# Patient Record
Sex: FEMALE | Race: WHITE | Employment: OTHER | URBAN - METROPOLITAN AREA
[De-identification: names, ages, dates, MRNs, and addresses within clinical notes are randomized per-mention and may not be internally consistent; named-entity substitution may affect disease eponyms.]

---

## 2017-01-20 ENCOUNTER — TELEPHONE (OUTPATIENT)
Dept: HEMATOLOGY/ONCOLOGY | Facility: HOSPITAL | Age: 67
End: 2017-01-20

## 2017-01-20 NOTE — TELEPHONE ENCOUNTER
Phoned patient to inform her that she does not need to follow up with the cardiologist any further since these visits are not covered by her insurance. Patient was instructed to follow up with Dr. Peter Couch as indicated.  Patient instructed to call with any fu

## 2017-03-02 ENCOUNTER — TELEPHONE (OUTPATIENT)
Dept: SURGERY | Facility: CLINIC | Age: 67
End: 2017-03-02

## 2017-04-17 ENCOUNTER — TELEPHONE (OUTPATIENT)
Dept: HEMATOLOGY/ONCOLOGY | Facility: HOSPITAL | Age: 67
End: 2017-04-17

## 2017-04-17 NOTE — TELEPHONE ENCOUNTER
Pt currently taking Exemestane daily. Calling to report increase joint pain. Has a trip to [place] on wed 4/19-4/26. Questioning if she can hold medication x 1 week to see if symptoms improve. Pt has MD f/u appt on 5/1.   Discussed above with Dr. Cristal Orellana- ok

## 2017-05-01 ENCOUNTER — OFFICE VISIT (OUTPATIENT)
Dept: HEMATOLOGY/ONCOLOGY | Age: 67
End: 2017-05-01
Attending: INTERNAL MEDICINE
Payer: COMMERCIAL

## 2017-05-01 VITALS
TEMPERATURE: 98 F | HEART RATE: 89 BPM | SYSTOLIC BLOOD PRESSURE: 128 MMHG | OXYGEN SATURATION: 99 % | BODY MASS INDEX: 36 KG/M2 | RESPIRATION RATE: 20 BRPM | WEIGHT: 201 LBS | DIASTOLIC BLOOD PRESSURE: 82 MMHG

## 2017-05-01 DIAGNOSIS — R23.2 HOT FLASHES RELATED TO AROMATASE INHIBITOR THERAPY: ICD-10-CM

## 2017-05-01 DIAGNOSIS — T45.1X5A HOT FLASHES RELATED TO AROMATASE INHIBITOR THERAPY: ICD-10-CM

## 2017-05-01 DIAGNOSIS — E55.9 VITAMIN D DEFICIENCY: ICD-10-CM

## 2017-05-01 DIAGNOSIS — C50.111 MALIGNANT NEOPLASM OF CENTRAL PORTION OF RIGHT FEMALE BREAST (HCC): ICD-10-CM

## 2017-05-01 DIAGNOSIS — T50.905D MEDICATION ADVERSE EFFECT, SUBSEQUENT ENCOUNTER: Primary | ICD-10-CM

## 2017-05-01 PROCEDURE — 99214 OFFICE O/P EST MOD 30 MIN: CPT | Performed by: INTERNAL MEDICINE

## 2017-05-01 RX ORDER — ANASTROZOLE 1 MG/1
1 TABLET ORAL DAILY
COMMUNITY
End: 2017-05-18 | Stop reason: ALTCHOICE

## 2017-05-01 RX ORDER — LETROZOLE 2.5 MG/1
2.5 TABLET, FILM COATED ORAL DAILY
Qty: 30 TABLET | Refills: 11 | Status: SHIPPED | OUTPATIENT
Start: 2017-05-01 | End: 2018-04-21

## 2017-05-01 NOTE — PROGRESS NOTES
Pt here for MD f/u visit for h/o breast ca. Restarted Anastrozole 1mg daily on Thurs, 4/20. States she has several concerns to discuss with Dr. Jessika Nugent.      Education Record    Learner:  Patient    Disease / Diagnosis:breast ca    Barriers / Limitations:  N

## 2017-05-01 NOTE — PATIENT INSTRUCTIONS
For Dr. Sirisha Barnett nurse line, call  [phone] with any questions or concerns Monday through Friday 8:00 to 4:30.   After hours or weekends for emergent needs [phone]

## 2017-05-01 NOTE — PROGRESS NOTES
[facility] Progress Note      Patient Name:  Queen Amor  YOB: 1950  Medical Record Number:  [id number]    Date of visit: 5/1/2017    CHIEF COMPLAINT: Stage I HER-2 positive right breast carcinoma status post bilateral mastecto easy bruising or bleeding     ALLERGIES:    Adhesive Tape               Comment:Nola  Bacitracin                Codeine                 Nausea and vomiting  Doxycycline             Rash  Levaquin                Rash  Levaquin [Levofloxa* mg by mouth daily. Disp:  Rfl:    Vitamin C (VITAMIN C) 500 MG Oral Tab Take 500 mg by mouth 2 (two) times daily. Disp:  Rfl:    exemestane 25 MG Oral Tab Take 1 tablet (25 mg total) by mouth daily.  Disp: 30 tablet Rfl: 11       VITALS:  Height: --  [employer] 4. 45 1.30-6.70 x10 (3) uL   Neutrophil Absolute 4.45 1.30-6.70 x10(3) uL   Lymphocyte Absolute 1.57 0.90-4.00 x10(3) uL   Monocyte Absolute 0.43 0.10-0.60 x10(3) uL   Eosinophil Absolute 0.15 0.00-0.30 x10(3) uL   Basophil Absolute 0.03 0.00-0.10 x10(3) uL

## 2017-05-18 ENCOUNTER — OFFICE VISIT (OUTPATIENT)
Dept: INTERNAL MEDICINE CLINIC | Facility: CLINIC | Age: 67
End: 2017-05-18

## 2017-05-18 VITALS
HEIGHT: 62.75 IN | OXYGEN SATURATION: 99 % | WEIGHT: 201 LBS | SYSTOLIC BLOOD PRESSURE: 110 MMHG | TEMPERATURE: 98 F | RESPIRATION RATE: 16 BRPM | BODY MASS INDEX: 36.06 KG/M2 | DIASTOLIC BLOOD PRESSURE: 70 MMHG | HEART RATE: 82 BPM

## 2017-05-18 DIAGNOSIS — E03.9 ACQUIRED HYPOTHYROIDISM: Primary | ICD-10-CM

## 2017-05-18 DIAGNOSIS — M72.2 PLANTAR FASCIITIS: ICD-10-CM

## 2017-05-18 DIAGNOSIS — Z00.00 ROUTINE GENERAL MEDICAL EXAMINATION AT A HEALTH CARE FACILITY: ICD-10-CM

## 2017-05-18 DIAGNOSIS — E55.9 VITAMIN D DEFICIENCY: ICD-10-CM

## 2017-05-18 PROCEDURE — 99214 OFFICE O/P EST MOD 30 MIN: CPT | Performed by: INTERNAL MEDICINE

## 2017-05-18 NOTE — PROGRESS NOTES
Jony Contreras is a 61year old female  Patient presents with:  Thyroid Problem  Injury: Right Leg - Bupmed into something with right thigh area in march, bump has almost gone away but now getting a warm - burning sensation feeling running down leg  Foot (CINNAMON PLUS CHROMIUM OR) Take 2 tablets by mouth daily. Disp:  Rfl:    Cranberry 500 MG Oral Cap Take 1 capsule by mouth daily. Disp:  Rfl:    Ginkgo Biloba (GINKOBA OR) Take 120 mg by mouth 2 (two) times daily.  Disp:  Rfl:    Lutein-Zeaxanthin 25-5 M PANEL (14)   Result Value Ref Range   Glucose 92 70-99 mg/dL   BUN 31 (H) 8-20 mg/dL   Creatinine 1.19 (H) 0.55-1.02 mg/dL   GFR 48 (L) >=60   Calcium, Total 9.8 8.3-10.3 mg/dL   Alkaline Phosphatase 62  U/L   AST 24 15-41 U/L   Alt 47 14-54 U/L   Bi in this encounter    Imaging & Consults:  None    Return in about 3 months (around 8/18/2017) for multiple issues. There are no Patient Instructions on file for this visit.        The patient indicates understanding of these issues and agrees to the plan

## 2017-05-31 ENCOUNTER — SNF/IP PROF CHARGE ONLY (OUTPATIENT)
Dept: HEMATOLOGY/ONCOLOGY | Facility: HOSPITAL | Age: 67
End: 2017-05-31

## 2017-05-31 DIAGNOSIS — Z17.0 MALIGNANT NEOPLASM OF CENTRAL PORTION OF RIGHT BREAST IN FEMALE, ESTROGEN RECEPTOR POSITIVE (HCC): ICD-10-CM

## 2017-05-31 DIAGNOSIS — C50.111 MALIGNANT NEOPLASM OF CENTRAL PORTION OF RIGHT BREAST IN FEMALE, ESTROGEN RECEPTOR POSITIVE (HCC): ICD-10-CM

## 2017-05-31 PROCEDURE — G9678 ONCOLOGY CARE MODEL SERVICE: HCPCS | Performed by: INTERNAL MEDICINE

## 2017-06-30 ENCOUNTER — SNF/IP PROF CHARGE ONLY (OUTPATIENT)
Dept: HEMATOLOGY/ONCOLOGY | Facility: HOSPITAL | Age: 67
End: 2017-06-30

## 2017-06-30 DIAGNOSIS — Z17.0 MALIGNANT NEOPLASM OF CENTRAL PORTION OF RIGHT BREAST IN FEMALE, ESTROGEN RECEPTOR POSITIVE (HCC): ICD-10-CM

## 2017-06-30 DIAGNOSIS — C50.111 MALIGNANT NEOPLASM OF CENTRAL PORTION OF RIGHT BREAST IN FEMALE, ESTROGEN RECEPTOR POSITIVE (HCC): ICD-10-CM

## 2017-06-30 PROCEDURE — G9678 ONCOLOGY CARE MODEL SERVICE: HCPCS | Performed by: INTERNAL MEDICINE

## 2017-07-31 ENCOUNTER — SNF/IP PROF CHARGE ONLY (OUTPATIENT)
Dept: HEMATOLOGY/ONCOLOGY | Facility: HOSPITAL | Age: 67
End: 2017-07-31

## 2017-07-31 DIAGNOSIS — C50.111 MALIGNANT NEOPLASM OF CENTRAL PORTION OF RIGHT BREAST IN FEMALE, ESTROGEN RECEPTOR POSITIVE (HCC): ICD-10-CM

## 2017-07-31 DIAGNOSIS — Z17.0 MALIGNANT NEOPLASM OF CENTRAL PORTION OF RIGHT BREAST IN FEMALE, ESTROGEN RECEPTOR POSITIVE (HCC): ICD-10-CM

## 2017-07-31 PROCEDURE — G9678 ONCOLOGY CARE MODEL SERVICE: HCPCS | Performed by: INTERNAL MEDICINE

## 2017-08-07 ENCOUNTER — APPOINTMENT (OUTPATIENT)
Dept: HEMATOLOGY/ONCOLOGY | Age: 67
End: 2017-08-07
Payer: MEDICARE

## 2017-08-07 ENCOUNTER — TELEPHONE (OUTPATIENT)
Dept: CARDIOLOGY CLINIC | Facility: HOSPITAL | Age: 67
End: 2017-08-07

## 2017-08-07 NOTE — PROGRESS NOTES
Received call from Nelly Reyna that she will be out of town and cannot come to her appointment in the [facility] with Dr. Eagle Jaramillo on 8/21/17. Pt will call back when she is back in town.  Notified patient that we are limited with when we can make appointments to

## 2017-08-21 ENCOUNTER — NURSE ONLY (OUTPATIENT)
Dept: HEMATOLOGY/ONCOLOGY | Age: 67
End: 2017-08-21
Attending: INTERNAL MEDICINE
Payer: MEDICARE

## 2017-08-21 ENCOUNTER — TELEPHONE (OUTPATIENT)
Dept: INTERNAL MEDICINE CLINIC | Facility: CLINIC | Age: 67
End: 2017-08-21

## 2017-08-21 ENCOUNTER — OFFICE VISIT (OUTPATIENT)
Dept: HEMATOLOGY/ONCOLOGY | Age: 67
End: 2017-08-21
Attending: INTERNAL MEDICINE
Payer: MEDICARE

## 2017-08-21 VITALS
WEIGHT: 206 LBS | DIASTOLIC BLOOD PRESSURE: 84 MMHG | OXYGEN SATURATION: 97 % | HEART RATE: 89 BPM | BODY MASS INDEX: 37 KG/M2 | TEMPERATURE: 99 F | SYSTOLIC BLOOD PRESSURE: 134 MMHG | RESPIRATION RATE: 18 BRPM

## 2017-08-21 DIAGNOSIS — C50.111 MALIGNANT NEOPLASM OF CENTRAL PORTION OF RIGHT FEMALE BREAST (HCC): Primary | ICD-10-CM

## 2017-08-21 DIAGNOSIS — E03.9 ACQUIRED HYPOTHYROIDISM: ICD-10-CM

## 2017-08-21 DIAGNOSIS — I10 ESSENTIAL HYPERTENSION, BENIGN: ICD-10-CM

## 2017-08-21 DIAGNOSIS — E03.9 ACQUIRED HYPOTHYROIDISM: Primary | ICD-10-CM

## 2017-08-21 DIAGNOSIS — M89.9 BONE DISORDER: ICD-10-CM

## 2017-08-21 DIAGNOSIS — C50.111 MALIGNANT NEOPLASM OF CENTRAL PORTION OF RIGHT FEMALE BREAST (HCC): ICD-10-CM

## 2017-08-21 DIAGNOSIS — E55.9 VITAMIN D DEFICIENCY: ICD-10-CM

## 2017-08-21 DIAGNOSIS — R53.83 OTHER FATIGUE: ICD-10-CM

## 2017-08-21 LAB
25-HYDROXYVITAMIN D (TOTAL): 36.8 NG/ML (ref 30–100)
ALBUMIN SERPL-MCNC: 3.9 G/DL (ref 3.5–4.8)
ALP LIVER SERPL-CCNC: 66 U/L (ref 50–130)
ALT SERPL-CCNC: 32 U/L (ref 14–54)
AST SERPL-CCNC: 16 U/L (ref 15–41)
BASOPHILS # BLD AUTO: 0.03 X10(3) UL (ref 0–0.1)
BASOPHILS NFR BLD AUTO: 0.4 %
BILIRUB SERPL-MCNC: 0.4 MG/DL (ref 0.1–2)
BUN BLD-MCNC: 30 MG/DL (ref 8–20)
CALCIUM BLD-MCNC: 9.3 MG/DL (ref 8.3–10.3)
CHLORIDE: 106 MMOL/L (ref 101–111)
CHOLEST SMN-MCNC: 206 MG/DL (ref ?–200)
CO2: 28 MMOL/L (ref 22–32)
CREAT BLD-MCNC: 1.07 MG/DL (ref 0.55–1.02)
EOSINOPHIL # BLD AUTO: 0.14 X10(3) UL (ref 0–0.3)
EOSINOPHIL NFR BLD AUTO: 2 %
ERYTHROCYTE [DISTWIDTH] IN BLOOD BY AUTOMATED COUNT: 13.7 % (ref 11.5–16)
FREE T4: 1 NG/DL (ref 0.9–1.8)
GLUCOSE BLD-MCNC: 100 MG/DL (ref 70–99)
HCT VFR BLD AUTO: 38.5 % (ref 34–50)
HDLC SERPL-MCNC: 59 MG/DL (ref 45–?)
HDLC SERPL: 3.49 {RATIO} (ref ?–4.44)
HGB BLD-MCNC: 12.8 G/DL (ref 12–16)
IMMATURE GRANULOCYTE COUNT: 0.02 X10(3) UL (ref 0–1)
IMMATURE GRANULOCYTE RATIO %: 0.3 %
LDLC SERPL CALC-MCNC: 122 MG/DL (ref ?–130)
LDLC SERPL-MCNC: 25 MG/DL (ref 5–40)
LYMPHOCYTES # BLD AUTO: 2.19 X10(3) UL (ref 0.9–4)
LYMPHOCYTES NFR BLD AUTO: 31.8 %
M PROTEIN MFR SERPL ELPH: 7.7 G/DL (ref 6.1–8.3)
MCH RBC QN AUTO: 30.8 PG (ref 27–33.2)
MCHC RBC AUTO-ENTMCNC: 33.2 G/DL (ref 31–37)
MCV RBC AUTO: 92.5 FL (ref 81–100)
MONOCYTES # BLD AUTO: 0.48 X10(3) UL (ref 0.1–0.6)
MONOCYTES NFR BLD AUTO: 7 %
NEUTROPHIL ABS PRELIM: 4.03 X10 (3) UL (ref 1.3–6.7)
NEUTROPHILS # BLD AUTO: 4.03 X10(3) UL (ref 1.3–6.7)
NEUTROPHILS NFR BLD AUTO: 58.5 %
NONHDLC SERPL-MCNC: 147 MG/DL (ref ?–130)
PLATELET # BLD AUTO: 235 10(3)UL (ref 150–450)
POTASSIUM SERPL-SCNC: 4 MMOL/L (ref 3.6–5.1)
RBC # BLD AUTO: 4.16 X10(6)UL (ref 3.8–5.1)
RED CELL DISTRIBUTION WIDTH-SD: 47 FL (ref 35.1–46.3)
SODIUM SERPL-SCNC: 139 MMOL/L (ref 136–144)
TRIGLYCERIDES: 123 MG/DL (ref ?–150)
TSI SER-ACNC: 8.63 MIU/ML (ref 0.35–5.5)
WBC # BLD AUTO: 6.9 X10(3) UL (ref 4–13)

## 2017-08-21 PROCEDURE — 80053 COMPREHEN METABOLIC PANEL: CPT

## 2017-08-21 PROCEDURE — 82306 VITAMIN D 25 HYDROXY: CPT

## 2017-08-21 PROCEDURE — 99214 OFFICE O/P EST MOD 30 MIN: CPT | Performed by: INTERNAL MEDICINE

## 2017-08-21 PROCEDURE — 85025 COMPLETE CBC W/AUTO DIFF WBC: CPT

## 2017-08-21 PROCEDURE — 36415 COLL VENOUS BLD VENIPUNCTURE: CPT

## 2017-08-21 NOTE — PROGRESS NOTES
[facility] Progress Note      Patient Name:  Lauren Newton  YOB: 1950  Medical Record Number:  [id number]    Date of visit: 8/21/2017    CHIEF COMPLAINT: Stage I HER-2 positive right breast carcinoma status post bilateral mastect confusion or depression   Heme: no easy bruising or bleeding     ALLERGIES:    Adhesive Tape               Comment:Nola  Bacitracin                Codeine                 Nausea and vomiting  Doxycycline             Rash  Levaquin Rfl:    Vitamin C (VITAMIN C) 500 MG Oral Tab Take 500 mg by mouth daily.    Disp:  Rfl:        VITALS:  Height: --  Weight: --  BSA (Calculated - sq m): --  Pulse: --  BP: --  Temp: --  Do Not Use - Resp Rate: --  SpO2: --    PS:  ECO    PHYSICAL EXAM: Lymphocyte Absolute 2.19 0.90 - 4.00 x10(3) uL   Monocyte Absolute 0.48 0.10 - 0.60 x10(3) uL   Eosinophil Absolute 0.14 0.00 - 0.30 x10(3) uL   Basophil Absolute 0.03 0.00 - 0.10 x10(3) uL   Immature Granulocyte Absolute 0.02 0.00 - 1.00 x10(3) uL   Chai

## 2017-08-21 NOTE — TELEPHONE ENCOUNTER
Received call from Pt at lab. Spoke with Jhoan Urbano, [employer], re:order for Lipids and TSH + FreeT4 has  .  asking of OK to reorder. They can run tests today on available blood. Genaro advise.

## 2017-08-21 NOTE — TELEPHONE ENCOUNTER
Per Dr. Amaro [facility], [facility] to reorder Lipids and TSH + FreeT4. Tests reordered. Left message on voice mail ( [phone]) of ., Jose Frazier, that tests were reordered to be done on existing specimen.

## 2017-08-22 ENCOUNTER — TELEPHONE (OUTPATIENT)
Dept: HEMATOLOGY/ONCOLOGY | Facility: HOSPITAL | Age: 67
End: 2017-08-22

## 2017-08-22 NOTE — TELEPHONE ENCOUNTER
Sheila Hernandez MD  [address]             Call, labs including vit d ok. Should get thy/chol results from PCP      Spoke to , verbalized understanding.

## 2017-08-24 ENCOUNTER — OFFICE VISIT (OUTPATIENT)
Dept: INTERNAL MEDICINE CLINIC | Facility: CLINIC | Age: 67
End: 2017-08-24

## 2017-08-24 VITALS
TEMPERATURE: 98 F | HEART RATE: 87 BPM | HEIGHT: 62.75 IN | DIASTOLIC BLOOD PRESSURE: 70 MMHG | BODY MASS INDEX: 36.64 KG/M2 | SYSTOLIC BLOOD PRESSURE: 102 MMHG | WEIGHT: 204.19 LBS | RESPIRATION RATE: 16 BRPM | OXYGEN SATURATION: 99 %

## 2017-08-24 DIAGNOSIS — E03.9 ACQUIRED HYPOTHYROIDISM: ICD-10-CM

## 2017-08-24 DIAGNOSIS — M76.62 TENDONITIS, ACHILLES, LEFT: Primary | ICD-10-CM

## 2017-08-24 DIAGNOSIS — M72.2 PLANTAR FASCIITIS OF LEFT FOOT: ICD-10-CM

## 2017-08-24 DIAGNOSIS — D22.9 NUMEROUS MOLES: ICD-10-CM

## 2017-08-24 PROCEDURE — 99214 OFFICE O/P EST MOD 30 MIN: CPT | Performed by: INTERNAL MEDICINE

## 2017-08-24 RX ORDER — CAL/D3/MAG11/ZINC/COP/MANG/BOR 600 MG-800
1 TABLET ORAL DAILY
COMMUNITY
End: 2017-09-20

## 2017-08-24 RX ORDER — BLACK COHOSH ROOT EXTRACT 80 MG
1000 CAPSULE ORAL DAILY
COMMUNITY
End: 2017-12-07 | Stop reason: DRUGHIGH

## 2017-08-24 RX ORDER — LEVOTHYROXINE SODIUM 0.03 MG/1
25 TABLET ORAL DAILY
Qty: 90 TABLET | Refills: 0 | Status: SHIPPED | OUTPATIENT
Start: 2017-08-24 | End: 2017-10-27

## 2017-08-24 NOTE — PROGRESS NOTES
Ariana Mills is a 74year old female. To F/U from last visit regarding subclinical hypothyroidism and foot pain  HPI:    Interim history:treating plantar fasciitis for most of the year conservatively.  It is still very painful and her achilles feels very HCl (VITAMIN B-6) 100 MG Oral Tab Take 100 mg by mouth daily. Disp:  Rfl:    aspirin 81 MG Oral Tab Take 81 mg by mouth daily. Disp:  Rfl:    vitamin E 400 UNITS Oral Cap Take 400 Units by mouth daily.    Disp:  Rfl:    Chromium-Cinnamon (CINNAMON PLUS  Cholesterol 59 >45 mg/dL   LDL Cholesterol 122 <130 mg/dL   VLDL 25 5 - 40 mg/dL   Chol/HDL Ratio 3.49 <4.44   Non HDL Chol 147 (H) <130 mg/dL   -TSH+FREE T4   Result Value Ref Range   Free T4 1.0 0.9 - 1.8 ng/dL   TSH 8.630 (H) 0.350 - 5.500 mIU/mL

## 2017-08-31 ENCOUNTER — SNF/IP PROF CHARGE ONLY (OUTPATIENT)
Dept: HEMATOLOGY/ONCOLOGY | Facility: HOSPITAL | Age: 67
End: 2017-08-31

## 2017-08-31 DIAGNOSIS — Z17.0 MALIGNANT NEOPLASM OF CENTRAL PORTION OF RIGHT BREAST IN FEMALE, ESTROGEN RECEPTOR POSITIVE (HCC): ICD-10-CM

## 2017-08-31 DIAGNOSIS — C50.111 MALIGNANT NEOPLASM OF CENTRAL PORTION OF RIGHT BREAST IN FEMALE, ESTROGEN RECEPTOR POSITIVE (HCC): ICD-10-CM

## 2017-08-31 PROCEDURE — G9678 ONCOLOGY CARE MODEL SERVICE: HCPCS | Performed by: INTERNAL MEDICINE

## 2017-09-06 ENCOUNTER — HOSPITAL ENCOUNTER (OUTPATIENT)
Dept: ULTRASOUND IMAGING | Age: 67
Discharge: HOME | End: 2017-09-06
Attending: SURGERY
Payer: MEDICARE

## 2017-09-06 DIAGNOSIS — R19.04 LEFT LOWER QUADRANT ABDOMINAL MASS: ICD-10-CM

## 2017-09-06 PROCEDURE — 76705 ECHO EXAM OF ABDOMEN: CPT | Performed by: SURGERY

## 2017-09-14 ENCOUNTER — HOSPITAL ENCOUNTER (OUTPATIENT)
Dept: ULTRASOUND IMAGING | Age: 67
Discharge: HOME | End: 2017-09-14
Attending: SURGERY
Payer: MEDICARE

## 2017-09-14 DIAGNOSIS — C50.111 CANCER OF CENTRAL PORTION OF RIGHT FEMALE BREAST (HCC): ICD-10-CM

## 2017-09-14 DIAGNOSIS — N63.0 BREAST MASS IN FEMALE: ICD-10-CM

## 2017-09-14 PROCEDURE — 76641 ULTRASOUND BREAST COMPLETE: CPT | Performed by: SURGERY

## 2017-09-15 ENCOUNTER — OFFICE VISIT (OUTPATIENT)
Dept: PHYSICAL THERAPY | Age: 67
End: 2017-09-15
Attending: ORTHOPAEDIC SURGERY
Payer: MEDICARE

## 2017-09-15 ENCOUNTER — APPOINTMENT (OUTPATIENT)
Dept: PHYSICAL THERAPY | Age: 67
End: 2017-09-15
Attending: ORTHOPAEDIC SURGERY
Payer: MEDICARE

## 2017-09-15 DIAGNOSIS — M67.88 ACHILLES TENDONOSIS OF LEFT LOWER EXTREMITY: ICD-10-CM

## 2017-09-15 DIAGNOSIS — M72.2 PLANTAR FASCIITIS OF LEFT FOOT: ICD-10-CM

## 2017-09-15 DIAGNOSIS — M21.862 GASTROCNEMIUS EQUINUS OF LEFT LOWER EXTREMITY: ICD-10-CM

## 2017-09-15 DIAGNOSIS — M25.572 LEFT ANKLE PAIN, UNSPECIFIED CHRONICITY: ICD-10-CM

## 2017-09-15 PROCEDURE — 97110 THERAPEUTIC EXERCISES: CPT

## 2017-09-15 PROCEDURE — 97162 PT EVAL MOD COMPLEX 30 MIN: CPT

## 2017-09-15 NOTE — PROGRESS NOTES
LOWER EXTREMITY EVALUATION:   Referring Physician: Dr. Paola Hanson  Diagnosis: Associated DX:  Left ankle pain, unspecified chronicity (M25.572)  Plantar fasciitis of left foot (M72.2)  Achilles tendonosis of left lower extremity (M76.62)  Gastrocnemius equi post tib tendon. Manjinder Side would benefit from skilled Physical Therapy to address the above impairments to help return to prior level of functioning and address goals below.       Precautions:  Cancer, adhesive tape   OBJECTIVE:   Observation: bilat foot pr hips/back and PMHx .       PLAN OF CARE:    Goals:  8 visits  (I) with HEP  Increased ankle DF to 10 deg for more normal gait quality/foot clearance  Increased SLS balance to 5 for improved balance and gait  Able to tolerate stand/walking for 60 mins with d

## 2017-09-18 ENCOUNTER — OFFICE VISIT (OUTPATIENT)
Dept: PHYSICAL THERAPY | Age: 67
End: 2017-09-18
Attending: ORTHOPAEDIC SURGERY
Payer: MEDICARE

## 2017-09-18 ENCOUNTER — APPOINTMENT (OUTPATIENT)
Dept: PHYSICAL THERAPY | Age: 67
End: 2017-09-18
Attending: ORTHOPAEDIC SURGERY
Payer: MEDICARE

## 2017-09-18 DIAGNOSIS — M72.2 PLANTAR FASCIITIS OF LEFT FOOT: ICD-10-CM

## 2017-09-18 DIAGNOSIS — M21.862 GASTROCNEMIUS EQUINUS OF LEFT LOWER EXTREMITY: ICD-10-CM

## 2017-09-18 DIAGNOSIS — M67.88 ACHILLES TENDONOSIS OF LEFT LOWER EXTREMITY: ICD-10-CM

## 2017-09-18 DIAGNOSIS — M25.572 LEFT ANKLE PAIN, UNSPECIFIED CHRONICITY: ICD-10-CM

## 2017-09-18 PROCEDURE — 97035 APP MDLTY 1+ULTRASOUND EA 15: CPT

## 2017-09-18 PROCEDURE — 97140 MANUAL THERAPY 1/> REGIONS: CPT

## 2017-09-18 PROCEDURE — 97112 NEUROMUSCULAR REEDUCATION: CPT

## 2017-09-18 PROCEDURE — 97110 THERAPEUTIC EXERCISES: CPT

## 2017-09-18 NOTE — PROGRESS NOTES
Dx: Associated DX:  Left ankle pain, unspecified chronicity (M25.572)  Plantar fasciitis of left foot (M72.2)  Achilles tendonosis of left lower extremity (M76.62)  Gastrocnemius equinus of left lower extremity (X75.4U5)           Authorized # of Visits: Skilled Services: pt education including gait quality, exercise modifications, manual work, exercise instruction and cueing for form.      Charges: Radha 1( 20 min) NR x 1 (13')  man x 1 ( 15 min)  U/S Total Timed Treatment: 60min     Total Treatment Ti

## 2017-09-20 ENCOUNTER — OFFICE VISIT (OUTPATIENT)
Dept: PHYSICAL THERAPY | Age: 67
End: 2017-09-20
Attending: ORTHOPAEDIC SURGERY
Payer: MEDICARE

## 2017-09-20 ENCOUNTER — OFFICE VISIT (OUTPATIENT)
Dept: SURGERY | Facility: CLINIC | Age: 67
End: 2017-09-20

## 2017-09-20 VITALS — HEIGHT: 62.75 IN | TEMPERATURE: 98 F | WEIGHT: 198 LBS | BODY MASS INDEX: 35.52 KG/M2

## 2017-09-20 DIAGNOSIS — Z17.0 MALIGNANT NEOPLASM OF CENTRAL PORTION OF RIGHT BREAST IN FEMALE, ESTROGEN RECEPTOR POSITIVE (HCC): Primary | ICD-10-CM

## 2017-09-20 DIAGNOSIS — C50.111 MALIGNANT NEOPLASM OF CENTRAL PORTION OF RIGHT BREAST IN FEMALE, ESTROGEN RECEPTOR POSITIVE (HCC): Primary | ICD-10-CM

## 2017-09-20 DIAGNOSIS — M67.88 ACHILLES TENDONOSIS OF LEFT LOWER EXTREMITY: ICD-10-CM

## 2017-09-20 DIAGNOSIS — M25.572 LEFT ANKLE PAIN, UNSPECIFIED CHRONICITY: ICD-10-CM

## 2017-09-20 DIAGNOSIS — M72.2 PLANTAR FASCIITIS OF LEFT FOOT: ICD-10-CM

## 2017-09-20 DIAGNOSIS — M21.862 GASTROCNEMIUS EQUINUS OF LEFT LOWER EXTREMITY: ICD-10-CM

## 2017-09-20 PROCEDURE — 97110 THERAPEUTIC EXERCISES: CPT

## 2017-09-20 PROCEDURE — 97035 APP MDLTY 1+ULTRASOUND EA 15: CPT

## 2017-09-20 PROCEDURE — 99213 OFFICE O/P EST LOW 20 MIN: CPT | Performed by: SURGERY

## 2017-09-20 PROCEDURE — 97140 MANUAL THERAPY 1/> REGIONS: CPT

## 2017-09-20 NOTE — PROGRESS NOTES
Dx: Associated DX:  Left ankle pain, unspecified chronicity (M25.572)  Plantar fasciitis of left foot (M72.2)  Achilles tendonosis of left lower extremity (M76.62)  Gastrocnemius equinus of left lower extremity (X76.0K3)           Authorized # of Visits: Achilles/ distal gastroc        HEP review as above. STM/IASTM Achilles/gastroc, plantar fasciitis.          U/S 1.5 wts/cm2 8'  Achilles/ distal gastroc         STM Achilles/  Gastroc, plantar fascia         desensitization, forefoot mobes inv/ever grade

## 2017-09-20 NOTE — PROGRESS NOTES
Follow Up Visit Note       Active Problems      1. Malignant neoplasm of central portion of right breast in female, estrogen receptor positive [facility])          Chief Complaint   Patient presents with:  Breast Problem:  Follow up after bilateral breast Larene Finders 7/13/15   • PONV (postoperative nausea and vomiting)     nausea   • Rosacea    • Spinal stenosis of lumbar region at multiple levels 8/1/13   • Subclinical hypothyroidism 12/9/2016   • UTI (urinary tract infection)    • Venous insufficiency    • Visual imp (OMEGA 3-6-9 COMPLEX) Oral Cap Take 1,000 mg by mouth daily. Disp:  Rfl:    Levothyroxine Sodium 25 MCG Oral Tab Take 1 tablet (25 mcg total) by mouth daily. Disp: 90 tablet Rfl: 0   Ciclopirox 8 % External Solution Apply 1 Application topically nightly. Systems   Constitutional: Negative for chills, diaphoresis, fatigue, fever and unexpected weight change. HENT: Negative for hearing loss, nosebleeds, sore throat and trouble swallowing.     Respiratory: Negative for apnea, cough, shortness of breath and w aspect of her right breast that feels slightly firmer. The site of increased left breast tenderness is marked. Abdominal: Soft. She exhibits no distension. Musculoskeletal: Normal range of motion.    Lymphadenopathy:        Head (right side): No submen Left breast: Similar to the right breast, there are multiple a vascular well-defined hypoechoic masses with some cystic degeneration. These are all stable or smaller and the larger lesions have undergone cystic degeneration consistent with fat necrosis.

## 2017-09-21 ENCOUNTER — APPOINTMENT (OUTPATIENT)
Dept: PHYSICAL THERAPY | Age: 67
End: 2017-09-21
Attending: ORTHOPAEDIC SURGERY
Payer: MEDICARE

## 2017-09-25 ENCOUNTER — APPOINTMENT (OUTPATIENT)
Dept: PHYSICAL THERAPY | Age: 67
End: 2017-09-25
Attending: ORTHOPAEDIC SURGERY
Payer: MEDICARE

## 2017-09-25 ENCOUNTER — OFFICE VISIT (OUTPATIENT)
Dept: PHYSICAL THERAPY | Age: 67
End: 2017-09-25
Attending: ORTHOPAEDIC SURGERY
Payer: MEDICARE

## 2017-09-25 DIAGNOSIS — M72.2 PLANTAR FASCIITIS OF LEFT FOOT: ICD-10-CM

## 2017-09-25 DIAGNOSIS — M25.572 LEFT ANKLE PAIN, UNSPECIFIED CHRONICITY: ICD-10-CM

## 2017-09-25 DIAGNOSIS — M21.862 GASTROCNEMIUS EQUINUS OF LEFT LOWER EXTREMITY: ICD-10-CM

## 2017-09-25 DIAGNOSIS — M67.88 ACHILLES TENDONOSIS OF LEFT LOWER EXTREMITY: ICD-10-CM

## 2017-09-25 PROCEDURE — 97140 MANUAL THERAPY 1/> REGIONS: CPT

## 2017-09-25 PROCEDURE — 97110 THERAPEUTIC EXERCISES: CPT

## 2017-09-25 PROCEDURE — 97035 APP MDLTY 1+ULTRASOUND EA 15: CPT

## 2017-09-25 NOTE — PROGRESS NOTES
Dx: Associated DX:  Left ankle pain, unspecified chronicity (M25.572)  Plantar fasciitis of left foot (M72.2)  Achilles tendonosis of left lower extremity (M76.62)  Gastrocnemius equinus of left lower extremity (J97.9Y9)           Authorized # of Visits: Edmar  AP and ML  x20 with UE support Calf stretch off step 20\" holds U/S 1.5 wts/cm2 8'  Achilles/ distal gastroc       Calf stretch off step 20\" holds U/S 1.5 wts/cm2 8'  Achilles/ distal gastroc STM/IASTM Achilles/gastroc, plantar fasciitis.

## 2017-09-27 ENCOUNTER — APPOINTMENT (OUTPATIENT)
Dept: PHYSICAL THERAPY | Age: 67
End: 2017-09-27
Attending: ORTHOPAEDIC SURGERY
Payer: MEDICARE

## 2017-09-27 ENCOUNTER — OFFICE VISIT (OUTPATIENT)
Dept: PHYSICAL THERAPY | Age: 67
End: 2017-09-27
Attending: ORTHOPAEDIC SURGERY
Payer: MEDICARE

## 2017-09-27 DIAGNOSIS — M25.572 LEFT ANKLE PAIN, UNSPECIFIED CHRONICITY: ICD-10-CM

## 2017-09-27 DIAGNOSIS — M67.88 ACHILLES TENDONOSIS OF LEFT LOWER EXTREMITY: ICD-10-CM

## 2017-09-27 DIAGNOSIS — M21.862 GASTROCNEMIUS EQUINUS OF LEFT LOWER EXTREMITY: ICD-10-CM

## 2017-09-27 DIAGNOSIS — M72.2 PLANTAR FASCIITIS OF LEFT FOOT: ICD-10-CM

## 2017-09-27 PROCEDURE — 97035 APP MDLTY 1+ULTRASOUND EA 15: CPT

## 2017-09-27 PROCEDURE — 97530 THERAPEUTIC ACTIVITIES: CPT

## 2017-09-27 PROCEDURE — 97110 THERAPEUTIC EXERCISES: CPT

## 2017-09-27 NOTE — PROGRESS NOTES
Dx: Associated DX:  Left ankle pain, unspecified chronicity (M25.572)  Plantar fasciitis of left foot (M72.2)  Achilles tendonosis of left lower extremity (M76.62)  Gastrocnemius equinus of left lower extremity (C90.4I7)           Authorized # of Visits: ML  x20 with UE support Calf stretch off step 20\" holds U/S 1.5 wts/cm2 8'  Achilles/ distal gastroc 6\" FSU 10x and over      Calf stretch off step 20\" holds U/S 1.5 wts/cm2 8'  Achilles/ distal gastroc STM/IASTM Achilles/gastroc, plantar fasciitis.   Up

## 2017-09-30 ENCOUNTER — SNF/IP PROF CHARGE ONLY (OUTPATIENT)
Dept: HEMATOLOGY/ONCOLOGY | Facility: HOSPITAL | Age: 67
End: 2017-09-30

## 2017-09-30 DIAGNOSIS — Z17.0 MALIGNANT NEOPLASM OF CENTRAL PORTION OF RIGHT BREAST IN FEMALE, ESTROGEN RECEPTOR POSITIVE (HCC): ICD-10-CM

## 2017-09-30 DIAGNOSIS — C50.111 MALIGNANT NEOPLASM OF CENTRAL PORTION OF RIGHT BREAST IN FEMALE, ESTROGEN RECEPTOR POSITIVE (HCC): ICD-10-CM

## 2017-09-30 PROCEDURE — G9678 ONCOLOGY CARE MODEL SERVICE: HCPCS | Performed by: INTERNAL MEDICINE

## 2017-10-24 ENCOUNTER — OFFICE VISIT (OUTPATIENT)
Dept: PHYSICAL THERAPY | Age: 67
End: 2017-10-24
Attending: ORTHOPAEDIC SURGERY
Payer: MEDICARE

## 2017-10-24 ENCOUNTER — APPOINTMENT (OUTPATIENT)
Dept: PHYSICAL THERAPY | Age: 67
End: 2017-10-24
Attending: ORTHOPAEDIC SURGERY
Payer: MEDICARE

## 2017-10-24 DIAGNOSIS — M25.572 LEFT ANKLE PAIN, UNSPECIFIED CHRONICITY: ICD-10-CM

## 2017-10-24 DIAGNOSIS — M72.2 PLANTAR FASCIITIS OF LEFT FOOT: ICD-10-CM

## 2017-10-24 DIAGNOSIS — M21.862 GASTROCNEMIUS EQUINUS OF LEFT LOWER EXTREMITY: ICD-10-CM

## 2017-10-24 DIAGNOSIS — M67.88 ACHILLES TENDONOSIS OF LEFT LOWER EXTREMITY: ICD-10-CM

## 2017-10-24 PROCEDURE — 97112 NEUROMUSCULAR REEDUCATION: CPT

## 2017-10-24 PROCEDURE — 97140 MANUAL THERAPY 1/> REGIONS: CPT

## 2017-10-24 PROCEDURE — 97110 THERAPEUTIC EXERCISES: CPT

## 2017-10-24 NOTE — PROGRESS NOTES
Dx: Associated DX:  Left ankle pain, unspecified chronicity (M25.572)  Plantar fasciitis of left foot (M72.2)  Achilles tendonosis of left lower extremity (M76.62)  Gastrocnemius equinus of left lower extremity (I36.0H5)           Authorized # of Visits: stance 5' Rocker  AP and ML  x20 with UE support Rocker  AP and ML  x20 with UE support Rocker  AP and ML  x20 with UE support Balance challenges  Half tandem L and R  -added head turns x10     DL heel/toe raises 10x Gait train 2 laps Calf stretch off step

## 2017-10-26 ENCOUNTER — OFFICE VISIT (OUTPATIENT)
Dept: PHYSICAL THERAPY | Age: 67
End: 2017-10-26
Attending: ORTHOPAEDIC SURGERY
Payer: MEDICARE

## 2017-10-26 ENCOUNTER — APPOINTMENT (OUTPATIENT)
Dept: PHYSICAL THERAPY | Age: 67
End: 2017-10-26
Attending: ORTHOPAEDIC SURGERY
Payer: MEDICARE

## 2017-10-26 DIAGNOSIS — M72.2 PLANTAR FASCIITIS OF LEFT FOOT: ICD-10-CM

## 2017-10-26 DIAGNOSIS — M21.862 GASTROCNEMIUS EQUINUS OF LEFT LOWER EXTREMITY: ICD-10-CM

## 2017-10-26 DIAGNOSIS — M67.88 ACHILLES TENDONOSIS OF LEFT LOWER EXTREMITY: ICD-10-CM

## 2017-10-26 DIAGNOSIS — M25.572 LEFT ANKLE PAIN, UNSPECIFIED CHRONICITY: ICD-10-CM

## 2017-10-26 PROCEDURE — 97110 THERAPEUTIC EXERCISES: CPT

## 2017-10-26 PROCEDURE — 97112 NEUROMUSCULAR REEDUCATION: CPT

## 2017-10-26 NOTE — PROGRESS NOTES
Dx: Associated DX:  Left ankle pain, unspecified chronicity (M25.572)  Plantar fasciitis of left foot (M72.2)  Achilles tendonosis of left lower extremity (M76.62)  Gastrocnemius equinus of left lower extremity (O67.0B5)           Authorized # of Visits: Rocker  AP and ML  x20 with UE support Rocker  AP and ML  x20 with UE support Balance challenges  Half tandem L and R  -added head turns x10 Rocker board  AP   MLwith int UE support    DL heel/toe raises 10x Gait train 2 laps Calf stretch off step 20\" hol

## 2017-10-27 DIAGNOSIS — M76.62 TENDONITIS, ACHILLES, LEFT: ICD-10-CM

## 2017-10-27 DIAGNOSIS — M72.2 PLANTAR FASCIITIS OF LEFT FOOT: ICD-10-CM

## 2017-10-27 RX ORDER — LEVOTHYROXINE SODIUM 0.03 MG/1
TABLET ORAL
Qty: 90 TABLET | Refills: 0 | Status: SHIPPED | OUTPATIENT
Start: 2017-10-27 | End: 2017-12-07 | Stop reason: DRUGHIGH

## 2017-10-27 RX ORDER — LISINOPRIL 10 MG/1
TABLET ORAL
Qty: 90 TABLET | Refills: 0 | Status: SHIPPED | OUTPATIENT
Start: 2017-10-27 | End: 2018-01-17

## 2017-10-27 RX ORDER — POTASSIUM CHLORIDE 20 MEQ/1
TABLET, EXTENDED RELEASE ORAL
Qty: 45 TABLET | Refills: 0 | Status: SHIPPED | OUTPATIENT
Start: 2017-10-27 | End: 2018-01-17

## 2017-10-27 NOTE — TELEPHONE ENCOUNTER
Last OV relevant to medication: 8/24/17  Last refill date: 8/24/17     #/refills: 90/0  When pt was asked to return for OV: 3 months  Upcoming appt/reason: none, due 11/24/17.   left message to call back for OV    Lab Results  Component Value Date

## 2017-10-30 ENCOUNTER — OFFICE VISIT (OUTPATIENT)
Dept: PHYSICAL THERAPY | Age: 67
End: 2017-10-30
Attending: ORTHOPAEDIC SURGERY
Payer: MEDICARE

## 2017-10-30 PROCEDURE — 97112 NEUROMUSCULAR REEDUCATION: CPT

## 2017-10-30 PROCEDURE — 97110 THERAPEUTIC EXERCISES: CPT

## 2017-10-30 NOTE — PROGRESS NOTES
Dx: Associated DX:  Left ankle pain, unspecified chronicity (M25.572)  Plantar fasciitis of left foot (M72.2)  Achilles tendonosis of left lower extremity (M76.62)  Gastrocnemius equinus of left lower extremity (L85.6K9)           Authorized # of Visits: with MD prn. Projected G Code: Mobility: Walking and Moving Around CJ: 20-39% impaired, limited, or restricted  Discharge G Code:  Mobility: Walking and Moving Around CJ: 20-39% impaired, limited, or restricted      Patient/Family/Caregiver was advise gastroc x 5' Pelvic tilt/ab recruitment training.   Calf stretch off step  x3 Reassess/review of HEP for D/C    U/S 1.5 wts/cm2 8'  Achilles/ distal gastroc   Lunge soleus stretch Review of HEP, self massage plantar fascia/ desensitization mobilization inve

## 2017-10-31 ENCOUNTER — SNF/IP PROF CHARGE ONLY (OUTPATIENT)
Dept: HEMATOLOGY/ONCOLOGY | Facility: HOSPITAL | Age: 67
End: 2017-10-31

## 2017-10-31 DIAGNOSIS — C50.111 MALIGNANT NEOPLASM OF CENTRAL PORTION OF RIGHT BREAST IN FEMALE, ESTROGEN RECEPTOR POSITIVE (HCC): ICD-10-CM

## 2017-10-31 DIAGNOSIS — Z17.0 MALIGNANT NEOPLASM OF CENTRAL PORTION OF RIGHT BREAST IN FEMALE, ESTROGEN RECEPTOR POSITIVE (HCC): ICD-10-CM

## 2017-10-31 PROCEDURE — G9678 ONCOLOGY CARE MODEL SERVICE: HCPCS | Performed by: INTERNAL MEDICINE

## 2017-11-21 ENCOUNTER — OFFICE VISIT (OUTPATIENT)
Dept: INTERNAL MEDICINE CLINIC | Facility: CLINIC | Age: 67
End: 2017-11-21

## 2017-11-21 VITALS
BODY MASS INDEX: 35.91 KG/M2 | HEIGHT: 62.25 IN | OXYGEN SATURATION: 99 % | RESPIRATION RATE: 18 BRPM | SYSTOLIC BLOOD PRESSURE: 110 MMHG | DIASTOLIC BLOOD PRESSURE: 70 MMHG | WEIGHT: 197.63 LBS | TEMPERATURE: 97 F | HEART RATE: 63 BPM

## 2017-11-21 DIAGNOSIS — E03.9 ACQUIRED HYPOTHYROIDISM: ICD-10-CM

## 2017-11-21 DIAGNOSIS — Z12.31 ENCOUNTER FOR SCREENING MAMMOGRAM FOR BREAST CANCER: ICD-10-CM

## 2017-11-21 DIAGNOSIS — I10 ESSENTIAL HYPERTENSION: ICD-10-CM

## 2017-11-21 DIAGNOSIS — Z00.00 ENCOUNTER FOR MEDICARE ANNUAL WELLNESS EXAM: ICD-10-CM

## 2017-11-21 DIAGNOSIS — I87.2 VENOUS INSUFFICIENCY: ICD-10-CM

## 2017-11-21 DIAGNOSIS — Z12.11 COLON CANCER SCREENING: Primary | ICD-10-CM

## 2017-11-21 DIAGNOSIS — Z00.00 ENCOUNTER FOR ANNUAL HEALTH EXAMINATION: ICD-10-CM

## 2017-11-21 DIAGNOSIS — Z23 NEED FOR VACCINATION: ICD-10-CM

## 2017-11-21 PROCEDURE — G0402 INITIAL PREVENTIVE EXAM: HCPCS | Performed by: INTERNAL MEDICINE

## 2017-11-21 PROCEDURE — G0009 ADMIN PNEUMOCOCCAL VACCINE: HCPCS | Performed by: INTERNAL MEDICINE

## 2017-11-21 PROCEDURE — 90670 PCV13 VACCINE IM: CPT | Performed by: INTERNAL MEDICINE

## 2017-11-21 PROCEDURE — 90653 IIV ADJUVANT VACCINE IM: CPT | Performed by: INTERNAL MEDICINE

## 2017-11-21 PROCEDURE — 99213 OFFICE O/P EST LOW 20 MIN: CPT | Performed by: INTERNAL MEDICINE

## 2017-11-21 PROCEDURE — G0008 ADMIN INFLUENZA VIRUS VAC: HCPCS | Performed by: INTERNAL MEDICINE

## 2017-11-21 NOTE — PROGRESS NOTES
HPI:   Ginna Juarez is a 74year old female who presents for a Medicare Initial Preventative Physical Exam (Welcome to Medicare- < 12 months on Medicare).     Also f/u for hypothyroidism, recently started on T4 supplementation, very low dose due to bad CREATSERUM 1.07 (H) 08/21/2017    (H) 08/21/2017        CBC  (most recent labs)     Lab Results  Component Value Date   WBC 6.9 08/21/2017   HGB 12.8 08/21/2017   .0 08/21/2017        ALLERGIES:   She is allergic to adhesive tape; bacitraci 500 mg by mouth daily. MEDICAL INFORMATION:   She  has a past medical history of Acquired hypothyroidism (5/18/2017); Breast cancer ([facility] [address] (12/4/2014); Cancer ([facility] Colon polyp, hyperplastic; Colon polyp, hyperplastic; Essential hypertension;  High b SpO2 99%   BMI 35.85 kg/m²  Estimated body mass index is 35.85 kg/m² as calculated from the following:    Height as of this encounter: 62.25\". Weight as of this encounter: 197 lb 9.6 oz.     Medicare Hearing Assessment  (Required for AWV/SWV)    Finger list.     Diet assessment: excellent     Advanced Directive:  Living Will on file in Epic? Olivia Dick does not have a Living Will on file in [address] [place] [address] Rd.  Discussed with patient and provided information       Healthcare Power of [place] on file in [facility]: 0-No    Do you accidently lose urine?: (P) 0-No    Do you have difficulty seeing?: (P) 0-No    Do you have any difficulty walking or getting up?: (P) 0-No    Do you have any tripping hazards?: (P) 0-No    Are you on multiple medications?: (P) 1-Yes    Does results found for: FOB No flowsheet data found. Glaucoma Screening      Ophthalmology Visit Annually: Diabetics, FHx Glaucoma, AA>50, > 65 No flowsheet data found.     Bone Density Screening      Dexascan Every two years Last Dexa Scan:   XR DEXA External Lab or Procedure   Annual Monitoring of Persistent     Medications (ACE/ARB, digoxin diuretics, anticonvulsants.)    Potassium  Annually Potassium (mmol/L)   Date Value   08/21/2017 4.0     POTASSIUM (mmol/L)   Date Value   11/04/2013 4.1   09/25/

## 2017-11-21 NOTE — PATIENT INSTRUCTIONS
Vianney Daniel's SCREENING SCHEDULE   Tests on this list are recommended by your physician but may not be covered, or covered at this frequency, by your insurer. Please check with your insurance carrier before scheduling to verify coverage.    PREVENTATI cigarettes in their lifetime   • Anyone with a family history    Colorectal Cancer Screening  Covered up to Age 76     Colonoscopy Screen   Covered every 10 years- more often if abnormal Colonoscopy,10 Years due on 09/24/2017     ORDERED TODAY Update [facility] Mammogram regularly   Immunizations      Influenza  Covered Annually   Orders placed or performed in visit on 12/09/16  -FLU VAC NO PRSV 4 ANNETTA 3 YRS+   Orders placed or performed in visit on 12/11/15  -INFLUENZA VIRUS VACCINE, PRESERV FREE, >=3 YEARS OF AG definitions of the different types of Advance Directives. It also has the State forms available on it's website for anyone to review and print using their home computer and printer. (the forms are also available in [place]  www. putitinwriting. org  This li

## 2017-11-29 ENCOUNTER — TELEPHONE (OUTPATIENT)
Dept: INTERNAL MEDICINE CLINIC | Facility: CLINIC | Age: 67
End: 2017-11-29

## 2017-11-29 NOTE — TELEPHONE ENCOUNTER
Noted below. Mammogram cancelled. Left message on pt's voice mail advising as below. Message routed to Dr. Kalen Sinclair for review.

## 2017-11-29 NOTE — TELEPHONE ENCOUNTER
1. Pt has had double mastectomy, there is a mammogram order, cancel? Pt states she does not do those anymore. 2. TSH on file from 08/2017 - pt wanted to be sure that was only lab Dr Kym Dawson wanted her to have. Last OV only mentioned TSH.  Do any August lab

## 2017-11-29 NOTE — TELEPHONE ENCOUNTER
As far as I can tell no other labs needed per dr. Alea Jones notes. Okay to cancel mammogram.   Please send back to dr. Jessie Sharif for review, will hold until she gets back.

## 2017-12-04 ENCOUNTER — TELEPHONE (OUTPATIENT)
Dept: INTERNAL MEDICINE CLINIC | Facility: CLINIC | Age: 67
End: 2017-12-04

## 2017-12-04 NOTE — TELEPHONE ENCOUNTER
Pt called. States that she is having labs and Dexa scan tomorrow. Pt states that knows Thyroid test are ordered. Pt asking if any other labs need to be repeated. Last CMP and CBC done 8/21/17. Please advise.  Does pt need any other labs that TSH an

## 2017-12-05 ENCOUNTER — APPOINTMENT (OUTPATIENT)
Dept: LAB | Age: 67
End: 2017-12-05
Attending: INTERNAL MEDICINE
Payer: MEDICARE

## 2017-12-05 ENCOUNTER — HOSPITAL ENCOUNTER (OUTPATIENT)
Dept: BONE DENSITY | Age: 67
Discharge: HOME | End: 2017-12-05
Attending: INTERNAL MEDICINE
Payer: MEDICARE

## 2017-12-05 DIAGNOSIS — M72.2 PLANTAR FASCIITIS OF LEFT FOOT: ICD-10-CM

## 2017-12-05 DIAGNOSIS — M76.62 TENDONITIS, ACHILLES, LEFT: ICD-10-CM

## 2017-12-05 DIAGNOSIS — M89.9 BONE DISORDER: ICD-10-CM

## 2017-12-05 PROCEDURE — 84439 ASSAY OF FREE THYROXINE: CPT

## 2017-12-05 PROCEDURE — 36415 COLL VENOUS BLD VENIPUNCTURE: CPT

## 2017-12-05 PROCEDURE — 77080 DXA BONE DENSITY AXIAL: CPT | Performed by: INTERNAL MEDICINE

## 2017-12-05 PROCEDURE — 84443 ASSAY THYROID STIM HORMONE: CPT

## 2017-12-06 ENCOUNTER — TELEPHONE (OUTPATIENT)
Dept: HEMATOLOGY/ONCOLOGY | Facility: HOSPITAL | Age: 67
End: 2017-12-06

## 2017-12-06 NOTE — TELEPHONE ENCOUNTER
Lore Haynes MD  [address] [facility] [address]             Call, bone density just bit worse, no change in meds, continue leobardo/vit d/exercise      Spoke to patient, verbalized understanding.  Patient states she joined a gym 3 weeks ago and has started regular ex

## 2017-12-12 ENCOUNTER — TELEPHONE (OUTPATIENT)
Dept: INTERNAL MEDICINE CLINIC | Facility: CLINIC | Age: 67
End: 2017-12-12

## 2017-12-12 NOTE — TELEPHONE ENCOUNTER
Spoke with pt, advised of Dr Jacky Del Rio message below. Pt agreeable to slowly increasing dose, will recheck thyroid after being on 50 mcg daily for 3 months.

## 2017-12-12 NOTE — TELEPHONE ENCOUNTER
Levothyroxine increased to 50mcg on 12/7/17. Started med on Friday 12/8/17, same day started feeling dizzy, lightheaded, balance feels, stomach feels \"irritated\" - nausea, stomach upset, feels run down. /89. No heart palpitations.  Once she eats marcial

## 2017-12-12 NOTE — TELEPHONE ENCOUNTER
It is so unlikely this is due to T4 dose, does not act quickly  In the meantime, can alternate doses for a few weeks, if tolerating then increase slowly to daily 50 mcg.    When on 50 mcg daily for 3 mos, repeat labs

## 2018-01-17 RX ORDER — FUROSEMIDE 40 MG/1
TABLET ORAL
Qty: 45 TABLET | Refills: 0 | Status: SHIPPED | OUTPATIENT
Start: 2018-01-17 | End: 2018-04-20

## 2018-01-17 RX ORDER — LISINOPRIL 10 MG/1
TABLET ORAL
Qty: 90 TABLET | Refills: 0 | Status: SHIPPED | OUTPATIENT
Start: 2018-01-17 | End: 2018-04-21

## 2018-01-17 NOTE — TELEPHONE ENCOUNTER
Last OV relevant to medication: 11/21/17 welcome to medicare  Last refill date: 10/27/17     #/refills: 45/0  When pt was asked to return for OV: none noted  Upcoming appt/reason: none    Lab Results  Component Value Date    (H) 08/21/2017   BUN 30

## 2018-01-18 RX ORDER — POTASSIUM CHLORIDE 20 MEQ/1
TABLET, EXTENDED RELEASE ORAL
Qty: 45 TABLET | Refills: 0 | Status: SHIPPED | OUTPATIENT
Start: 2018-01-18 | End: 2018-04-20

## 2018-03-16 PROCEDURE — 88305 TISSUE EXAM BY PATHOLOGIST: CPT | Performed by: SPECIALIST

## 2018-03-21 PROBLEM — Z86.010 HISTORY OF ADENOMATOUS POLYP OF COLON: Status: ACTIVE | Noted: 2018-03-21

## 2018-03-21 PROBLEM — Z86.0101 HISTORY OF ADENOMATOUS POLYP OF COLON: Status: ACTIVE | Noted: 2018-03-21

## 2018-03-28 ENCOUNTER — APPOINTMENT (OUTPATIENT)
Dept: LAB | Age: 68
End: 2018-03-28
Attending: INTERNAL MEDICINE
Payer: MEDICARE

## 2018-03-28 DIAGNOSIS — E03.9 ACQUIRED HYPOTHYROIDISM: ICD-10-CM

## 2018-03-28 PROCEDURE — 84439 ASSAY OF FREE THYROXINE: CPT

## 2018-03-28 PROCEDURE — 36415 COLL VENOUS BLD VENIPUNCTURE: CPT

## 2018-03-28 PROCEDURE — 84443 ASSAY THYROID STIM HORMONE: CPT

## 2018-03-29 RX ORDER — LEVOTHYROXINE SODIUM 0.05 MG/1
TABLET ORAL
Qty: 30 TABLET | Refills: 5 | Status: SHIPPED | OUTPATIENT
Start: 2018-03-29 | End: 2018-10-05

## 2018-04-04 ENCOUNTER — OFFICE VISIT (OUTPATIENT)
Dept: SURGERY | Facility: CLINIC | Age: 68
End: 2018-04-04

## 2018-04-04 VITALS — BODY MASS INDEX: 32.25 KG/M2 | HEART RATE: 80 BPM | WEIGHT: 182 LBS | HEIGHT: 63 IN | TEMPERATURE: 98 F

## 2018-04-04 DIAGNOSIS — Z17.0 MALIGNANT NEOPLASM OF CENTRAL PORTION OF RIGHT BREAST IN FEMALE, ESTROGEN RECEPTOR POSITIVE (HCC): Primary | ICD-10-CM

## 2018-04-04 DIAGNOSIS — Z90.13 ACQUIRED ABSENCE OF BOTH BREASTS: ICD-10-CM

## 2018-04-04 DIAGNOSIS — R53.83 FATIGUE, UNSPECIFIED TYPE: ICD-10-CM

## 2018-04-04 DIAGNOSIS — C50.111 MALIGNANT NEOPLASM OF CENTRAL PORTION OF RIGHT BREAST IN FEMALE, ESTROGEN RECEPTOR POSITIVE (HCC): Primary | ICD-10-CM

## 2018-04-04 PROCEDURE — 99213 OFFICE O/P EST LOW 20 MIN: CPT | Performed by: SURGERY

## 2018-04-04 NOTE — PROGRESS NOTES
Follow Up Visit Note       Active Problems      1. Malignant neoplasm of central portion of right breast in female, estrogen receptor positive ([facility]    2. Acquired absence of both breasts    3.  Fatigue, unspecified type          Chief Complaint   Patient p 5/18/2017   • Breast cancer ([facility] [address] [facility] 12/4/2014    right breast   • Cancer [facility])    • Colon polyp, hyperplastic    • Colon polyp, hyperplastic    • Essential hypertension    • High blood pressure    • History of adenomatous polyp of colon 3/21/2018   • HTN (hy Dementia Mother    • Cancer Mother      Bladder Cancer   • htn Otto Raphael Mother    • osteoporosis Otto Raphael Mother    • Hypertension Mother    • Diabetes Father    • heart issues [OTHER] Paternal Grandfather    • Diabetes Maternal Grandmother      \"runs on mo B-12 500 MCG Oral Tab Take 500 mcg by mouth daily. Disp:  Rfl:    Pyridoxine HCl (VITAMIN B-6) 100 MG Oral Tab Take 100 mg by mouth daily. Disp:  Rfl:    aspirin 81 MG Oral Tab Take 81 mg by mouth daily.  Disp:  Rfl:    vitamin E 400 UNITS Oral Cap Take 4 place, and time. She appears well-developed and well-nourished. No distress. HENT:   Head: Normocephalic and atraumatic.    Mouth/Throat: Oropharynx is clear and moist.   Eyes: Conjunctivae and EOM are normal. Pupils are equal, round, and reactive to ligh mood and affect. Her speech is normal and behavior is normal. Judgment and thought content normal.   Nursing note and vitals reviewed.            Assessment   Malignant neoplasm of central portion of right breast in female, estrogen receptor positive ([facility] [address]

## 2018-04-05 ENCOUNTER — LAB ENCOUNTER (OUTPATIENT)
Dept: LAB | Age: 68
End: 2018-04-05
Attending: SURGERY
Payer: MEDICARE

## 2018-04-05 DIAGNOSIS — R53.83 FATIGUE, UNSPECIFIED TYPE: ICD-10-CM

## 2018-04-05 PROCEDURE — 85025 COMPLETE CBC W/AUTO DIFF WBC: CPT

## 2018-04-05 PROCEDURE — 36415 COLL VENOUS BLD VENIPUNCTURE: CPT

## 2018-04-06 PROBLEM — Z98.890 S/P COLONOSCOPY WITH POLYPECTOMY: Status: ACTIVE | Noted: 2018-04-06

## 2018-04-06 PROBLEM — K62.5 RECTAL BLEEDING: Status: ACTIVE | Noted: 2018-04-06

## 2018-04-09 ENCOUNTER — APPOINTMENT (OUTPATIENT)
Dept: HEMATOLOGY/ONCOLOGY | Age: 68
End: 2018-04-09
Payer: MEDICARE

## 2018-04-20 RX ORDER — FUROSEMIDE 40 MG/1
TABLET ORAL
Qty: 45 TABLET | Refills: 1 | Status: SHIPPED | OUTPATIENT
Start: 2018-04-20 | End: 2018-10-05

## 2018-04-20 RX ORDER — POTASSIUM CHLORIDE 20 MEQ/1
TABLET, EXTENDED RELEASE ORAL
Qty: 45 TABLET | Refills: 0 | Status: SHIPPED | OUTPATIENT
Start: 2018-04-20 | End: 2018-07-03

## 2018-04-20 NOTE — TELEPHONE ENCOUNTER
Last OV relevant to medication: 11/21/2017  Last refill date: 1/18/2018     #/refills: 45/0  When pt was asked to return for OV: none noted  Upcoming appt/reason: none    Lab Results  Component Value Date   K 4.0 08/21/2017

## 2018-04-23 RX ORDER — LETROZOLE 2.5 MG/1
TABLET, FILM COATED ORAL
Qty: 30 TABLET | Refills: 0 | Status: SHIPPED | OUTPATIENT
Start: 2018-04-23 | End: 2018-05-14

## 2018-04-23 RX ORDER — LISINOPRIL 10 MG/1
TABLET ORAL
Qty: 90 TABLET | Refills: 0 | Status: SHIPPED | OUTPATIENT
Start: 2018-04-23 | End: 2018-07-03

## 2018-05-06 NOTE — PROGRESS NOTES
[facility] Progress Note      Patient Name:  Willis Hodgson  YOB: 1950  Medical Record Number:  [id number]    Date of visit: 8/21/2017    CHIEF COMPLAINT: Stage I HER-2 positive right breast carcinoma status post bilateral mastect confusion or depression   Heme: no easy bruising or bleeding     ALLERGIES:    Adhesive Tape               Comment:Nola  Bacitracin                Codeine                 NAUSEA AND VOMITING  Doxycycline             RASH  Levaquin [Levofloxa FE) MG Oral Tab Take 65 mg by mouth daily. Disp:  Rfl:    Vitamin C (VITAMIN C) 500 MG Oral Tab Take 500 mg by mouth daily.    Disp:  Rfl:        VITALS:  Height: --  Weight: --  BSA (Calculated - sq m): --  Pulse: --  BP: --  Temp: --  Do Not Use - Resp

## 2018-05-07 ENCOUNTER — OFFICE VISIT (OUTPATIENT)
Dept: HEMATOLOGY/ONCOLOGY | Age: 68
End: 2018-05-07
Attending: INTERNAL MEDICINE
Payer: MEDICARE

## 2018-05-07 VITALS
WEIGHT: 189.38 LBS | OXYGEN SATURATION: 100 % | TEMPERATURE: 98 F | DIASTOLIC BLOOD PRESSURE: 72 MMHG | SYSTOLIC BLOOD PRESSURE: 108 MMHG | HEART RATE: 84 BPM | BODY MASS INDEX: 34 KG/M2 | RESPIRATION RATE: 20 BRPM

## 2018-05-07 DIAGNOSIS — E55.9 VITAMIN D DEFICIENCY: ICD-10-CM

## 2018-05-07 DIAGNOSIS — M25.552 PAIN OF BOTH HIP JOINTS: ICD-10-CM

## 2018-05-07 DIAGNOSIS — R23.2 HOT FLASHES RELATED TO AROMATASE INHIBITOR THERAPY: ICD-10-CM

## 2018-05-07 DIAGNOSIS — C50.111 MALIGNANT NEOPLASM OF CENTRAL PORTION OF RIGHT BREAST IN FEMALE, ESTROGEN RECEPTOR POSITIVE (HCC): Primary | ICD-10-CM

## 2018-05-07 DIAGNOSIS — T50.905D MEDICATION ADVERSE EFFECT, SUBSEQUENT ENCOUNTER: ICD-10-CM

## 2018-05-07 DIAGNOSIS — T45.1X5A HOT FLASHES RELATED TO AROMATASE INHIBITOR THERAPY: ICD-10-CM

## 2018-05-07 DIAGNOSIS — M25.551 PAIN OF BOTH HIP JOINTS: ICD-10-CM

## 2018-05-07 DIAGNOSIS — Z17.0 MALIGNANT NEOPLASM OF CENTRAL PORTION OF RIGHT BREAST IN FEMALE, ESTROGEN RECEPTOR POSITIVE (HCC): Primary | ICD-10-CM

## 2018-05-07 PROCEDURE — 99214 OFFICE O/P EST MOD 30 MIN: CPT | Performed by: INTERNAL MEDICINE

## 2018-05-07 NOTE — PROGRESS NOTES
[facility] Progress Note      Patient Name:  Valerie Lauren  YOB: 1950  Medical Record Number:  [id number]    Date of visit: 5/7/2018    CHIEF COMPLAINT: Stage I HER-2 positive right breast carcinoma status post bilateral mastecto alopecia, rash, pruritis  : no hematuria, dysuria or frequency  Psych: no confusion or depression   Heme: no easy bruising or bleeding     ALLERGIES:    Adhesive Tape               Comment:Nola  Bacitracin                Codeine Cap Take 1 capsule by mouth daily. Disp:  Rfl:    Ferrous Sulfate 325 (65 FE) MG Oral Tab Take 65 mg by mouth daily. Disp:  Rfl:    Vitamin C (VITAMIN C) 500 MG Oral Tab Take 500 mg by mouth daily.    Disp:  Rfl:        VITALS:  Height: --  Weight: 85.9 k 7/20/15. Completed a year of trastuzumab in 3/16. Started Anastrazole 11/15 and switched to exemestane in 12/16, resumed anastrozole in 4/17 for hair loss and switched to letrozole 5/17 at her request. Plan to continue endocrine therapy until 11/20.  Not

## 2018-05-07 NOTE — PROGRESS NOTES
Pt here for MD f/u visit for h/o breast ca. Pt continues on Letrozole 2. 5.mg daily. Generalized joint pain- wakes her up @ [place] [address] [place] [address]. Labs drawn.    Education Record    Learner:  Patient    Disease / Diagnosis:breast ca    Barriers / Limitations:  None   Comments

## 2018-05-08 ENCOUNTER — TELEPHONE (OUTPATIENT)
Dept: INTERNAL MEDICINE CLINIC | Facility: CLINIC | Age: 68
End: 2018-05-08

## 2018-05-08 ENCOUNTER — TELEPHONE (OUTPATIENT)
Dept: HEMATOLOGY/ONCOLOGY | Facility: HOSPITAL | Age: 68
End: 2018-05-08

## 2018-05-08 NOTE — TELEPHONE ENCOUNTER
Nahed Howard MD  [facility] [address] [facility] [address]             Call, vit d normal, creat bit high, should d/w pcp to see if diuretics or lisinopril need to be adjusted      Left VM with results, plan, and triage line for any questions.

## 2018-05-08 NOTE — TELEPHONE ENCOUNTER
Called the patient back. The patient stated that Dr. Jonda Claude ran some blood tests (Vitamin D and CMP), and was concerned about the elevated BUN and Creatinine and recommended she come back and see Dr. Angeline Jacobo to follow up on this.  Informed the patient to make

## 2018-05-08 NOTE — TELEPHONE ENCOUNTER
Pt rec'd results of blood work, told by Dr Gila Cannon to f/u with Dr Galvin Re re adjusting her LISINOPRIL 10 MG Oral Tab or diuretic. Pt not sure if she needs OV for this or if it can be adjusted over phone. Please advise.  Thank you

## 2018-05-14 RX ORDER — LETROZOLE 2.5 MG/1
TABLET, FILM COATED ORAL
Qty: 30 TABLET | Refills: 0 | Status: SHIPPED | OUTPATIENT
Start: 2018-05-14 | End: 2018-05-25

## 2018-05-22 ENCOUNTER — OFFICE VISIT (OUTPATIENT)
Dept: INTERNAL MEDICINE CLINIC | Facility: CLINIC | Age: 68
End: 2018-05-22

## 2018-05-22 VITALS
OXYGEN SATURATION: 98 % | RESPIRATION RATE: 16 BRPM | DIASTOLIC BLOOD PRESSURE: 70 MMHG | TEMPERATURE: 98 F | BODY MASS INDEX: 33.13 KG/M2 | SYSTOLIC BLOOD PRESSURE: 114 MMHG | HEART RATE: 81 BPM | WEIGHT: 187 LBS | HEIGHT: 63 IN

## 2018-05-22 DIAGNOSIS — N18.30 CKD (CHRONIC KIDNEY DISEASE) STAGE 3, GFR 30-59 ML/MIN (HCC): Primary | ICD-10-CM

## 2018-05-22 PROCEDURE — 99213 OFFICE O/P EST LOW 20 MIN: CPT | Performed by: INTERNAL MEDICINE

## 2018-05-22 NOTE — PROGRESS NOTES
Hong Cervantes is a 74year old female.  To F/U from last visit regarding CKD 3  HPI:    Interim history:Dr Kimberly Snow sent her to me to evaluate CKD 3  Going back to July 2015 her GFR has ranged from 49-61  She has lost 30 lb doing adelaide and she is very activ (VITAMIN C) 500 MG Oral Tab Take 500 mg by mouth daily. Disp:  Rfl:          Social History:  Smoking status: Never Smoker                                                              Smokeless tobacco: Never Used                      Alcohol use:  Yes (primary encounter diagnosis) stable since chemo 2015, mild , on ACE, BP very low, CPM    No orders of the defined types were placed in this encounter.       Meds & Refills for this Visit:  No prescriptions requested or ordered in this encounter    Imaging

## 2018-05-24 ENCOUNTER — TELEPHONE (OUTPATIENT)
Dept: INTERNAL MEDICINE CLINIC | Facility: CLINIC | Age: 68
End: 2018-05-24

## 2018-05-24 NOTE — TELEPHONE ENCOUNTER
We had a long discussion about his yesterday, not sure where the disconnect is  CKD considered early stage and she is borderline 3- very close to 2  Most common cause is DM.  2nd most common HTN and age  Not likely to progress as long as BP controlled  Does

## 2018-05-24 NOTE — TELEPHONE ENCOUNTER
The most important imiplication is for health care providers who prescribe medications, really not very important for her  Except to maintain a healthy weight, lifestyle, BP control, no DM, no smoking, no high cholesterol

## 2018-05-24 NOTE — TELEPHONE ENCOUNTER
Patient had a concern about the CKD Stage 3 diagnosis on her AVS from yesterday. She stated she was not aware she had it, only that her BUN and creatinine was elevated, but did not think that meant she had a stage 3 kidney disease.  The patient would like t

## 2018-05-25 RX ORDER — LETROZOLE 2.5 MG/1
2.5 TABLET, FILM COATED ORAL
Qty: 30 TABLET | Refills: 0 | Status: SHIPPED | OUTPATIENT
Start: 2018-05-25 | End: 2018-07-03

## 2018-07-03 RX ORDER — LETROZOLE 2.5 MG/1
2.5 TABLET, FILM COATED ORAL
Qty: 90 TABLET | Refills: 3 | Status: SHIPPED | OUTPATIENT
Start: 2018-07-03 | End: 2019-12-16

## 2018-07-06 RX ORDER — POTASSIUM CHLORIDE 20 MEQ/1
TABLET, EXTENDED RELEASE ORAL
Qty: 45 TABLET | Refills: 0 | Status: SHIPPED | OUTPATIENT
Start: 2018-07-06 | End: 2018-10-05

## 2018-07-06 RX ORDER — LISINOPRIL 10 MG/1
TABLET ORAL
Qty: 90 TABLET | Refills: 0 | Status: SHIPPED | OUTPATIENT
Start: 2018-07-06 | End: 2018-10-05

## 2018-08-29 ENCOUNTER — TELEPHONE (OUTPATIENT)
Dept: INTERNAL MEDICINE CLINIC | Facility: CLINIC | Age: 68
End: 2018-08-29

## 2018-08-29 NOTE — TELEPHONE ENCOUNTER
Patient calling regarding Aleeve and BP medication to confirm if both can be taken at the same time. Please advise. Thank you.

## 2018-09-18 ENCOUNTER — OFFICE VISIT (OUTPATIENT)
Dept: INTERNAL MEDICINE CLINIC | Facility: CLINIC | Age: 68
End: 2018-09-18
Payer: MEDICARE

## 2018-09-18 VITALS
OXYGEN SATURATION: 100 % | RESPIRATION RATE: 16 BRPM | BODY MASS INDEX: 33.78 KG/M2 | WEIGHT: 190.63 LBS | TEMPERATURE: 98 F | HEIGHT: 63 IN | SYSTOLIC BLOOD PRESSURE: 122 MMHG | DIASTOLIC BLOOD PRESSURE: 72 MMHG | HEART RATE: 81 BPM

## 2018-09-18 DIAGNOSIS — M70.61 TROCHANTERIC BURSITIS OF RIGHT HIP: Primary | ICD-10-CM

## 2018-09-18 PROCEDURE — 99213 OFFICE O/P EST LOW 20 MIN: CPT | Performed by: INTERNAL MEDICINE

## 2018-09-18 PROCEDURE — 90653 IIV ADJUVANT VACCINE IM: CPT | Performed by: INTERNAL MEDICINE

## 2018-09-18 PROCEDURE — G0008 ADMIN INFLUENZA VIRUS VAC: HCPCS | Performed by: INTERNAL MEDICINE

## 2018-09-18 RX ORDER — DICLOFENAC SODIUM 75 MG/1
75 TABLET, DELAYED RELEASE ORAL 2 TIMES DAILY WITH MEALS
Qty: 40 TABLET | Refills: 0 | Status: SHIPPED | OUTPATIENT
Start: 2018-09-18 | End: 2019-02-11 | Stop reason: ALTCHOICE

## 2018-09-18 NOTE — PROGRESS NOTES
Jony Contreras is a 79year old female  Patient presents with:  Hip Pain: Both - Right is Worse      HPI:   Hip pain for about a year  Could be letrozole, Dr Vidhi Reid said  Her  3 sisters and her dtr have hip issues \"hip moving forward\", waddles when she daily. Disp:  Rfl:    Ferrous Sulfate 325 (65 FE) MG Oral Tab Take 65 mg by mouth daily. Disp:  Rfl:    Vitamin C (VITAMIN C) 500 MG Oral Tab Take 500 mg by mouth daily.    Disp:  Rfl:       Patient Active Problem List:     Rosacea     Venous insufficienc Visit:  Requested Prescriptions     Signed Prescriptions Disp Refills   • Diclofenac Sodium 75 MG Oral Tab EC 40 tablet 0     Sig: Take 1 tablet (75 mg total) by mouth 2 (two) times daily with meals.        Imaging & Consults:  OP REFERRAL TO NEISHA RODAS

## 2018-09-19 RX ORDER — DICLOFENAC SODIUM 75 MG/1
TABLET, DELAYED RELEASE ORAL
Qty: 180 TABLET | Refills: 0 | OUTPATIENT
Start: 2018-09-19

## 2018-10-02 ENCOUNTER — OFFICE VISIT (OUTPATIENT)
Dept: PHYSICAL THERAPY | Age: 68
End: 2018-10-02
Attending: INTERNAL MEDICINE
Payer: MEDICARE

## 2018-10-02 DIAGNOSIS — M70.61 TROCHANTERIC BURSITIS OF RIGHT HIP: ICD-10-CM

## 2018-10-02 PROCEDURE — 97163 PT EVAL HIGH COMPLEX 45 MIN: CPT

## 2018-10-02 PROCEDURE — 97110 THERAPEUTIC EXERCISES: CPT

## 2018-10-02 NOTE — PROGRESS NOTES
LOWER EXTREMITY EVALUATION:   Referring Physician: Dr. Espinoza Hanna  Diagnosis: Trochanteric bursitis of right hip (M70.61)        Date of Service: 10/2/2018     PATIENT SUMMARY   Jony Contreras is a 79year old y/o female who presents to therapy today Observation: level iliac crest in stance  Gait: antalgic steps after sitting, decreased R stance time   Palpation: (+) tenderness multiple muscles groups to palpation, over hip greater trochanter, ITB and into glut medius  Sensation: no c/o N/T    AROM: 90 day period. Treatment will include: Manual Therapy; Therapeutic Exercises; Neuromuscular Re-education;  Therapeutic Activity; Gait Training; Pt education; Home exercise program instructions    Education or treatment limitation: None  Rehab Potential:good

## 2018-10-05 ENCOUNTER — OFFICE VISIT (OUTPATIENT)
Dept: PHYSICAL THERAPY | Age: 68
End: 2018-10-05
Attending: INTERNAL MEDICINE
Payer: MEDICARE

## 2018-10-05 DIAGNOSIS — M70.61 TROCHANTERIC BURSITIS OF RIGHT HIP: ICD-10-CM

## 2018-10-05 PROCEDURE — 97110 THERAPEUTIC EXERCISES: CPT

## 2018-10-05 PROCEDURE — 97140 MANUAL THERAPY 1/> REGIONS: CPT

## 2018-10-05 PROCEDURE — 97112 NEUROMUSCULAR REEDUCATION: CPT

## 2018-10-05 NOTE — PROGRESS NOTES
Dx: Trochanteric bursitis of right hip (M70.61)            Authorized # of Visits:  10         Next MD visit: Juan  Fall Risk: standard         Precautions: tape allergy, cancer            Subjective:3-4/10 pain rating.   Pt states she was a \"lucas Man x 1 (10')  Total Timed Treatment: 42 min     Total Treatment Time: 47 min

## 2018-10-08 RX ORDER — LISINOPRIL 10 MG/1
TABLET ORAL
Qty: 90 TABLET | Refills: 0 | Status: SHIPPED | OUTPATIENT
Start: 2018-10-08 | End: 2019-01-08

## 2018-10-08 RX ORDER — FUROSEMIDE 40 MG/1
TABLET ORAL
Qty: 45 TABLET | Refills: 0 | Status: SHIPPED | OUTPATIENT
Start: 2018-10-08 | End: 2019-01-08

## 2018-10-08 RX ORDER — LEVOTHYROXINE SODIUM 0.05 MG/1
TABLET ORAL
Qty: 30 TABLET | Refills: 0 | Status: SHIPPED | OUTPATIENT
Start: 2018-10-08 | End: 2018-10-15

## 2018-10-08 RX ORDER — POTASSIUM CHLORIDE 20 MEQ/1
TABLET, EXTENDED RELEASE ORAL
Qty: 45 TABLET | Refills: 0 | Status: SHIPPED | OUTPATIENT
Start: 2018-10-08 | End: 2019-01-08

## 2018-10-08 NOTE — TELEPHONE ENCOUNTER
Last OV relevant to medication: 11/21/17 PE, last OV was 9/18/18 for hip pain  Last refill date: 7/6/18     #/refills: 45/0  When pt was asked to return for OV: none  Upcoming appt/reason: none

## 2018-10-10 ENCOUNTER — OFFICE VISIT (OUTPATIENT)
Dept: SURGERY | Facility: CLINIC | Age: 68
End: 2018-10-10
Payer: MEDICARE

## 2018-10-10 VITALS — TEMPERATURE: 98 F | HEART RATE: 84 BPM | HEIGHT: 63 IN | BODY MASS INDEX: 33.66 KG/M2 | WEIGHT: 190 LBS

## 2018-10-10 DIAGNOSIS — Z90.13 ACQUIRED ABSENCE OF BOTH BREASTS: ICD-10-CM

## 2018-10-10 DIAGNOSIS — Z17.0 MALIGNANT NEOPLASM OF CENTRAL PORTION OF RIGHT BREAST IN FEMALE, ESTROGEN RECEPTOR POSITIVE (HCC): Primary | ICD-10-CM

## 2018-10-10 DIAGNOSIS — R19.01 ABDOMINAL WALL MASS OF RIGHT UPPER QUADRANT: ICD-10-CM

## 2018-10-10 DIAGNOSIS — C50.111 MALIGNANT NEOPLASM OF CENTRAL PORTION OF RIGHT BREAST IN FEMALE, ESTROGEN RECEPTOR POSITIVE (HCC): Primary | ICD-10-CM

## 2018-10-10 PROCEDURE — 99213 OFFICE O/P EST LOW 20 MIN: CPT | Performed by: SURGERY

## 2018-10-10 NOTE — PROGRESS NOTES
Follow Up Visit Note       Active Problems      1. Malignant neoplasm of central portion of right breast in female, estrogen receptor positive ([address]    2. Acquired absence of both breasts    3.  Abdominal wall mass of right upper quadrant          Chief Com (adult) (pediatric) Edward PSG 1-30-16    AHI 15 supine AHI 22 non-supien AHI 8 SaO2 faith 68 %   • Onychomycosis    • JAMES on CPAP 2/19/2016   • Periorbital swelling     nickel allergy   • Personal history of antineoplastic chemotherapy 7/13/15   • PONV (p family history and social history have been reviewed by me today.     Family History   Problem Relation Age of Onset   • Glaucoma Mother    • Dementia Mother    • Cancer Mother         Bladder Cancer   • Hypertension Mother    • Maryluisth Courser' dystrophy Mother    • TAKE 1 TABLET BY MOUTH EVERY OTHER DAY WITH FUROSEMIDE Disp: 45 tablet Rfl: 0   FUROSEMIDE 40 MG Oral Tab TAKE 1 TABLET(40 MG) BY MOUTH EVERY OTHER DAY Disp: 45 tablet Rfl: 0   LEVOTHYROXINE SODIUM 50 MCG Oral Tab TAKE 1 TABLET(50 MCG) BY MOUTH BEFORE NIYA nosebleeds, sore throat and trouble swallowing. Respiratory: Negative for apnea, cough, shortness of breath and wheezing. Cardiovascular: Negative for chest pain, palpitations and leg swelling.    Gastrointestinal: Negative for abdominal distention, a mass. There is no hepatosplenomegaly. There is no tenderness. There is no rigidity, no guarding, no CVA tenderness, no tenderness at McBurney's point and negative Mendes's sign.        3 cm mass in the right upper quadrant   Musculoskeletal: Normal range of We will discuss this further after the results of her CT are known.     Imaging & Referrals   CT CHEST+ABDOMEN+PELVIS(ALL CNTRST ONLY)(MPG=[id number])    Rojas Lynch MD

## 2018-10-15 ENCOUNTER — OFFICE VISIT (OUTPATIENT)
Dept: PHYSICAL THERAPY | Age: 68
End: 2018-10-15
Attending: INTERNAL MEDICINE
Payer: MEDICARE

## 2018-10-15 DIAGNOSIS — M70.61 TROCHANTERIC BURSITIS OF RIGHT HIP: ICD-10-CM

## 2018-10-15 PROCEDURE — 97110 THERAPEUTIC EXERCISES: CPT

## 2018-10-15 PROCEDURE — 97112 NEUROMUSCULAR REEDUCATION: CPT

## 2018-10-15 PROCEDURE — 97140 MANUAL THERAPY 1/> REGIONS: CPT

## 2018-10-15 RX ORDER — LEVOTHYROXINE SODIUM 0.05 MG/1
TABLET ORAL
Qty: 90 TABLET | Refills: 1 | Status: SHIPPED | OUTPATIENT
Start: 2018-10-15 | End: 2019-02-11

## 2018-10-15 NOTE — TELEPHONE ENCOUNTER
Original Prescription is suppose to get patient a years worth, which would be until March 2019. Patient requesting 90 day supply. Pended 90/1 of medication.  Routed to Dr. Marily Cobian for approval.     Note: Patient needs to be called, and 30 day supply cancelled

## 2018-10-15 NOTE — PROGRESS NOTES
Dx: Trochanteric bursitis of right hip (M70.61)            Authorized # of Visits:  10         Next MD visit: Juan  Fall Risk: standard         Precautions: tape allergy, cancer            Subjective:  Pt states she still has the same pain.    She HEP        CP x 5' Unable to tolerate        Skilled Services: pt education, exercise instruction and cueing for form, manual work    Charges: Radha 1 (20')   NR x 1 (13')   Man x 1 (10')  Total Timed Treatment: 43 min     Total Treatment Time: 43 min

## 2018-10-15 NOTE — TELEPHONE ENCOUNTER
Patient following up regarding refill request for Levothyroxine. Patient stated she wanted a 90 day supply (only received 30 day supply).  Preferred Pharmacy: [address] [place] [address] - [address] [place] [address] [address]

## 2018-10-17 ENCOUNTER — OFFICE VISIT (OUTPATIENT)
Dept: PHYSICAL THERAPY | Age: 68
End: 2018-10-17
Attending: INTERNAL MEDICINE
Payer: MEDICARE

## 2018-10-17 DIAGNOSIS — M70.61 TROCHANTERIC BURSITIS OF RIGHT HIP: ICD-10-CM

## 2018-10-17 PROCEDURE — 97140 MANUAL THERAPY 1/> REGIONS: CPT

## 2018-10-17 PROCEDURE — 97530 THERAPEUTIC ACTIVITIES: CPT

## 2018-10-17 PROCEDURE — 97110 THERAPEUTIC EXERCISES: CPT

## 2018-10-17 NOTE — PROGRESS NOTES
Dx: Trochanteric bursitis of right hip (M70.61)            Authorized # of Visits:  10         Next MD visit: Juan  Fall Risk: standard         Precautions: tape allergy, cancer            Subjective:  Pt states she has great difficulty getting up each LE  Added small pillow       BKFO stretch 5x STM/roller R ITB, hip rotators/glut med       Full body ext stretch instructed        Piriformis stretch instructed options Review of lumbar/hip stretches from classes    Pt ed on disease process, ITB overu

## 2018-10-20 ENCOUNTER — HOSPITAL ENCOUNTER (OUTPATIENT)
Dept: CT IMAGING | Age: 68
Discharge: HOME | End: 2018-10-20
Attending: SURGERY
Payer: MEDICARE

## 2018-10-20 DIAGNOSIS — Z17.0 MALIGNANT NEOPLASM OF CENTRAL PORTION OF RIGHT BREAST IN FEMALE, ESTROGEN RECEPTOR POSITIVE (HCC): ICD-10-CM

## 2018-10-20 DIAGNOSIS — C50.111 MALIGNANT NEOPLASM OF CENTRAL PORTION OF RIGHT BREAST IN FEMALE, ESTROGEN RECEPTOR POSITIVE (HCC): ICD-10-CM

## 2018-10-20 PROCEDURE — 82565 ASSAY OF CREATININE: CPT

## 2018-10-20 PROCEDURE — 71260 CT THORAX DX C+: CPT | Performed by: SURGERY

## 2018-10-20 PROCEDURE — 74177 CT ABD & PELVIS W/CONTRAST: CPT | Performed by: SURGERY

## 2018-10-22 ENCOUNTER — OFFICE VISIT (OUTPATIENT)
Dept: PHYSICAL THERAPY | Age: 68
End: 2018-10-22
Attending: INTERNAL MEDICINE
Payer: MEDICARE

## 2018-10-22 DIAGNOSIS — M70.61 TROCHANTERIC BURSITIS OF RIGHT HIP: ICD-10-CM

## 2018-10-22 PROCEDURE — 97530 THERAPEUTIC ACTIVITIES: CPT

## 2018-10-22 PROCEDURE — 97110 THERAPEUTIC EXERCISES: CPT

## 2018-10-22 PROCEDURE — 97140 MANUAL THERAPY 1/> REGIONS: CPT

## 2018-10-22 NOTE — PROGRESS NOTES
Dx: Trochanteric bursitis of right hip (M70.61)            Authorized # of Visits:  10         Next MD visit: Juan  Fall Risk: standard         Precautions: tape allergy, cancer            Subjective:  Pt states her hip \"isn't too bad today\" floor tranfers in half kneel Gait hallway 3'      STM/roller R ITB, hip rotators/glut med Facing chair mini squat 5x STM/roller R ITB, hip rotators/glut med STM/roller R ITB, hip rotators/glut med      Clam 10x2 10x2 10x2 each LE  Added small pillow Clam 1

## 2018-10-24 ENCOUNTER — OFFICE VISIT (OUTPATIENT)
Dept: PHYSICAL THERAPY | Age: 68
End: 2018-10-24
Attending: INTERNAL MEDICINE
Payer: MEDICARE

## 2018-10-24 DIAGNOSIS — M70.61 TROCHANTERIC BURSITIS OF RIGHT HIP: ICD-10-CM

## 2018-10-24 PROCEDURE — 97110 THERAPEUTIC EXERCISES: CPT

## 2018-10-24 PROCEDURE — 97530 THERAPEUTIC ACTIVITIES: CPT

## 2018-10-24 PROCEDURE — 97140 MANUAL THERAPY 1/> REGIONS: CPT

## 2018-10-24 NOTE — PROGRESS NOTES
Dx: Trochanteric bursitis of right hip (M70.61)            Authorized # of Visits:  10         Next MD visit: Juan  Fall Risk: standard         Precautions: tape allergy, cancer            Subjective:  Pt states she still feels uneven when she wal 5x2 each Standing facing chair 5x2 Standing facing chair  5x2 functional squat 5x Standing facing chair  5x2 functional squat 5x     Wt shifts --> SLS trials 2' Standing functional squat training 5' Body mech squat, floor tranfers in half kneel Gait [facility]

## 2018-10-26 ENCOUNTER — PATIENT OUTREACH (OUTPATIENT)
Dept: SURGERY | Facility: CLINIC | Age: 68
End: 2018-10-26

## 2018-10-29 ENCOUNTER — OFFICE VISIT (OUTPATIENT)
Dept: PHYSICAL THERAPY | Age: 68
End: 2018-10-29
Attending: INTERNAL MEDICINE
Payer: MEDICARE

## 2018-10-29 DIAGNOSIS — M70.61 TROCHANTERIC BURSITIS OF RIGHT HIP: ICD-10-CM

## 2018-10-29 PROCEDURE — 97112 NEUROMUSCULAR REEDUCATION: CPT

## 2018-10-29 PROCEDURE — 97140 MANUAL THERAPY 1/> REGIONS: CPT

## 2018-10-29 PROCEDURE — 97110 THERAPEUTIC EXERCISES: CPT

## 2018-10-29 NOTE — PROGRESS NOTES
Dx: Trochanteric bursitis of right hip (M70.61)            Authorized # of Visits:  10         Next MD visit: Juan  Fall Risk: standard         Precautions: tape allergy, cancer            Subjective:  Pt states she was very busy over the weekend, shifts --> SLS trials 2' Mirror  -wt shifts, SLS with light UE support Rocker board ML  10x BB  AP  ML  20x BB  AP  ML  20x    Flight steps training, recip Standing hip abd with light UE support 5x2 each Standing facing chair 5x2 Standing facing chair  5x2

## 2018-11-05 ENCOUNTER — OFFICE VISIT (OUTPATIENT)
Dept: PHYSICAL THERAPY | Age: 68
End: 2018-11-05
Attending: INTERNAL MEDICINE
Payer: MEDICARE

## 2018-11-05 PROCEDURE — 97112 NEUROMUSCULAR REEDUCATION: CPT

## 2018-11-05 PROCEDURE — 97110 THERAPEUTIC EXERCISES: CPT

## 2018-11-05 NOTE — PROGRESS NOTES
Dx: Trochanteric bursitis of right hip (M70.61)            Authorized # of Visits:  10         Next MD visit: Juan  Fall Risk: standard         Precautions: tape allergy, cancer            Subjective:  Pt states her L hip was more painful than the program.       Date: 10/5/2018  Tx#: 2/10 Date: 10/15/18  Tx#: 3/10 Date: 10/17/18  Tx#: 4/ Date: 10/22/18  Tx#: 5/ Date: 10/24/18  Tx#: 6/ Date: 10/29/18  Tx#: 7/ Date: 11/5/18  Tx#: 8/   recumb bike x5' Nustep x5 x5' x5' x6' x6' L5 x6' L5   Rocker board NR x 1 (10')  Total Timed Treatment: 43 min     Total Treatment Time: 43 min

## 2018-11-14 ENCOUNTER — APPOINTMENT (OUTPATIENT)
Dept: PHYSICAL THERAPY | Age: 68
End: 2018-11-14
Payer: MEDICARE

## 2018-11-19 ENCOUNTER — OFFICE VISIT (OUTPATIENT)
Dept: PHYSICAL THERAPY | Age: 68
End: 2018-11-19
Attending: INTERNAL MEDICINE
Payer: MEDICARE

## 2018-11-19 PROCEDURE — 97140 MANUAL THERAPY 1/> REGIONS: CPT

## 2018-11-19 PROCEDURE — 97110 THERAPEUTIC EXERCISES: CPT

## 2018-11-19 PROCEDURE — 97112 NEUROMUSCULAR REEDUCATION: CPT

## 2018-11-19 NOTE — PROGRESS NOTES
Dx: Trochanteric bursitis of right hip (M70.61)            Authorized # of Visits:  10         Next MD visit: Juan  Fall Risk: standard         Precautions: tape allergy, cancer            Subjective:  Pt states her hip and back were very sore and visit remaining/ reassess next visit.       Date: 10/5/2018  Tx#: 2/10 Date: 10/15/18  Tx#: 3/10 Date: 10/17/18  Tx#: 4/ Date: 10/22/18  Tx#: 5/ Date: 10/24/18  Tx#: 6/ Date: 10/29/18  Tx#: 7/ Date: 11/5/18  Tx#: 8/ 11/19/18  9/10    recumb bike x5' [facility] x7 HEP review answering pt questions SLR with ab control R 6x  L 8x     LTR x3 Bridge 5x - HEP  Sit <--> stand no UE assits        CP x 5' Unable to tolerate          Skilled Services: pt education, exercise instruction and cueing for form, manual work

## 2018-11-25 NOTE — PROGRESS NOTES
[facility] Progress Note      Patient Name:  Queen Amor  YOB: 1950  Medical Record Number:  [id number]    Date of visit: 11/26/2018    CHIEF COMPLAINT: Stage I HER-2 positive right breast carcinoma status post bilateral mastec   Pcns [Penicillins]      RASH  Polysporin [id number]*        MEDICATIONS:      Current Outpatient Medications:  Magnesium 500 MG Oral Tab Take 1 tablet by mouth daily.  Disp:  Rfl:    Levothyroxine Sodium 50 MCG Oral Tab TAKE 1 TABLET(50 MCG) BY MOUTH Rfl: 0       VITALS:  Height: --  Weight: 87.1 kg (192 lb) (1328)  BSA (Calculated - sq m): --  Pulse: 85 (1328)  BP: 123/76 (1328)  Temp: 97.5 °F (36.4 °C) (1328)  Do Not Use - Resp Rate: --  SpO2: 99 % (1328)    PS:  ECO - 46.3 fL    Neutrophil Absolute Prelim 3.88 1.30 - 6.70 x10 (3) uL    Neutrophil Absolute 3.88 1.30 - 6.70 x10(3) uL    Lymphocyte Absolute 1.81 0.90 - 4.00 x10(3) uL    Monocyte Absolute 0.44 0.10 - 1.00 x10(3) uL    Eosinophil Absolute 0.16 0.00 - 0.30

## 2018-11-26 ENCOUNTER — OFFICE VISIT (OUTPATIENT)
Dept: HEMATOLOGY/ONCOLOGY | Age: 68
End: 2018-11-26
Attending: INTERNAL MEDICINE
Payer: MEDICARE

## 2018-11-26 ENCOUNTER — TELEPHONE (OUTPATIENT)
Dept: HEMATOLOGY/ONCOLOGY | Facility: HOSPITAL | Age: 68
End: 2018-11-26

## 2018-11-26 ENCOUNTER — OFFICE VISIT (OUTPATIENT)
Dept: PHYSICAL THERAPY | Age: 68
End: 2018-11-26
Attending: INTERNAL MEDICINE
Payer: MEDICARE

## 2018-11-26 VITALS
DIASTOLIC BLOOD PRESSURE: 76 MMHG | OXYGEN SATURATION: 99 % | SYSTOLIC BLOOD PRESSURE: 123 MMHG | RESPIRATION RATE: 20 BRPM | WEIGHT: 192 LBS | TEMPERATURE: 98 F | HEART RATE: 85 BPM | BODY MASS INDEX: 34 KG/M2

## 2018-11-26 DIAGNOSIS — M89.9 BONE DISORDER: ICD-10-CM

## 2018-11-26 DIAGNOSIS — E03.9 HYPOTHYROIDISM, UNSPECIFIED TYPE: ICD-10-CM

## 2018-11-26 DIAGNOSIS — Z17.0 MALIGNANT NEOPLASM OF CENTRAL PORTION OF RIGHT BREAST IN FEMALE, ESTROGEN RECEPTOR POSITIVE (HCC): Primary | ICD-10-CM

## 2018-11-26 DIAGNOSIS — M83.8 OTHER ADULT OSTEOMALACIA: ICD-10-CM

## 2018-11-26 DIAGNOSIS — C50.111 MALIGNANT NEOPLASM OF CENTRAL PORTION OF RIGHT BREAST IN FEMALE, ESTROGEN RECEPTOR POSITIVE (HCC): Primary | ICD-10-CM

## 2018-11-26 PROCEDURE — 97112 NEUROMUSCULAR REEDUCATION: CPT

## 2018-11-26 PROCEDURE — 99213 OFFICE O/P EST LOW 20 MIN: CPT | Performed by: INTERNAL MEDICINE

## 2018-11-26 PROCEDURE — 97110 THERAPEUTIC EXERCISES: CPT

## 2018-11-26 RX ORDER — THIAMINE HCL 100 MG
1 TABLET ORAL DAILY
COMMUNITY

## 2018-11-26 NOTE — PROGRESS NOTES
Pt here for MD f/u visit for h/o breast ca. Generalized joint pain- wakes her up @ [place] [place]. Going to PT for back and hip pain. Labs drawn.      Outpatient Oncology Care Plan  Problem list:  knowledge deficit    Problems related to:    disease/disease progression

## 2018-11-26 NOTE — PROGRESS NOTES
Dx: Trochanteric bursitis of right hip (M70.61)            Authorized # of Visits:  10         Next MD visit: Juan  Fall Risk: standard         Precautions: tape allergy, cancer           Progress /Discharge Summary    Pt has attended 10, cancelle decreased risk of falls on uneven surfaces. Able to perform functional squat to reach floor objects.    --> met, pain depending on the day  Increase functional hip strength to enable reciprocal negotiation of flight stairs safely --> typically met    Plan: STM/roller R ITB, hip rotators/glut med 5'   STM/roller R ITB, hip rotators/glut med Facing chair mini squat 5x STM/roller R ITB, hip rotators/glut med STM/roller R ITB, hip rotators/glut med STM/roller R ITB, hip rotators/glut med Hip flexor stretch off s

## 2018-11-26 NOTE — TELEPHONE ENCOUNTER
Did not have sample to do Vitamin D level- pt needs another lab draw. MD notified. Pt called. NA. LMTCB office to schedule PL.

## 2018-11-28 ENCOUNTER — NURSE ONLY (OUTPATIENT)
Dept: HEMATOLOGY/ONCOLOGY | Age: 68
End: 2018-11-28
Attending: INTERNAL MEDICINE
Payer: MEDICARE

## 2018-11-28 DIAGNOSIS — M89.9 BONE DISORDER: ICD-10-CM

## 2018-11-28 DIAGNOSIS — C50.111 MALIGNANT NEOPLASM OF CENTRAL PORTION OF RIGHT BREAST IN FEMALE, ESTROGEN RECEPTOR POSITIVE (HCC): ICD-10-CM

## 2018-11-28 DIAGNOSIS — M83.8 OTHER ADULT OSTEOMALACIA: ICD-10-CM

## 2018-11-28 DIAGNOSIS — E03.9 HYPOTHYROIDISM, UNSPECIFIED TYPE: ICD-10-CM

## 2018-11-28 DIAGNOSIS — Z17.0 MALIGNANT NEOPLASM OF CENTRAL PORTION OF RIGHT BREAST IN FEMALE, ESTROGEN RECEPTOR POSITIVE (HCC): ICD-10-CM

## 2018-11-28 PROCEDURE — 82306 VITAMIN D 25 HYDROXY: CPT

## 2018-11-28 PROCEDURE — 36415 COLL VENOUS BLD VENIPUNCTURE: CPT

## 2019-01-11 RX ORDER — LISINOPRIL 10 MG/1
TABLET ORAL
Qty: 90 TABLET | Refills: 0 | Status: SHIPPED | OUTPATIENT
Start: 2019-01-11 | End: 2019-02-11

## 2019-01-11 RX ORDER — POTASSIUM CHLORIDE 20 MEQ/1
TABLET, EXTENDED RELEASE ORAL
Qty: 45 TABLET | Refills: 0 | Status: SHIPPED | OUTPATIENT
Start: 2019-01-11 | End: 2019-02-11

## 2019-01-11 RX ORDER — FUROSEMIDE 40 MG/1
TABLET ORAL
Qty: 45 TABLET | Refills: 0 | Status: SHIPPED | OUTPATIENT
Start: 2019-01-11 | End: 2019-02-11

## 2019-01-11 NOTE — TELEPHONE ENCOUNTER
Last OV relevant to medication: 5/22/18  Last refill date: 10/8/18     #/refills: 45/0  When pt was asked to return for OV: none  Upcoming appt and reason: 2/11/18, AWV   Lab Results   Component Value Date    GLU 87 11/26/2018    BUN 22 (H) 11/26/2018    B

## 2019-02-11 ENCOUNTER — APPOINTMENT (OUTPATIENT)
Dept: LAB | Age: 69
End: 2019-02-11
Attending: INTERNAL MEDICINE
Payer: MEDICARE

## 2019-02-11 ENCOUNTER — OFFICE VISIT (OUTPATIENT)
Dept: INTERNAL MEDICINE CLINIC | Facility: CLINIC | Age: 69
End: 2019-02-11
Payer: MEDICARE

## 2019-02-11 VITALS
HEART RATE: 77 BPM | HEIGHT: 62.5 IN | DIASTOLIC BLOOD PRESSURE: 70 MMHG | OXYGEN SATURATION: 100 % | BODY MASS INDEX: 35.7 KG/M2 | SYSTOLIC BLOOD PRESSURE: 122 MMHG | WEIGHT: 199 LBS | RESPIRATION RATE: 16 BRPM | TEMPERATURE: 98 F

## 2019-02-11 DIAGNOSIS — I10 ESSENTIAL HYPERTENSION: ICD-10-CM

## 2019-02-11 DIAGNOSIS — I87.2 VENOUS INSUFFICIENCY: ICD-10-CM

## 2019-02-11 DIAGNOSIS — Z23 NEED FOR VACCINATION: ICD-10-CM

## 2019-02-11 DIAGNOSIS — Z00.00 ENCOUNTER FOR ANNUAL HEALTH EXAMINATION: Primary | ICD-10-CM

## 2019-02-11 DIAGNOSIS — Z11.59 NEED FOR HEPATITIS C SCREENING TEST: ICD-10-CM

## 2019-02-11 DIAGNOSIS — F51.01 PRIMARY INSOMNIA: ICD-10-CM

## 2019-02-11 DIAGNOSIS — E03.9 ACQUIRED HYPOTHYROIDISM: ICD-10-CM

## 2019-02-11 PROBLEM — N18.30 CKD (CHRONIC KIDNEY DISEASE) STAGE 3, GFR 30-59 ML/MIN (HCC): Status: RESOLVED | Noted: 2018-05-22 | Resolved: 2019-02-11

## 2019-02-11 PROBLEM — Z98.890 S/P COLONOSCOPY WITH POLYPECTOMY: Status: RESOLVED | Noted: 2018-04-06 | Resolved: 2019-02-11

## 2019-02-11 PROBLEM — K62.5 RECTAL BLEEDING: Status: RESOLVED | Noted: 2018-04-06 | Resolved: 2019-02-11

## 2019-02-11 LAB — HCV AB SERPL QL IA: NONREACTIVE

## 2019-02-11 PROCEDURE — 86803 HEPATITIS C AB TEST: CPT

## 2019-02-11 PROCEDURE — 36415 COLL VENOUS BLD VENIPUNCTURE: CPT

## 2019-02-11 PROCEDURE — G0438 PPPS, INITIAL VISIT: HCPCS | Performed by: INTERNAL MEDICINE

## 2019-02-11 PROCEDURE — 90732 PPSV23 VACC 2 YRS+ SUBQ/IM: CPT | Performed by: INTERNAL MEDICINE

## 2019-02-11 PROCEDURE — G0009 ADMIN PNEUMOCOCCAL VACCINE: HCPCS | Performed by: INTERNAL MEDICINE

## 2019-02-11 PROCEDURE — 99213 OFFICE O/P EST LOW 20 MIN: CPT | Performed by: INTERNAL MEDICINE

## 2019-02-11 RX ORDER — POTASSIUM CHLORIDE 20 MEQ/1
TABLET, EXTENDED RELEASE ORAL
Qty: 45 TABLET | Refills: 3 | Status: SHIPPED | OUTPATIENT
Start: 2019-02-11 | End: 2020-06-12

## 2019-02-11 RX ORDER — LISINOPRIL 10 MG/1
TABLET ORAL
Qty: 90 TABLET | Refills: 3 | Status: SHIPPED | OUTPATIENT
Start: 2019-02-11 | End: 2020-06-12

## 2019-02-11 RX ORDER — LEVOTHYROXINE SODIUM 0.05 MG/1
TABLET ORAL
Qty: 90 TABLET | Refills: 3 | Status: SHIPPED | OUTPATIENT
Start: 2019-02-11 | End: 2019-12-16

## 2019-02-11 RX ORDER — FUROSEMIDE 40 MG/1
TABLET ORAL
Qty: 45 TABLET | Refills: 3 | Status: SHIPPED | OUTPATIENT
Start: 2019-02-11 | End: 2020-06-12

## 2019-02-11 RX ORDER — ESZOPICLONE 1 MG/1
1 TABLET, FILM COATED ORAL NIGHTLY PRN
Qty: 30 TABLET | Refills: 0 | Status: SHIPPED | OUTPATIENT
Start: 2019-02-11 | End: 2019-05-06

## 2019-02-11 NOTE — PATIENT INSTRUCTIONS
Vianney Daniel's SCREENING SCHEDULE   Tests on this list are recommended by your physician but may not be covered, or covered at this frequency, by your insurer. Please check with your insurance carrier before scheduling to verify coverage.    PREVENTATI Anyone with a family history    Colorectal Cancer Screening  Covered up to Age 76     Colonoscopy Screen   Covered every 10 years- more often if abnormal Colonoscopy due on 03/16/2023 Update [facility] if applicable    Flex Sigmoidoscopy Screen  Co visit on 12/09/16   • FLU VAC NO PRSV 4 ANNETTA 3 YRS+   Orders placed or performed in visit on 12/11/15   • INFLUENZA VIRUS VACCINE, PRESERV FREE, >=1YEARS OF AGE   Orders placed or performed in visit on 10/27/14   • INFLUENZA VIRUS VACCINE, >=1YEARS OF AGE anyone to review and print using their home computer and printer. (the forms are also available in [address])  [email or web address]  This link also has information from the [address] [place] [address] regarding Advance Directives.

## 2019-02-11 NOTE — PROGRESS NOTES
HPI:   Sachin Aguirre is a 79year old female who presents for a Medicare Initial Preventative Physical Exam (Welcome to Medicare- < 12 months on Medicare).       I also see her for HTN and venous insufficiency on prn lasix and KCL and hypothyroidism  Sh 11/26/2018    HGB 12.6 11/26/2018    .0 11/26/2018        ALLERGIES:   She is allergic to adhesive tape; bacitracin; codeine; doxycycline; levaquin [levofloxacin]; neosporin [neomycin-bacitracin-polymyxin]; nickel; other; pcns [penicillins]; and leyla INFORMATION:   She  has a past medical history of Acquired hypothyroidism (5/18/2017), Breast cancer ([address] (12/4/2014), Cancer ([facility] CKD (chronic kidney disease) stage 3, GFR 30-59 ml/min (HCC) (5/22/2018), Colon polyp, hyperplastic, Colon polyp, hyperpla mother; osteoporosis in her mother. SOCIAL HISTORY:   She  reports that  has never smoked. she has never used smokeless tobacco. She reports that she drinks alcohol. She reports that she does not use drugs.      REVIEW OF SYSTEMS:   GENERAL: feels well ot visit:    (E03.9) Acquired hypothyroidism  (primary encounter diagnosis)  Plan: CPM    (Z00.00) Encounter for annual health examination  Plan:     (Z23) Need for vaccination  Plan: PNEUMOCOCCAL IMM (PNEUMOVAX)            (Z11.59) Need for hepatitis C scree help    Managing money/bills: Able without help    Taking medications as prescribed: Able without help    Are you able to afford your medications?: Yes    Hearing Problems?: No     Functional Status     Hearing Problems?: No    Vision Problems? : No    Dif Glucose (mg/dL)   Date Value   11/26/2018 87     GLUCOSE (mg/dL)   Date Value   11/04/2013 90          Cardiovascular Disease Screening     LDL Annually LDL Cholesterol (mg/dL)   Date Value   08/21/2017 122     LDL CHOLESTROL (mg/dL)   Date Value   11/04/2 factors:   End-stage renal disease   Hemophiliacs who received Factor VIII or IX concentrates   Clients of institutions for the mentally retarded   Persons who live in the same house as a HepB virus carrier   Homosexual men   Illicit injectable drug abuser

## 2019-02-28 ENCOUNTER — OFFICE VISIT (OUTPATIENT)
Dept: INTERNAL MEDICINE CLINIC | Facility: CLINIC | Age: 69
End: 2019-02-28
Payer: MEDICARE

## 2019-02-28 VITALS
HEART RATE: 72 BPM | SYSTOLIC BLOOD PRESSURE: 124 MMHG | DIASTOLIC BLOOD PRESSURE: 74 MMHG | TEMPERATURE: 98 F | BODY MASS INDEX: 35.43 KG/M2 | WEIGHT: 197.5 LBS | OXYGEN SATURATION: 99 % | HEIGHT: 62.5 IN | RESPIRATION RATE: 16 BRPM

## 2019-02-28 DIAGNOSIS — Z01.419 ENCOUNTER FOR GYNECOLOGICAL EXAMINATION WITHOUT ABNORMAL FINDING: Primary | ICD-10-CM

## 2019-02-28 PROCEDURE — G0101 CA SCREEN;PELVIC/BREAST EXAM: HCPCS | Performed by: INTERNAL MEDICINE

## 2019-02-28 NOTE — PROGRESS NOTES
HPI:   Olivia Dick is a 79year old female who presents for a medicare breast and pelvic exam. .    Wt Readings from Last 6 Encounters:  02/28/19 : 197 lb 8 oz  02/11/19 : 199 lb  11/26/18 : 192 lb  10/10/18 : 190 lb  09/18/18 : 190 lb 9.6 oz  05/22/18 1 capsule by mouth daily. Disp:  Rfl:    Ferrous Sulfate 325 (65 FE) MG Oral Tab Take 65 mg by mouth daily. Disp:  Rfl:    Vitamin C (VITAMIN C) 500 MG Oral Tab Take 500 mg by mouth daily.    Disp:  Rfl:              Patient Active Problem List:     LVJIK agrees to the plan. Encounter for gynecological examination without abnormal finding  (primary encounter diagnosis)    No orders of the defined types were placed in this encounter.       Meds & Refills for this Visit:  Requested Prescriptions      No p

## 2019-05-08 NOTE — TELEPHONE ENCOUNTER
Last OV relevant to medication: 2/28/19  Last refill date: 2/11/19     #/refills: 30/0  When pt was asked to return for OV: None noted  Upcoming appt/reason: None

## 2019-05-09 RX ORDER — ESZOPICLONE 1 MG/1
TABLET, FILM COATED ORAL
Qty: 30 TABLET | Refills: 0 | Status: SHIPPED
Start: 2019-05-09 | End: 2019-08-26

## 2019-08-13 ENCOUNTER — TELEPHONE (OUTPATIENT)
Dept: HEMATOLOGY/ONCOLOGY | Facility: HOSPITAL | Age: 69
End: 2019-08-13

## 2019-08-13 RX ORDER — LETROZOLE 2.5 MG/1
TABLET, FILM COATED ORAL
Qty: 90 TABLET | Refills: 0 | OUTPATIENT
Start: 2019-08-13

## 2019-08-13 RX ORDER — LETROZOLE 2.5 MG/1
TABLET, FILM COATED ORAL
Qty: 30 TABLET | Refills: 0 | Status: SHIPPED | OUTPATIENT
Start: 2019-08-13 | End: 2019-09-09

## 2019-08-27 NOTE — TELEPHONE ENCOUNTER
Last OV relevant to medication: 2/11/19   Last refill date: 5/9/19    #/refills: 30/0  When pt was asked to return for OV: none listed, but yearly?   Upcoming appt/reason: none scheduled

## 2019-09-06 NOTE — PROGRESS NOTES
[facility] Progress Note      Patient Name:  Sadi Mcmillan  YOB: 1950  Medical Record Number:  [id number]    Date of visit: 9/9/2019      CHIEF COMPLAINT: Stage I HER-2 positive right breast carcinoma status post bilateral mastec RASH  Gina Driscoll.Stephan*        MEDICATIONS:      Current Outpatient Medications:  ESZOPICLONE 1 MG Oral Tab TAKE ONE TABLET BY MOUTH NIGHTLY AS NEEDED Disp: 30 tablet Rfl: 0   Levothyroxine Sodium 50 MCG Oral Tab TAKE 1 TABLET(50 MCG) BY MOUTH BEFORE BR (1358)  BSA (Calculated - sq m): --  Pulse: 85 (1358)  BP: 123/80 (1358)  Temp: 98.9 °F (37.2 °C) (1358)  Do Not Use - Resp Rate: --  SpO2: 98 % (1358)    PS:  ECO    PHYSICAL EXAM:    General: alert and oriented x 3, not 11.0 - 15.0 %    RDW-SD 47.3 (H) 35.1 - 46.3 fL    Neutrophil Absolute Prelim 3.53 1.50 - 7.70 x10 (3) uL    Neutrophil Absolute 3.53 1.50 - 7.70 x10(3) uL    Lymphocyte Absolute 2.19 1.00 - 4.00 x10(3) uL    Monocyte Absolute 0.55 0.10 - 1.00 x10(3) uL

## 2019-09-09 ENCOUNTER — OFFICE VISIT (OUTPATIENT)
Dept: HEMATOLOGY/ONCOLOGY | Age: 69
End: 2019-09-09
Attending: INTERNAL MEDICINE
Payer: MEDICARE

## 2019-09-09 VITALS
HEART RATE: 85 BPM | BODY MASS INDEX: 36 KG/M2 | RESPIRATION RATE: 20 BRPM | DIASTOLIC BLOOD PRESSURE: 80 MMHG | SYSTOLIC BLOOD PRESSURE: 123 MMHG | WEIGHT: 199.13 LBS | OXYGEN SATURATION: 98 % | TEMPERATURE: 99 F

## 2019-09-09 DIAGNOSIS — T45.1X5A HOT FLASHES RELATED TO AROMATASE INHIBITOR THERAPY: ICD-10-CM

## 2019-09-09 DIAGNOSIS — Z17.0 MALIGNANT NEOPLASM OF CENTRAL PORTION OF RIGHT BREAST IN FEMALE, ESTROGEN RECEPTOR POSITIVE (HCC): Primary | ICD-10-CM

## 2019-09-09 DIAGNOSIS — T50.905D MEDICATION ADVERSE EFFECT, SUBSEQUENT ENCOUNTER: ICD-10-CM

## 2019-09-09 DIAGNOSIS — M85.80 OSTEOPENIA, UNSPECIFIED LOCATION: ICD-10-CM

## 2019-09-09 DIAGNOSIS — Z78.0 POSTMENOPAUSAL: ICD-10-CM

## 2019-09-09 DIAGNOSIS — C50.111 MALIGNANT NEOPLASM OF CENTRAL PORTION OF RIGHT BREAST IN FEMALE, ESTROGEN RECEPTOR POSITIVE (HCC): Primary | ICD-10-CM

## 2019-09-09 DIAGNOSIS — R23.2 HOT FLASHES RELATED TO AROMATASE INHIBITOR THERAPY: ICD-10-CM

## 2019-09-09 DIAGNOSIS — E55.9 VITAMIN D DEFICIENCY: ICD-10-CM

## 2019-09-09 LAB
ALBUMIN SERPL-MCNC: 3.9 G/DL (ref 3.4–5)
ALBUMIN/GLOB SERPL: 1 {RATIO} (ref 1–2)
ALP LIVER SERPL-CCNC: 71 U/L (ref 55–142)
ALT SERPL-CCNC: 34 U/L (ref 13–56)
ANION GAP SERPL CALC-SCNC: 5 MMOL/L (ref 0–18)
AST SERPL-CCNC: 17 U/L (ref 15–37)
BASOPHILS # BLD AUTO: 0.03 X10(3) UL (ref 0–0.2)
BASOPHILS NFR BLD AUTO: 0.5 %
BILIRUB SERPL-MCNC: 0.5 MG/DL (ref 0.1–2)
BUN BLD-MCNC: 22 MG/DL (ref 7–18)
BUN/CREAT SERPL: 19.8 (ref 10–20)
CALCIUM BLD-MCNC: 9.1 MG/DL (ref 8.5–10.1)
CHLORIDE SERPL-SCNC: 104 MMOL/L (ref 98–112)
CO2 SERPL-SCNC: 28 MMOL/L (ref 21–32)
CREAT BLD-MCNC: 1.11 MG/DL (ref 0.55–1.02)
DEPRECATED RDW RBC AUTO: 47.3 FL (ref 35.1–46.3)
EOSINOPHIL # BLD AUTO: 0.14 X10(3) UL (ref 0–0.7)
EOSINOPHIL NFR BLD AUTO: 2.2 %
ERYTHROCYTE [DISTWIDTH] IN BLOOD BY AUTOMATED COUNT: 13.9 % (ref 11–15)
GLOBULIN PLAS-MCNC: 3.8 G/DL (ref 2.8–4.4)
GLUCOSE BLD-MCNC: 98 MG/DL (ref 70–99)
HCT VFR BLD AUTO: 39.8 % (ref 35–48)
HGB BLD-MCNC: 13 G/DL (ref 12–16)
IMM GRANULOCYTES # BLD AUTO: 0.02 X10(3) UL (ref 0–1)
IMM GRANULOCYTES NFR BLD: 0.3 %
LYMPHOCYTES # BLD AUTO: 2.19 X10(3) UL (ref 1–4)
LYMPHOCYTES NFR BLD AUTO: 33.9 %
M PROTEIN MFR SERPL ELPH: 7.7 G/DL (ref 6.4–8.2)
MCH RBC QN AUTO: 30.1 PG (ref 26–34)
MCHC RBC AUTO-ENTMCNC: 32.7 G/DL (ref 31–37)
MCV RBC AUTO: 92.1 FL (ref 80–100)
MONOCYTES # BLD AUTO: 0.55 X10(3) UL (ref 0.1–1)
MONOCYTES NFR BLD AUTO: 8.5 %
NEUTROPHILS # BLD AUTO: 3.53 X10 (3) UL (ref 1.5–7.7)
NEUTROPHILS # BLD AUTO: 3.53 X10(3) UL (ref 1.5–7.7)
NEUTROPHILS NFR BLD AUTO: 54.6 %
OSMOLALITY SERPL CALC.SUM OF ELEC: 287 MOSM/KG (ref 275–295)
PLATELET # BLD AUTO: 246 10(3)UL (ref 150–450)
POTASSIUM SERPL-SCNC: 3.9 MMOL/L (ref 3.5–5.1)
RBC # BLD AUTO: 4.32 X10(6)UL (ref 3.8–5.3)
SODIUM SERPL-SCNC: 137 MMOL/L (ref 136–145)
VIT D+METAB SERPL-MCNC: 37.9 NG/ML (ref 30–100)
WBC # BLD AUTO: 6.5 X10(3) UL (ref 4–11)

## 2019-09-09 PROCEDURE — 99214 OFFICE O/P EST MOD 30 MIN: CPT | Performed by: INTERNAL MEDICINE

## 2019-09-09 NOTE — PROGRESS NOTES
Pt here for MD f/u visit for h/o breast ca. Continues on Letrozole daily. Pt c/o joint pain. Difficulty sleeping/staying asleep. Taking Lunesta PRN. Labs drawn.    Outpatient Oncology Care Plan  Problem list:  knowledge deficit    Problems related to:    [facility]

## 2019-09-18 ENCOUNTER — APPOINTMENT (OUTPATIENT)
Dept: HEMATOLOGY/ONCOLOGY | Age: 69
End: 2019-09-18
Attending: INTERNAL MEDICINE
Payer: MEDICARE

## 2019-09-18 ENCOUNTER — GENETICS ENCOUNTER (OUTPATIENT)
Dept: GENETICS | Age: 69
End: 2019-09-18
Attending: INTERNAL MEDICINE
Payer: MEDICARE

## 2019-09-18 PROCEDURE — 36415 COLL VENOUS BLD VENIPUNCTURE: CPT

## 2019-09-18 NOTE — PROGRESS NOTES
Referring Provider: Ankita Carty MD    Additional Provider: Ashutosh Combs MD    Reason for Referral:  Anni Morrow was referred for genetic counseling because of a personal and family history of breast cancer.   Ms. Shauna Vargas is a 81year-old wom maternal cousins have had skin cancer, not otherwise specified. Ms. Flakito Baldwin paternal aunt is 80years-old and has a cancer of unknown primary. Please see the pedigree for additional family history information. Counseling:    The following information CHEK2.     Genetic Testing (Panel): The pros, cons, and limitations of genetic testing were discussed including the potential implications of test results on clinical management.      If a pathogenic variant is not identified (negative result), it is still entire biological family. Thus, it is recommended that she share her genetic test results with her biological family members so that they may have their risk assessed. Cancer Screening and Prevention Options for BRCA1/2 mutation carriers:   The lifetime Dangelo. Approximately 55 minutes was spent in consultation with Ms. Daniel.

## 2019-09-30 ENCOUNTER — OFFICE VISIT (OUTPATIENT)
Dept: INTERNAL MEDICINE CLINIC | Facility: CLINIC | Age: 69
End: 2019-09-30
Payer: MEDICARE

## 2019-09-30 ENCOUNTER — LAB ENCOUNTER (OUTPATIENT)
Dept: LAB | Age: 69
End: 2019-09-30
Attending: INTERNAL MEDICINE
Payer: MEDICARE

## 2019-09-30 VITALS
WEIGHT: 199 LBS | OXYGEN SATURATION: 98 % | HEIGHT: 62.5 IN | RESPIRATION RATE: 14 BRPM | SYSTOLIC BLOOD PRESSURE: 114 MMHG | BODY MASS INDEX: 35.7 KG/M2 | DIASTOLIC BLOOD PRESSURE: 66 MMHG | HEART RATE: 82 BPM

## 2019-09-30 DIAGNOSIS — I87.2 VENOUS INSUFFICIENCY: ICD-10-CM

## 2019-09-30 DIAGNOSIS — I10 ESSENTIAL HYPERTENSION: ICD-10-CM

## 2019-09-30 DIAGNOSIS — E03.9 ACQUIRED HYPOTHYROIDISM: ICD-10-CM

## 2019-09-30 DIAGNOSIS — E03.9 ACQUIRED HYPOTHYROIDISM: Primary | ICD-10-CM

## 2019-09-30 DIAGNOSIS — N18.30 CKD (CHRONIC KIDNEY DISEASE) STAGE 3, GFR 30-59 ML/MIN (HCC): ICD-10-CM

## 2019-09-30 DIAGNOSIS — F51.01 PRIMARY INSOMNIA: ICD-10-CM

## 2019-09-30 PROCEDURE — 99214 OFFICE O/P EST MOD 30 MIN: CPT | Performed by: INTERNAL MEDICINE

## 2019-09-30 PROCEDURE — 36415 COLL VENOUS BLD VENIPUNCTURE: CPT

## 2019-09-30 PROCEDURE — 84443 ASSAY THYROID STIM HORMONE: CPT

## 2019-09-30 RX ORDER — ESZOPICLONE 1 MG/1
TABLET, FILM COATED ORAL
Qty: 90 TABLET | Refills: 0 | Status: SHIPPED | OUTPATIENT
Start: 2019-09-30 | End: 2020-03-16

## 2019-09-30 RX ORDER — LETROZOLE 2.5 MG/1
TABLET, FILM COATED ORAL
Qty: 30 TABLET | Refills: 3 | Status: SHIPPED | OUTPATIENT
Start: 2019-09-30 | End: 2020-01-23

## 2019-09-30 NOTE — PROGRESS NOTES
Corine Barnard is a 81year old female.  To F/U from last visit regarding HTN, venous insufficiency, and hypothyroidism  HPI:     I also see her for HTN and venous insufficiency on prn lasix and KCL and hypothyroidism and insomnia and CKD 3  She continues mouth daily. Disp:  Rfl:    Pyridoxine HCl (VITAMIN B-6) 100 MG Oral Tab Take 100 mg by mouth daily. Disp:  Rfl:    aspirin 81 MG Oral Tab Take 81 mg by mouth daily. Disp:  Rfl:    vitamin E 400 UNITS Oral Cap Take 400 Units by mouth daily.    Disp:  Rfl: disease    Results for orders placed or performed in visit on 26/74/38   COMP METABOLIC PANEL (14)   Result Value Ref Range    Glucose 98 70 - 99 mg/dL    Sodium 137 136 - 145 mmol/L    Potassium 3.9 3.5 - 5.1 mmol/L    Chloride 104 98 - 112 mmol/L    CO2 possilbe  Essential hypertension- at goal, CPM  Ckd (chronic kidney disease) stage 3, gfr 30-59 ml/min ([facility])- due to age, HTN, lasix, pt reassured, has good BP control, avoid NSAIDS  Venous insufficiency- CPM w/lasix/K prn    No orders of the defined types

## 2019-10-01 ENCOUNTER — IMMUNIZATION (OUTPATIENT)
Dept: INTERNAL MEDICINE CLINIC | Facility: CLINIC | Age: 69
End: 2019-10-01
Payer: MEDICARE

## 2019-10-01 DIAGNOSIS — Z23 NEED FOR VACCINATION: ICD-10-CM

## 2019-10-01 PROCEDURE — G0008 ADMIN INFLUENZA VIRUS VAC: HCPCS | Performed by: INTERNAL MEDICINE

## 2019-10-01 PROCEDURE — 90662 IIV NO PRSV INCREASED AG IM: CPT | Performed by: INTERNAL MEDICINE

## 2019-10-02 NOTE — PROGRESS NOTES
Pt called to report that she has been taking Biotin 5,000mcg. Pt wants to remain on 50mcg Levothyroxine & repeat TSH when she's been off of Biotin. Please advise. See note below as well.

## 2019-10-03 DIAGNOSIS — E03.9 ACQUIRED HYPOTHYROIDISM: Primary | ICD-10-CM

## 2019-10-11 ENCOUNTER — GENETICS ENCOUNTER (OUTPATIENT)
Dept: HEMATOLOGY/ONCOLOGY | Facility: HOSPITAL | Age: 69
End: 2019-10-11

## 2019-10-11 NOTE — PROGRESS NOTES
Referring Provider:       Mary Castillo MD     Additional Provider:     Joselo Aponte MD     Reason for Referral:  Adonay Baird had genetic testing performed on 9/18/19 because of a personal and family history of breast cancer.  Of note, her da meantime, Ms. Daniel and her relatives should speak with their physicians to discuss recommended medical management according to their personal and family history.     Cc:  Sergio Durham

## 2019-10-24 ENCOUNTER — LAB ENCOUNTER (OUTPATIENT)
Dept: LAB | Age: 69
End: 2019-10-24
Attending: INTERNAL MEDICINE
Payer: MEDICARE

## 2019-10-24 DIAGNOSIS — E03.9 ACQUIRED HYPOTHYROIDISM: ICD-10-CM

## 2019-10-24 PROCEDURE — 36415 COLL VENOUS BLD VENIPUNCTURE: CPT

## 2019-10-24 PROCEDURE — 84443 ASSAY THYROID STIM HORMONE: CPT

## 2019-10-28 DIAGNOSIS — E03.9 ACQUIRED HYPOTHYROIDISM: Primary | ICD-10-CM

## 2019-10-28 RX ORDER — LEVOTHYROXINE SODIUM 0.07 MG/1
75 TABLET ORAL
Qty: 90 TABLET | Refills: 1 | Status: SHIPPED | OUTPATIENT
Start: 2019-10-28 | End: 2020-03-10

## 2019-10-28 NOTE — PROGRESS NOTES
Spoke to pt, aware of results & recommendations. Pt voiced understanding & agreeable. Okay to fill Levothyroxine 75 mcg as recommended from 9/30/19 result note. Pending enough to get to next recommended OV in March 2020.

## 2019-12-06 ENCOUNTER — HOSPITAL ENCOUNTER (OUTPATIENT)
Dept: BONE DENSITY | Age: 69
Discharge: HOME | End: 2019-12-06
Attending: INTERNAL MEDICINE
Payer: MEDICARE

## 2019-12-06 DIAGNOSIS — M85.80 OSTEOPENIA, UNSPECIFIED LOCATION: ICD-10-CM

## 2019-12-06 DIAGNOSIS — Z78.0 POSTMENOPAUSAL: ICD-10-CM

## 2019-12-06 PROCEDURE — 77080 DXA BONE DENSITY AXIAL: CPT | Performed by: INTERNAL MEDICINE

## 2019-12-11 ENCOUNTER — OFFICE VISIT (OUTPATIENT)
Dept: SURGERY | Facility: CLINIC | Age: 69
End: 2019-12-11
Payer: MEDICARE

## 2019-12-11 VITALS
TEMPERATURE: 98 F | HEIGHT: 62.5 IN | HEART RATE: 84 BPM | DIASTOLIC BLOOD PRESSURE: 76 MMHG | SYSTOLIC BLOOD PRESSURE: 144 MMHG | BODY MASS INDEX: 35.7 KG/M2 | WEIGHT: 199 LBS

## 2019-12-11 DIAGNOSIS — C50.111 MALIGNANT NEOPLASM OF CENTRAL PORTION OF RIGHT BREAST IN FEMALE, ESTROGEN RECEPTOR POSITIVE (HCC): Primary | ICD-10-CM

## 2019-12-11 DIAGNOSIS — Z17.0 MALIGNANT NEOPLASM OF CENTRAL PORTION OF RIGHT BREAST IN FEMALE, ESTROGEN RECEPTOR POSITIVE (HCC): Primary | ICD-10-CM

## 2019-12-11 PROCEDURE — 99213 OFFICE O/P EST LOW 20 MIN: CPT | Performed by: SURGERY

## 2019-12-11 NOTE — PROGRESS NOTES
Follow Up Visit Note       Active Problems      1.  Malignant neoplasm of central portion of right breast in female, estrogen receptor positive [facility])          Chief Complaint   Patient presents with:  Breast Follow-up: est pt - yearly breast exam. Pt. C/O a supine AHI 22 non-supien AHI 8 SaO2 faith 68 %   • Onychomycosis    • JAMES on CPAP 2/19/2016   • Periorbital swelling     nickel allergy   • Personal history of antineoplastic chemotherapy 7/13/15   • PONV (postoperative nausea and vomiting)     nausea   • • Fuchs' dystrophy Mother    • Other (htn) Mother    • Other (osteoporosis) Mother    • Diabetes Father    • Other (heart issues) Paternal Grandfather    • Diabetes Maternal Grandmother         \"runs on mothers side\"   • Other (Other) Maternal Grandmo TAKES EVERY SECOND DAY ), Disp: 45 tablet, Rfl: 3  •  Magnesium 500 MG Oral Tab, Take 1 tablet by mouth daily. , Disp: , Rfl:   •  letrozole 2.5 MG Oral Tab, Take 1 tablet (2.5 mg total) by mouth once daily. , Disp: 90 tablet, Rfl: 3  •  Cholecalciferol (VIT Cardiovascular: Negative for chest pain, palpitations and leg swelling. Gastrointestinal: Negative for abdominal distention, abdominal pain, anal bleeding, blood in stool, constipation, diarrhea, nausea and vomiting.    Genitourinary: Negative for diffi point and negative Mendes's sign. 3 cm mass in the right upper quadrant Musculoskeletal: Normal range of motion.      Lymphadenopathy:        Head (right side): No submental, no submandibular, no preauricular, no posterior auricular and no occipital a

## 2019-12-12 ENCOUNTER — TELEPHONE (OUTPATIENT)
Dept: HEMATOLOGY/ONCOLOGY | Facility: HOSPITAL | Age: 69
End: 2019-12-12

## 2019-12-12 NOTE — TELEPHONE ENCOUNTER
Spoke with patient. She verbalized understanding and will call and schedule her bone scan. She states her pain is no worse than before, and is generalized. MD updated.     Ronaldo Wallace MD  P Edw Dario Araya Rns             Please call, where is her bone

## 2019-12-16 ENCOUNTER — OFFICE VISIT (OUTPATIENT)
Dept: INTERNAL MEDICINE CLINIC | Facility: CLINIC | Age: 69
End: 2019-12-16
Payer: MEDICARE

## 2019-12-16 ENCOUNTER — HOSPITAL ENCOUNTER (OUTPATIENT)
Dept: GENERAL RADIOLOGY | Age: 69
Discharge: HOME | End: 2019-12-16
Attending: INTERNAL MEDICINE
Payer: MEDICARE

## 2019-12-16 VITALS
DIASTOLIC BLOOD PRESSURE: 78 MMHG | OXYGEN SATURATION: 97 % | HEIGHT: 62.5 IN | RESPIRATION RATE: 16 BRPM | HEART RATE: 95 BPM | BODY MASS INDEX: 36 KG/M2 | SYSTOLIC BLOOD PRESSURE: 128 MMHG

## 2019-12-16 DIAGNOSIS — G89.29 CHRONIC RIGHT HIP PAIN: Primary | ICD-10-CM

## 2019-12-16 DIAGNOSIS — M25.551 CHRONIC RIGHT HIP PAIN: Primary | ICD-10-CM

## 2019-12-16 DIAGNOSIS — M70.61 TROCHANTERIC BURSITIS OF RIGHT HIP: ICD-10-CM

## 2019-12-16 DIAGNOSIS — G89.29 CHRONIC RIGHT HIP PAIN: ICD-10-CM

## 2019-12-16 DIAGNOSIS — M25.551 CHRONIC RIGHT HIP PAIN: ICD-10-CM

## 2019-12-16 PROCEDURE — 99214 OFFICE O/P EST MOD 30 MIN: CPT | Performed by: INTERNAL MEDICINE

## 2019-12-16 PROCEDURE — 73502 X-RAY EXAM HIP UNI 2-3 VIEWS: CPT | Performed by: INTERNAL MEDICINE

## 2019-12-16 NOTE — PROGRESS NOTES
Keith Elaine is a 81year old female  Patient presents with:  Hip Pain: Both sides - Right is worse  Referral: Physical therapy      HPI:   She was diagnosed w/ hip bursitis and did a course of PT last fall and did pretty we-ll but pain was not resolved CHROMIUM OR) Take 1 tablet by mouth 2 (two) times daily. • Cranberry 500 MG Oral Cap Take 1 capsule by mouth 2 (two) times daily. • Ginkgo Biloba (GINKOBA OR) Take 120 mg by mouth 2 (two) times daily.      • Lutein-Zeaxanthin 25-5 MG Oral Cap Ta mechanics are all off due to OA right hip, check xray, repeat PT  No orders of the defined types were placed in this encounter.       Meds & Refills for this Visit:  Requested Prescriptions      No prescriptions requested or ordered in this encounter

## 2020-01-21 ENCOUNTER — HOSPITAL ENCOUNTER (OUTPATIENT)
Dept: NUCLEAR MEDICINE | Facility: HOSPITAL | Age: 70
Discharge: HOME | End: 2020-01-21
Attending: INTERNAL MEDICINE
Payer: MEDICARE

## 2020-01-21 DIAGNOSIS — M89.8X9 BONE PAIN: ICD-10-CM

## 2020-01-21 DIAGNOSIS — Z17.0 MALIGNANT NEOPLASM OF CENTRAL PORTION OF RIGHT BREAST IN FEMALE, ESTROGEN RECEPTOR POSITIVE (HCC): ICD-10-CM

## 2020-01-21 DIAGNOSIS — C50.111 MALIGNANT NEOPLASM OF CENTRAL PORTION OF RIGHT BREAST IN FEMALE, ESTROGEN RECEPTOR POSITIVE (HCC): ICD-10-CM

## 2020-01-21 PROCEDURE — 78306 BONE IMAGING WHOLE BODY: CPT | Performed by: INTERNAL MEDICINE

## 2020-01-23 RX ORDER — LETROZOLE 2.5 MG/1
TABLET, FILM COATED ORAL
Qty: 90 TABLET | Refills: 0 | Status: SHIPPED | OUTPATIENT
Start: 2020-01-23 | End: 2020-03-13

## 2020-02-10 ENCOUNTER — OFFICE VISIT (OUTPATIENT)
Dept: PHYSICAL THERAPY | Age: 70
End: 2020-02-10
Attending: INTERNAL MEDICINE
Payer: MEDICARE

## 2020-02-10 DIAGNOSIS — M70.61 TROCHANTERIC BURSITIS OF RIGHT HIP: ICD-10-CM

## 2020-02-10 PROCEDURE — 97110 THERAPEUTIC EXERCISES: CPT

## 2020-02-10 PROCEDURE — 97163 PT EVAL HIGH COMPLEX 45 MIN: CPT

## 2020-02-10 NOTE — PROGRESS NOTES
LOWER EXTREMITY EVALUATION:   Referring Physician: Dr. Javid Torres  Diagnosis: Trochanteric bursitis of right hip (M70.61)        Date of Service: 2/10/2020     PATIENT SUMMARY   Derian Diaz is a 81year old female who presents to therapy today wi fasciitis. Pt also with hx of spinal stenosis. Pt and PT discussed evaluation findings, pathology, POC and HEP. Pt voiced understanding and performs HEP correctly without reported pain.  Skilled Physical Therapy is medically necessary to address the abov instructed sitting option Clam.     Charges: PT Eval High Complexity, TE x 1      Total Timed Treatment: 15 min     Total Treatment Time: 50    min     Based on clinical rationale and outcome measures, this evaluation involved High Complexity [facility] Date____________________    Certification From: 4/99/4750  To:5/10/2020

## 2020-02-11 ENCOUNTER — APPOINTMENT (OUTPATIENT)
Dept: PHYSICAL THERAPY | Age: 70
End: 2020-02-11
Attending: INTERNAL MEDICINE
Payer: MEDICARE

## 2020-02-12 ENCOUNTER — OFFICE VISIT (OUTPATIENT)
Dept: PHYSICAL THERAPY | Age: 70
End: 2020-02-12
Attending: INTERNAL MEDICINE
Payer: MEDICARE

## 2020-02-12 DIAGNOSIS — M70.61 TROCHANTERIC BURSITIS OF RIGHT HIP: ICD-10-CM

## 2020-02-12 PROCEDURE — 97110 THERAPEUTIC EXERCISES: CPT

## 2020-02-12 PROCEDURE — 97112 NEUROMUSCULAR REEDUCATION: CPT

## 2020-02-12 PROCEDURE — 97140 MANUAL THERAPY 1/> REGIONS: CPT

## 2020-02-18 ENCOUNTER — MED REC SCAN ONLY (OUTPATIENT)
Dept: INTERNAL MEDICINE CLINIC | Facility: CLINIC | Age: 70
End: 2020-02-18

## 2020-02-18 ENCOUNTER — OFFICE VISIT (OUTPATIENT)
Dept: PHYSICAL THERAPY | Age: 70
End: 2020-02-18
Attending: INTERNAL MEDICINE
Payer: MEDICARE

## 2020-02-18 DIAGNOSIS — M70.61 TROCHANTERIC BURSITIS OF RIGHT HIP: ICD-10-CM

## 2020-02-18 PROCEDURE — 97140 MANUAL THERAPY 1/> REGIONS: CPT

## 2020-02-18 PROCEDURE — 97110 THERAPEUTIC EXERCISES: CPT

## 2020-02-18 NOTE — PROGRESS NOTES
Dx: Trochanteric bursitis of right hip (M70.61)            Insurance (Authorized # of Visits):  8           Authorizing Physician: Dr. Priya Whitaker  Next MD visit: none scheduled  Fall Risk: standard         Precautions: tape allergy, cancer, osteoporosi x6  Wt shifts --> SLS trials  Pelvic tilt 5x NR  Wt shifts --> SLS trials      Man R ITB/hip STM, roller Manual STM R ITB and into hip external rotators      CP x8' CP 8'      HEP:  LTR, piriformis stretch, sitting piriformis,  bridge, clam  --> hip abd/si

## 2020-02-20 ENCOUNTER — APPOINTMENT (OUTPATIENT)
Dept: PHYSICAL THERAPY | Age: 70
End: 2020-02-20
Attending: INTERNAL MEDICINE
Payer: MEDICARE

## 2020-02-24 ENCOUNTER — OFFICE VISIT (OUTPATIENT)
Dept: PHYSICAL THERAPY | Age: 70
End: 2020-02-24
Attending: INTERNAL MEDICINE
Payer: MEDICARE

## 2020-02-24 DIAGNOSIS — M70.61 TROCHANTERIC BURSITIS OF RIGHT HIP: ICD-10-CM

## 2020-02-24 PROCEDURE — 97110 THERAPEUTIC EXERCISES: CPT

## 2020-02-24 PROCEDURE — 97112 NEUROMUSCULAR REEDUCATION: CPT

## 2020-02-24 NOTE — PROGRESS NOTES
Dx: Trochanteric bursitis of right hip (M70.61)            Insurance (Authorized # of Visits):  8           Authorizing Physician: Dr. Jose Chopra  Next MD visit: none scheduled  Fall Risk: standard         Precautions: tape allergy, cancer, osteoporosi 10x2 each  HEP review answering pt questions.   -gastroc lunge stretch  -sidelying hip abd 10x2  Bridge 5x  Lumbar rot/piriformis stretch instruction various angles.   Core,    NR  Quad set with mini wall sit, neutral spine  1/4 SB wall sit 15\" x6  Wt shif

## 2020-02-26 ENCOUNTER — APPOINTMENT (OUTPATIENT)
Dept: PHYSICAL THERAPY | Age: 70
End: 2020-02-26
Attending: INTERNAL MEDICINE
Payer: MEDICARE

## 2020-03-02 ENCOUNTER — OFFICE VISIT (OUTPATIENT)
Dept: PHYSICAL THERAPY | Age: 70
End: 2020-03-02
Attending: INTERNAL MEDICINE
Payer: MEDICARE

## 2020-03-02 DIAGNOSIS — M70.61 TROCHANTERIC BURSITIS OF RIGHT HIP: ICD-10-CM

## 2020-03-02 PROCEDURE — 97110 THERAPEUTIC EXERCISES: CPT

## 2020-03-02 PROCEDURE — 97112 NEUROMUSCULAR REEDUCATION: CPT

## 2020-03-02 NOTE — PROGRESS NOTES
Dx: Trochanteric bursitis of right hip (M70.61)            Insurance (Authorized # of Visits):  8           Authorizing Physician: Dr. Jose Chopra  Next MD visit: none scheduled  Fall Risk: standard         Precautions: tape allergy, cancer, osteoporosi piriformis stretch instructed  HEP review answering pt questions.   TE  Seated stepper 6'  Shuttle 37 lbs DL 15x2  SL 25 lbs 10x2 each  HEP review answering pt questions.   -gastroc lunge stretch  -sidelying hip abd 10x2  Bridge 5x  Lumbar rot/piriformis st

## 2020-03-04 ENCOUNTER — APPOINTMENT (OUTPATIENT)
Dept: PHYSICAL THERAPY | Age: 70
End: 2020-03-04
Attending: INTERNAL MEDICINE
Payer: MEDICARE

## 2020-03-09 ENCOUNTER — OFFICE VISIT (OUTPATIENT)
Dept: PHYSICAL THERAPY | Age: 70
End: 2020-03-09
Attending: INTERNAL MEDICINE
Payer: MEDICARE

## 2020-03-09 PROCEDURE — 97140 MANUAL THERAPY 1/> REGIONS: CPT

## 2020-03-09 PROCEDURE — 97110 THERAPEUTIC EXERCISES: CPT

## 2020-03-09 NOTE — PROGRESS NOTES
Dx: Trochanteric bursitis of right hip (M70.61)            Insurance (Authorized # of Visits):  8           Authorizing Physician: Dr. Kartik Hernandez  Next MD visit: none scheduled  Fall Risk: standard         Precautions: tape allergy, cancer, osteoporosi -gastroc lunge stretch  -sidelying hip abd 10x2  Bridge 5x  Lumbar rot/piriformis stretch instruction various angles.     TE  Seated stepper 6'  Shuttle 37 lbs DL 15x2  SL 25 lbs 10x2 each  HEP review answering questions  Passive lumbar rot/  Piriformis s

## 2020-03-10 DIAGNOSIS — E03.9 ACQUIRED HYPOTHYROIDISM: ICD-10-CM

## 2020-03-10 RX ORDER — LEVOTHYROXINE SODIUM 0.07 MG/1
75 TABLET ORAL
Qty: 90 TABLET | Refills: 0 | Status: SHIPPED | OUTPATIENT
Start: 2020-03-10 | End: 2020-06-12

## 2020-03-10 NOTE — TELEPHONE ENCOUNTER
Pt will need refill of Levothyroxine Sodium 75 MCG Oral Tab, will be in [facility]) with sick daughter until May. To [facility] in [place], 90 days please. Pt will reschedule AWV when she gets back.  Thank you

## 2020-03-13 RX ORDER — LETROZOLE 2.5 MG/1
2.5 TABLET, FILM COATED ORAL DAILY
Qty: 90 TABLET | Refills: 0 | Status: SHIPPED | OUTPATIENT
Start: 2020-03-13 | End: 2020-06-12

## 2020-03-16 ENCOUNTER — APPOINTMENT (OUTPATIENT)
Dept: PHYSICAL THERAPY | Age: 70
End: 2020-03-16
Attending: INTERNAL MEDICINE
Payer: MEDICARE

## 2020-03-16 ENCOUNTER — APPOINTMENT (OUTPATIENT)
Dept: HEMATOLOGY/ONCOLOGY | Age: 70
End: 2020-03-16
Attending: INTERNAL MEDICINE
Payer: MEDICARE

## 2020-03-16 NOTE — TELEPHONE ENCOUNTER
Last OV relevant to medication: 9/30/19  Last refill date: 9/30/19     #/refills: 90/0  When pt was asked to return for OV: 6 months, for AWV  Upcoming appt/reason: none    Pt due around 3/30/2020 for AWV, d/t COVID-19 situation, postponing.   Pending #30 o

## 2020-03-17 ENCOUNTER — TELEPHONE (OUTPATIENT)
Dept: PHYSICAL THERAPY | Age: 70
End: 2020-03-17

## 2020-03-17 NOTE — TELEPHONE ENCOUNTER
Called and LM to follow up regarding future Physical Therapy appts. Canceled appts in March due to pt caring for family member out of town. Also to hold PT through March due to public health concern. Pt with 4 visits remaining if needed.

## 2020-04-17 ENCOUNTER — TELEPHONE (OUTPATIENT)
Dept: PHYSICAL THERAPY | Age: 70
End: 2020-04-17

## 2020-04-17 NOTE — TELEPHONE ENCOUNTER
Called pt and LMTCB regarding future Physical Therapy visits of status of symptoms. Plan to discharge PT if no further contact with this office. Pt to call if she would like to complete remaining visits.   Medicare certification is through 5/10/20

## 2020-06-11 DIAGNOSIS — E03.9 ACQUIRED HYPOTHYROIDISM: ICD-10-CM

## 2020-06-11 DIAGNOSIS — I10 ESSENTIAL HYPERTENSION: Primary | ICD-10-CM

## 2020-06-12 RX ORDER — LETROZOLE 2.5 MG/1
TABLET, FILM COATED ORAL
Qty: 90 TABLET | Refills: 0 | Status: SHIPPED | OUTPATIENT
Start: 2020-06-12 | End: 2020-11-05

## 2020-06-12 RX ORDER — LISINOPRIL 10 MG/1
TABLET ORAL
Qty: 90 TABLET | Refills: 3 | Status: SHIPPED | OUTPATIENT
Start: 2020-06-12 | End: 2021-05-28

## 2020-06-12 RX ORDER — LETROZOLE 2.5 MG/1
TABLET, FILM COATED ORAL
Qty: 30 TABLET | Refills: 0 | Status: SHIPPED | OUTPATIENT
Start: 2020-06-12 | End: 2020-06-12

## 2020-06-12 RX ORDER — FUROSEMIDE 40 MG/1
TABLET ORAL
Qty: 45 TABLET | Refills: 3 | Status: SHIPPED | OUTPATIENT
Start: 2020-06-12 | End: 2021-05-28

## 2020-06-12 RX ORDER — POTASSIUM CHLORIDE 20 MEQ/1
TABLET, EXTENDED RELEASE ORAL
Qty: 45 TABLET | Refills: 3 | Status: SHIPPED | OUTPATIENT
Start: 2020-06-12 | End: 2021-05-28

## 2020-06-12 RX ORDER — LEVOTHYROXINE SODIUM 0.07 MG/1
TABLET ORAL
Qty: 90 TABLET | Refills: 0 | Status: SHIPPED | OUTPATIENT
Start: 2020-06-12 | End: 2020-09-17

## 2020-09-08 PROBLEM — C44.612 BASAL CELL CARCINOMA (BCC) OF RIGHT UPPER ARM: Status: ACTIVE | Noted: 2020-01-20

## 2020-09-16 ENCOUNTER — TELEPHONE (OUTPATIENT)
Dept: INTERNAL MEDICINE CLINIC | Facility: CLINIC | Age: 70
End: 2020-09-16

## 2020-09-16 DIAGNOSIS — E03.9 ACQUIRED HYPOTHYROIDISM: ICD-10-CM

## 2020-09-16 NOTE — TELEPHONE ENCOUNTER
Sent message to patient to schedule an appointment to release refill request      Last OV relevant to medication: 9/30/2019  Last refill date:6/12/2020     #/refills: 90-0  When pt was asked to return for OV: 6 month, AWV  Upcoming appt/reason: No upcoming scheduled appointment    Free T4 (ng/dL)   Date Value   11/26/2018 1.1     FREE T4 (ng/dL)   Date Value   11/04/2013 0.9     TSH (mIU/mL)   Date Value   10/24/2019 3.400   11/04/2013 5.400

## 2020-09-17 DIAGNOSIS — E03.9 ACQUIRED HYPOTHYROIDISM: ICD-10-CM

## 2020-09-17 RX ORDER — LEVOTHYROXINE SODIUM 0.07 MG/1
TABLET ORAL
Qty: 90 TABLET | Refills: 0 | OUTPATIENT
Start: 2020-09-17

## 2020-09-17 RX ORDER — LEVOTHYROXINE SODIUM 0.07 MG/1
TABLET ORAL
Qty: 30 TABLET | Refills: 0 | Status: SHIPPED | OUTPATIENT
Start: 2020-09-17 | End: 2020-11-05

## 2020-09-28 ENCOUNTER — MED REC SCAN ONLY (OUTPATIENT)
Dept: INTERNAL MEDICINE CLINIC | Facility: CLINIC | Age: 70
End: 2020-09-28

## 2020-10-11 NOTE — PROGRESS NOTES
[facility] Progress Note      Patient Name:  Hue Herndon  YOB: 1950  Medical Record Number:  [id number]    Date of visit: 10/12/2020      CHIEF COMPLAINT: Stage I HER-2+ R breast ca s/p evert mastectomy and FEMI flap reconstructi ITCHING  Other                   RASH, ITCHING    Comment:Dermabond  Pcns [Penicillins]      RASH, ITCHING  Polysporin [Bacitra*    RASH, ITCHING  Soap                    RASH, ITCHING    Comment:gojo     MEDICATIONS:    Current Outpatient Medicatio 1419)  Weight: 90.4 kg (199 lb 3.2 oz) (10/12 1419)  BSA (Calculated - sq m): 1.91 sq meters (10/12 1419)  Pulse: 87 (10/12 1419)  BP: 128/77 (10/12 1419)  Temp: 96.9 °F (36.1 °C) (10/12 1419)  Do Not Use - Resp Rate: --  SpO2: 98 % (10/12 1419)    PS:  EC - 100.0 fL    MCH 29.9 26.0 - 34.0 pg    MCHC 32.4 31.0 - 37.0 g/dL    RDW 13.7 11.0 - 15.0 %    RDW-SD 46.5 (H) 35.1 - 46.3 fL    Neutrophil Absolute Prelim 4.44 1.50 - 7.70 x10 (3) uL    Neutrophil Absolute 4.44 1.50 - 7.70 x10(3) uL    Lymphocyte Absolu [address], [place], [id number]    10/12/2020

## 2020-10-12 ENCOUNTER — OFFICE VISIT (OUTPATIENT)
Dept: HEMATOLOGY/ONCOLOGY | Age: 70
End: 2020-10-12
Attending: INTERNAL MEDICINE
Payer: MEDICARE

## 2020-10-12 VITALS
HEART RATE: 87 BPM | DIASTOLIC BLOOD PRESSURE: 77 MMHG | OXYGEN SATURATION: 98 % | HEIGHT: 62.01 IN | WEIGHT: 199.19 LBS | SYSTOLIC BLOOD PRESSURE: 128 MMHG | BODY MASS INDEX: 36.19 KG/M2 | TEMPERATURE: 97 F | RESPIRATION RATE: 20 BRPM

## 2020-10-12 DIAGNOSIS — C50.111 MALIGNANT NEOPLASM OF CENTRAL PORTION OF RIGHT BREAST IN FEMALE, ESTROGEN RECEPTOR POSITIVE (HCC): Primary | ICD-10-CM

## 2020-10-12 DIAGNOSIS — Z17.0 MALIGNANT NEOPLASM OF CENTRAL PORTION OF RIGHT BREAST IN FEMALE, ESTROGEN RECEPTOR POSITIVE (HCC): Primary | ICD-10-CM

## 2020-10-12 DIAGNOSIS — E55.9 VITAMIN D DEFICIENCY: ICD-10-CM

## 2020-10-12 DIAGNOSIS — M85.80 OSTEOPENIA, UNSPECIFIED LOCATION: ICD-10-CM

## 2020-10-12 DIAGNOSIS — Z78.0 POSTMENOPAUSAL: ICD-10-CM

## 2020-10-12 PROCEDURE — 99213 OFFICE O/P EST LOW 20 MIN: CPT | Performed by: INTERNAL MEDICINE

## 2020-10-12 NOTE — PROGRESS NOTES
Pt here for MD f/u visit for h/o BRCA. Continues on Letrozole daily. Had bone scan done in January. Labs drawn today.    Education Record    Learner:  Patient    Disease / Diagnosis:BRCA    Barriers / Limitations:  None   Comments:    Method:  Brief focused

## 2020-11-05 ENCOUNTER — OFFICE VISIT (OUTPATIENT)
Dept: INTERNAL MEDICINE CLINIC | Facility: CLINIC | Age: 70
End: 2020-11-05
Payer: MEDICARE

## 2020-11-05 VITALS
SYSTOLIC BLOOD PRESSURE: 124 MMHG | WEIGHT: 198.88 LBS | HEIGHT: 62.01 IN | OXYGEN SATURATION: 98 % | DIASTOLIC BLOOD PRESSURE: 76 MMHG | HEART RATE: 74 BPM | RESPIRATION RATE: 16 BRPM | BODY MASS INDEX: 36.14 KG/M2 | TEMPERATURE: 98 F

## 2020-11-05 DIAGNOSIS — I87.2 VENOUS INSUFFICIENCY: ICD-10-CM

## 2020-11-05 DIAGNOSIS — Z85.89 HISTORY OF SQUAMOUS CELL CARCINOMA: ICD-10-CM

## 2020-11-05 DIAGNOSIS — Z00.00 ENCOUNTER FOR ANNUAL HEALTH EXAMINATION: Primary | ICD-10-CM

## 2020-11-05 DIAGNOSIS — Z85.828 HISTORY OF BASAL CELL CANCER: ICD-10-CM

## 2020-11-05 DIAGNOSIS — Z23 NEED FOR VACCINATION: ICD-10-CM

## 2020-11-05 DIAGNOSIS — C50.111 MALIGNANT NEOPLASM OF CENTRAL PORTION OF RIGHT BREAST IN FEMALE, ESTROGEN RECEPTOR POSITIVE (HCC): ICD-10-CM

## 2020-11-05 DIAGNOSIS — E03.9 ACQUIRED HYPOTHYROIDISM: ICD-10-CM

## 2020-11-05 DIAGNOSIS — N18.31 STAGE 3A CHRONIC KIDNEY DISEASE (HCC): ICD-10-CM

## 2020-11-05 DIAGNOSIS — Z17.0 MALIGNANT NEOPLASM OF CENTRAL PORTION OF RIGHT BREAST IN FEMALE, ESTROGEN RECEPTOR POSITIVE (HCC): ICD-10-CM

## 2020-11-05 DIAGNOSIS — I10 ESSENTIAL HYPERTENSION: ICD-10-CM

## 2020-11-05 PROBLEM — C44.612 BASAL CELL CARCINOMA (BCC) OF RIGHT UPPER ARM: Status: RESOLVED | Noted: 2020-01-20 | Resolved: 2020-11-05

## 2020-11-05 PROCEDURE — 99213 OFFICE O/P EST LOW 20 MIN: CPT | Performed by: INTERNAL MEDICINE

## 2020-11-05 PROCEDURE — G0008 ADMIN INFLUENZA VIRUS VAC: HCPCS | Performed by: INTERNAL MEDICINE

## 2020-11-05 PROCEDURE — 90662 IIV NO PRSV INCREASED AG IM: CPT | Performed by: INTERNAL MEDICINE

## 2020-11-05 PROCEDURE — G0439 PPPS, SUBSEQ VISIT: HCPCS | Performed by: INTERNAL MEDICINE

## 2020-11-05 RX ORDER — LEVOTHYROXINE SODIUM 0.07 MG/1
75 TABLET ORAL
Qty: 90 TABLET | Refills: 0 | Status: SHIPPED | OUTPATIENT
Start: 2020-11-05 | End: 2021-02-16

## 2020-11-05 NOTE — PATIENT INSTRUCTIONS
Vianney Daniel's SCREENING SCHEDULE   Tests on this list are recommended by your physician but may not be covered, or covered at this frequency, by your insurer. Please check with your insurance carrier before scheduling to verify coverage.    PREVENTATI are 73-68 years old and have smoked more than 100 cigarettes in their lifetime   • Anyone with a family history    Colorectal Cancer Screening  Covered up to Age 76     Colonoscopy Screen   Covered every 10 years- more often if abnormal Colonoscopy due on Orders placed or performed in visit on 10/01/19   • FLU VACC HIGH DOSE PRSV FREE   Orders placed or performed in visit on 12/09/16   • FLU VAC NO PRSV 4 ANNETTA 3 YRS+   Orders placed or performed in visit on 12/11/15   • INFLUENZA VIRUS VACCINE, PRESERV FREE, also has the State forms available on it's website for anyone to review and print using their home computer and printer. (the forms are also available in [address])  [email or web address] org  This link also has information from the [address]

## 2020-11-05 NOTE — PROGRESS NOTES
HPI:   Nu Moreno is a 78year old female who presents for a medicare initial AWV      I also see her for HTN and venous insufficiency on prn lasix and KCL , and hypothyroidism and occ insomnia  He sleep is troublesome, she does some pre-hs exercise 08/21/2017    HDL 59 08/21/2017     08/21/2017    TRIG 123 08/21/2017          Last Chemistry Labs:   Lab Results   Component Value Date    AST 19 10/12/2020    ALT 36 10/12/2020    CA 9.3 10/12/2020    ALB 3.7 10/12/2020    TSH 3.400 10/24/2019 colonoscopy (age 56 yrs); other (Bilateral, 2/23/15);  Breast reconstruction (Bilateral, 2/23/15); ; skin surgery; tonsillectomy; other surgical history (03/16/15); other surgical history (2015); other surgical history (2018); colonoscopy; and c Diagnoses and all orders for this visit:      (Z00.00) Encounter for annual health examination  (primary encounter diagnosis)  Plan:  preventive services schedule provided to pt      (I10) Essential hypertension  Plan: at goal, CPM, labs UTD    (N18.31) Influenza, Hepatitis B, Tetanus, or Pneumococcal?: (P) No     Functional Ability     Bathing or Showering: (P) Able without help    Toileting: (P) Able without help    Dressing: (P) Able without help    Eating: (P) Able without help    Driving: (P) Able wi 03/16/2023 Update Health Maintenance if applicable    Flex Sigmoidoscopy Screen every 10 years No results found for this or any previous visit. No flowsheet data found. Fecal Occult Blood Annually No results found for: FOB No flowsheet data found.     G needed    Zoster  Not covered by Medicare Part B No vaccine history found This may be covered with your pharmacy  prescription benefits        [address] [facility] Internal Lab or Procedure External Lab or Procedure   Annual Monitoring of Persistent

## 2020-11-16 ENCOUNTER — LAB ENCOUNTER (OUTPATIENT)
Dept: LAB | Age: 70
End: 2020-11-16
Attending: INTERNAL MEDICINE
Payer: MEDICARE

## 2020-11-16 DIAGNOSIS — E03.9 ACQUIRED HYPOTHYROIDISM: ICD-10-CM

## 2020-11-16 PROCEDURE — 84443 ASSAY THYROID STIM HORMONE: CPT

## 2020-11-16 PROCEDURE — 36415 COLL VENOUS BLD VENIPUNCTURE: CPT

## 2021-01-20 DIAGNOSIS — F51.01 PRIMARY INSOMNIA: ICD-10-CM

## 2021-01-22 RX ORDER — ESZOPICLONE 1 MG/1
TABLET, FILM COATED ORAL
Qty: 30 TABLET | Refills: 0 | Status: SHIPPED | OUTPATIENT
Start: 2021-01-22 | End: 2021-03-25

## 2021-01-22 NOTE — TELEPHONE ENCOUNTER
Last OV relevant to medication: 11/5/2020, AWV  Last refill date: 3/16/2020     #/refills: 30-0  When pt was asked to return for OV: None listed  Upcoming appt/reason: None scheduled

## 2021-01-27 ENCOUNTER — OFFICE VISIT (OUTPATIENT)
Dept: SURGERY | Facility: CLINIC | Age: 71
End: 2021-01-27
Payer: MEDICARE

## 2021-01-27 VITALS
SYSTOLIC BLOOD PRESSURE: 110 MMHG | WEIGHT: 201.63 LBS | DIASTOLIC BLOOD PRESSURE: 82 MMHG | HEIGHT: 63 IN | TEMPERATURE: 97 F | BODY MASS INDEX: 35.73 KG/M2 | HEART RATE: 90 BPM

## 2021-01-27 DIAGNOSIS — Z17.0 MALIGNANT NEOPLASM OF CENTRAL PORTION OF RIGHT BREAST IN FEMALE, ESTROGEN RECEPTOR POSITIVE (HCC): ICD-10-CM

## 2021-01-27 DIAGNOSIS — N63.10 MASSES OF BOTH BREASTS: Primary | ICD-10-CM

## 2021-01-27 DIAGNOSIS — C50.111 MALIGNANT NEOPLASM OF CENTRAL PORTION OF RIGHT BREAST IN FEMALE, ESTROGEN RECEPTOR POSITIVE (HCC): ICD-10-CM

## 2021-01-27 DIAGNOSIS — N63.20 MASSES OF BOTH BREASTS: Primary | ICD-10-CM

## 2021-01-27 PROCEDURE — 99213 OFFICE O/P EST LOW 20 MIN: CPT | Performed by: SURGERY

## 2021-01-27 NOTE — PROGRESS NOTES
Follow Up Visit Note       Active Problems      1. Masses of both breasts    2.  Malignant neoplasm of central portion of right breast in female, estrogen receptor positive [facility])          Chief Complaint   Patient presents with:  Breast Problem: Breast - Fo ml/min 5/22/2018   • Colon polyp, hyperplastic    • Colon polyp, hyperplastic    • Essential hypertension    • High blood pressure    • History of adenomatous polyp of colon 3/21/2018   • History of squamous cell carcinoma 11/5/2020    Left cheek, January cheek- actinic keratosis   • SHOULDER SURG PROC UNLISTED Left 1990    removal of benign lump   • SKIN SURGERY     • TONSILLECTOMY     • US BREAST BIOPSY Right 12/10/14       The family history and social history have been reviewed by me today.     Family Hi Disp: 90 tablet, Rfl: 3  •  POTASSIUM CHLORIDE ER 20 MEQ Oral Tab CR, TAKE 1 TABLET BY MOUTH EVERY OTHER DAY WITH FUROSEMIDE, Disp: 45 tablet, Rfl: 3  •  Magnesium 500 MG Oral Tab, Take 1 tablet by mouth daily. , Disp: , Rfl:   •  Cholecalciferol (VITAMIN D diarrhea, nausea and vomiting. Genitourinary: Negative for difficulty urinating, dysuria, frequency and urgency. Musculoskeletal: Negative for arthralgias and myalgias. Skin: Negative for color change and rash.    Neurological: Negative for tremors, s submandibular, no preauricular, no posterior auricular and no occipital adenopathy present. Right cervical: No superficial cervical, no deep cervical and no posterior cervical adenopathy present.        Left cervical: No superficial cervical, no deep

## 2021-02-03 DIAGNOSIS — Z23 NEED FOR VACCINATION: ICD-10-CM

## 2021-02-05 ENCOUNTER — HOSPITAL ENCOUNTER (OUTPATIENT)
Dept: ULTRASOUND IMAGING | Age: 71
Discharge: HOME | End: 2021-02-05
Attending: SURGERY
Payer: MEDICARE

## 2021-02-05 DIAGNOSIS — N63.10 MASSES OF BOTH BREASTS: ICD-10-CM

## 2021-02-05 DIAGNOSIS — Z17.0 MALIGNANT NEOPLASM OF CENTRAL PORTION OF RIGHT BREAST IN FEMALE, ESTROGEN RECEPTOR POSITIVE (HCC): ICD-10-CM

## 2021-02-05 DIAGNOSIS — N63.20 MASSES OF BOTH BREASTS: ICD-10-CM

## 2021-02-05 DIAGNOSIS — C50.111 MALIGNANT NEOPLASM OF CENTRAL PORTION OF RIGHT BREAST IN FEMALE, ESTROGEN RECEPTOR POSITIVE (HCC): ICD-10-CM

## 2021-02-05 PROCEDURE — 76641 ULTRASOUND BREAST COMPLETE: CPT | Performed by: SURGERY

## 2021-02-07 ENCOUNTER — IMMUNIZATION (OUTPATIENT)
Dept: LAB | Age: 71
End: 2021-02-07
Attending: HOSPITALIST
Payer: MEDICARE

## 2021-02-07 DIAGNOSIS — Z23 NEED FOR VACCINATION: Primary | ICD-10-CM

## 2021-02-07 PROCEDURE — 0001A SARSCOV2 VAC 30MCG/0.3ML IM: CPT

## 2021-02-08 ENCOUNTER — TELEPHONE (OUTPATIENT)
Dept: SURGERY | Facility: CLINIC | Age: 71
End: 2021-02-08

## 2021-02-08 DIAGNOSIS — Z85.3 HX OF BREAST CANCER: ICD-10-CM

## 2021-02-08 DIAGNOSIS — N63.20 MASSES OF BOTH BREASTS: Primary | ICD-10-CM

## 2021-02-08 DIAGNOSIS — N63.10 MASSES OF BOTH BREASTS: Primary | ICD-10-CM

## 2021-02-08 NOTE — TELEPHONE ENCOUNTER
----- Message from Parker Jean Baptiste MD sent at 2/8/2021 10:08 AM CST -----  Please place in the recall system for a bilateral breast US in 1 year (dx:  breast masses and h/o breast cancer).   Also she should follow up after the [address] [facility] for a breast exam.    T

## 2021-02-16 DIAGNOSIS — E03.9 ACQUIRED HYPOTHYROIDISM: ICD-10-CM

## 2021-02-16 RX ORDER — LEVOTHYROXINE SODIUM 0.07 MG/1
75 TABLET ORAL
Qty: 90 TABLET | Refills: 0 | Status: SHIPPED | OUTPATIENT
Start: 2021-02-16 | End: 2021-05-28

## 2021-02-28 ENCOUNTER — IMMUNIZATION (OUTPATIENT)
Dept: LAB | Age: 71
End: 2021-02-28
Attending: HOSPITALIST
Payer: MEDICARE

## 2021-02-28 DIAGNOSIS — Z23 NEED FOR VACCINATION: Primary | ICD-10-CM

## 2021-02-28 PROCEDURE — 0002A SARSCOV2 VAC 30MCG/0.3ML IM: CPT

## 2021-03-21 ENCOUNTER — HOSPITAL ENCOUNTER (OUTPATIENT)
Dept: GENERAL RADIOLOGY | Age: 71
Discharge: HOME | End: 2021-03-21
Attending: CHIROPRACTOR
Payer: MEDICARE

## 2021-03-21 DIAGNOSIS — S22.39XA RIB FRACTURE: ICD-10-CM

## 2021-03-21 PROCEDURE — 72100 X-RAY EXAM L-S SPINE 2/3 VWS: CPT | Performed by: CHIROPRACTOR

## 2021-03-21 PROCEDURE — 72072 X-RAY EXAM THORAC SPINE 3VWS: CPT | Performed by: CHIROPRACTOR

## 2021-03-21 PROCEDURE — 71101 X-RAY EXAM UNILAT RIBS/CHEST: CPT | Performed by: CHIROPRACTOR

## 2021-03-25 DIAGNOSIS — F51.01 PRIMARY INSOMNIA: ICD-10-CM

## 2021-03-25 RX ORDER — ESZOPICLONE 1 MG/1
TABLET, FILM COATED ORAL
Qty: 30 TABLET | Refills: 0 | Status: SHIPPED | OUTPATIENT
Start: 2021-03-25 | End: 2021-05-28

## 2021-03-25 NOTE — TELEPHONE ENCOUNTER
Last OV relevant to medication: 11/5/2020  Last refill date: 1/22/21     #/refills: 30-0  When pt was asked to return for OV: none listed  Upcoming appt/reason: none  Was pt informed of any over due labs: no

## 2021-05-26 DIAGNOSIS — E03.9 ACQUIRED HYPOTHYROIDISM: ICD-10-CM

## 2021-05-26 DIAGNOSIS — I10 ESSENTIAL HYPERTENSION: ICD-10-CM

## 2021-05-26 DIAGNOSIS — F51.01 PRIMARY INSOMNIA: ICD-10-CM

## 2021-05-26 RX ORDER — LEVOTHYROXINE SODIUM 0.07 MG/1
75 TABLET ORAL
Qty: 90 TABLET | Refills: 0 | Status: CANCELLED | OUTPATIENT
Start: 2021-05-26

## 2021-05-26 NOTE — TELEPHONE ENCOUNTER
Patient is frustrated because she is not getting a year of refills when she sees Dr Landon Garrison each November. She says she sees her once a year and does not understand why she can't get a year of refills.   She is also frustrated because the 5 medications she t

## 2021-05-28 RX ORDER — LISINOPRIL 10 MG/1
10 TABLET ORAL DAILY
Qty: 90 TABLET | Refills: 1 | Status: SHIPPED | OUTPATIENT
Start: 2021-05-28 | End: 2021-12-02

## 2021-05-28 RX ORDER — FUROSEMIDE 40 MG/1
40 TABLET ORAL EVERY OTHER DAY
Qty: 45 TABLET | Refills: 1 | Status: SHIPPED | OUTPATIENT
Start: 2021-05-28 | End: 2021-12-02

## 2021-05-28 RX ORDER — POTASSIUM CHLORIDE 20 MEQ/1
20 TABLET, EXTENDED RELEASE ORAL EVERY OTHER DAY
Qty: 45 TABLET | Refills: 1 | Status: SHIPPED | OUTPATIENT
Start: 2021-05-28 | End: 2021-12-02

## 2021-05-28 RX ORDER — LEVOTHYROXINE SODIUM 0.07 MG/1
75 TABLET ORAL
Qty: 90 TABLET | Refills: 1 | Status: SHIPPED | OUTPATIENT
Start: 2021-05-28 | End: 2021-12-02

## 2021-05-28 RX ORDER — ESZOPICLONE 1 MG/1
TABLET, FILM COATED ORAL
Qty: 30 TABLET | Refills: 0 | Status: SHIPPED | OUTPATIENT
Start: 2021-05-28 | End: 2021-09-24

## 2021-05-28 NOTE — TELEPHONE ENCOUNTER
Last OV relevant to medication: 11/5/2020  Last refill date: 3/25/21     #/refills: 30/0 (eszopiclone)  Last refill date: 6/12/2020  #/refills: 90/3  When pt was asked to return for OV: none noted; per HM, due   Upcoming appt/reason: none  Was pt informed

## 2021-06-22 ENCOUNTER — TELEPHONE (OUTPATIENT)
Dept: INTERNAL MEDICINE CLINIC | Facility: CLINIC | Age: 71
End: 2021-06-22

## 2021-06-22 NOTE — TELEPHONE ENCOUNTER
Spoke with Gayathri Ludwig at [facility] they never received refills on Lisinopril and Furosimide on 5/28/21  Verbal authorization given  Lisinopril 90 tabs with 1 refill  Lasix 45 tabs with 1 refill

## 2021-09-23 DIAGNOSIS — F51.01 PRIMARY INSOMNIA: ICD-10-CM

## 2021-09-24 RX ORDER — ESZOPICLONE 1 MG/1
TABLET, FILM COATED ORAL
Qty: 30 TABLET | Refills: 0 | OUTPATIENT
Start: 2021-09-24

## 2021-09-24 RX ORDER — ESZOPICLONE 1 MG/1
TABLET, FILM COATED ORAL
Qty: 30 TABLET | Refills: 0 | Status: SHIPPED | OUTPATIENT
Start: 2021-09-24 | End: 2021-12-02

## 2021-09-24 NOTE — TELEPHONE ENCOUNTER
Last OV relevant to medication: 11/5/2020  Last refill date: 5/28/21     #/refills: 30/0  When pt was asked to return for OV: none noted; per JACQUELYN BEDOLLA due 11/5/21  Upcoming appt/reason: none  Was pt informed of any over due labs: none

## 2021-10-17 NOTE — PROGRESS NOTES
[facility] Progress Note      Patient Name:  Savana Louie  YOB: 1950  Medical Record Number:  [id number]    Date of visit: 10/18/2021      CHIEF COMPLAINT: Stage I HER-2+ R breast ca s/p evert mastectomy and FEMI flap reconstructi RASH, ITCHING  Other                   RASH, ITCHING    Comment:Dermabond  Pcns [Penicillins]      RASH, ITCHING  Polysporin [Bacitra*    RASH, ITCHING  Soap                    RASH, ITCHING    Comment:gojo     MEDICATIONS:    Current Outpatient Med - sq m): 1.91 sq meters (10/18 1357)  Pulse: 67 (10/18 1357)  BP: 126/80 (10/18 1357)  Temp: 97.7 °F (36.5 °C) (10/18 1357)  Do Not Use - Resp Rate: --  SpO2: 98 % (10/18 1357)    PS:  ECO    PHYSICAL EXAM:    General: alert and oriented x 3, not in ac 3. 98 1.50 - 7.70 x10 (3) uL    Neutrophil Absolute 3.98 1.50 - 7.70 x10(3) uL    Lymphocyte Absolute 2.33 1.00 - 4.00 x10(3) uL    Monocyte Absolute 0.58 0.10 - 1.00 x10(3) uL    Eosinophil Absolute 0.29 0.00 - 0.70 x10(3) uL    Basophil Absolute 0.05 0.00

## 2021-10-18 ENCOUNTER — OFFICE VISIT (OUTPATIENT)
Dept: HEMATOLOGY/ONCOLOGY | Age: 71
End: 2021-10-18
Attending: INTERNAL MEDICINE
Payer: MEDICARE

## 2021-10-18 VITALS
DIASTOLIC BLOOD PRESSURE: 80 MMHG | BODY MASS INDEX: 36.45 KG/M2 | RESPIRATION RATE: 20 BRPM | SYSTOLIC BLOOD PRESSURE: 126 MMHG | HEIGHT: 62.01 IN | OXYGEN SATURATION: 98 % | TEMPERATURE: 98 F | HEART RATE: 67 BPM | WEIGHT: 200.63 LBS

## 2021-10-18 DIAGNOSIS — Z79.811 LONG TERM (CURRENT) USE OF AROMATASE INHIBITORS: ICD-10-CM

## 2021-10-18 DIAGNOSIS — E55.9 VITAMIN D DEFICIENCY: ICD-10-CM

## 2021-10-18 DIAGNOSIS — C50.111 MALIGNANT NEOPLASM OF CENTRAL PORTION OF RIGHT BREAST IN FEMALE, ESTROGEN RECEPTOR POSITIVE (HCC): ICD-10-CM

## 2021-10-18 DIAGNOSIS — R51.9 NONINTRACTABLE HEADACHE, UNSPECIFIED CHRONICITY PATTERN, UNSPECIFIED HEADACHE TYPE: Primary | ICD-10-CM

## 2021-10-18 DIAGNOSIS — M85.80 OSTEOPENIA, UNSPECIFIED LOCATION: ICD-10-CM

## 2021-10-18 DIAGNOSIS — Z17.0 MALIGNANT NEOPLASM OF CENTRAL PORTION OF RIGHT BREAST IN FEMALE, ESTROGEN RECEPTOR POSITIVE (HCC): ICD-10-CM

## 2021-10-18 PROCEDURE — 99214 OFFICE O/P EST MOD 30 MIN: CPT | Performed by: INTERNAL MEDICINE

## 2021-10-18 NOTE — PROGRESS NOTES
Education Record    Learner:  Patient    Disease / Larina Patee visit.        Barriers / Limitations:  None   Comments:    Method:  Discussion   Comments:    General Topics:  Plan of care reviewed   Comments:    Outcome:  Shows understanding   Comments:

## 2021-11-08 ENCOUNTER — HOSPITAL ENCOUNTER (OUTPATIENT)
Dept: MRI IMAGING | Age: 71
Discharge: HOME | End: 2021-11-08
Attending: INTERNAL MEDICINE
Payer: MEDICARE

## 2021-11-08 DIAGNOSIS — C50.111 MALIGNANT NEOPLASM OF CENTRAL PORTION OF RIGHT BREAST IN FEMALE, ESTROGEN RECEPTOR POSITIVE (HCC): ICD-10-CM

## 2021-11-08 DIAGNOSIS — Z17.0 MALIGNANT NEOPLASM OF CENTRAL PORTION OF RIGHT BREAST IN FEMALE, ESTROGEN RECEPTOR POSITIVE (HCC): ICD-10-CM

## 2021-11-08 PROCEDURE — A9575 INJ GADOTERATE MEGLUMI 0.1ML: HCPCS | Performed by: INTERNAL MEDICINE

## 2021-11-08 PROCEDURE — 70553 MRI BRAIN STEM W/O & W/DYE: CPT | Performed by: INTERNAL MEDICINE

## 2021-11-09 ENCOUNTER — TELEPHONE (OUTPATIENT)
Dept: HEMATOLOGY/ONCOLOGY | Facility: HOSPITAL | Age: 71
End: 2021-11-09

## 2021-11-09 DIAGNOSIS — C50.111 MALIGNANT NEOPLASM OF CENTRAL PORTION OF RIGHT BREAST IN FEMALE, ESTROGEN RECEPTOR POSITIVE (HCC): Primary | ICD-10-CM

## 2021-11-09 DIAGNOSIS — R93.89 ABNORMAL FINDING ON IMAGING: ICD-10-CM

## 2021-11-09 DIAGNOSIS — M89.9 LYTIC BONE LESIONS ON XRAY: ICD-10-CM

## 2021-11-09 DIAGNOSIS — Z17.0 MALIGNANT NEOPLASM OF CENTRAL PORTION OF RIGHT BREAST IN FEMALE, ESTROGEN RECEPTOR POSITIVE (HCC): Primary | ICD-10-CM

## 2021-11-09 NOTE — TELEPHONE ENCOUNTER
Spoke to pt. She did fall in 3/21, hurt ribs and back. Rajinder PET.  Labs to r/o myeloma and also check tumor markers

## 2021-11-19 ENCOUNTER — TELEPHONE (OUTPATIENT)
Dept: HEMATOLOGY/ONCOLOGY | Age: 71
End: 2021-11-19

## 2021-11-19 ENCOUNTER — TELEPHONE (OUTPATIENT)
Dept: HEMATOLOGY/ONCOLOGY | Facility: HOSPITAL | Age: 71
End: 2021-11-19

## 2021-11-19 NOTE — TELEPHONE ENCOUNTER
Patient call to speak with Dr. Philipp Guevara. She is following up with you about referral and scheduled tests for her. Please call when able. Thank you.

## 2021-11-19 NOTE — TELEPHONE ENCOUNTER
Returned her call,FATOUMATA to let her know our  will work on authorization once the PET scan is scheduled. And we can schedule lab draw at one of the cancer centers, or she can go to one of the [facility] labs.    Asked to call back wit

## 2021-11-29 ENCOUNTER — NURSE ONLY (OUTPATIENT)
Dept: HEMATOLOGY/ONCOLOGY | Age: 71
End: 2021-11-29
Attending: INTERNAL MEDICINE
Payer: MEDICARE

## 2021-11-29 DIAGNOSIS — E03.9 ACQUIRED HYPOTHYROIDISM: ICD-10-CM

## 2021-11-29 DIAGNOSIS — M89.9 LYTIC BONE LESIONS ON XRAY: ICD-10-CM

## 2021-11-29 DIAGNOSIS — R93.89 ABNORMAL FINDING ON IMAGING: ICD-10-CM

## 2021-11-29 DIAGNOSIS — C50.111 MALIGNANT NEOPLASM OF CENTRAL PORTION OF RIGHT BREAST IN FEMALE, ESTROGEN RECEPTOR POSITIVE (HCC): ICD-10-CM

## 2021-11-29 DIAGNOSIS — Z17.0 MALIGNANT NEOPLASM OF CENTRAL PORTION OF RIGHT BREAST IN FEMALE, ESTROGEN RECEPTOR POSITIVE (HCC): ICD-10-CM

## 2021-11-29 PROCEDURE — 86300 IMMUNOASSAY TUMOR CA 15-3: CPT

## 2021-11-29 PROCEDURE — 84165 PROTEIN E-PHORESIS SERUM: CPT

## 2021-11-29 PROCEDURE — 83883 ASSAY NEPHELOMETRY NOT SPEC: CPT

## 2021-11-29 PROCEDURE — 36415 COLL VENOUS BLD VENIPUNCTURE: CPT

## 2021-11-29 PROCEDURE — 86334 IMMUNOFIX E-PHORESIS SERUM: CPT

## 2021-11-29 PROCEDURE — 82784 ASSAY IGA/IGD/IGG/IGM EACH: CPT

## 2021-11-29 PROCEDURE — 84443 ASSAY THYROID STIM HORMONE: CPT

## 2021-12-02 ENCOUNTER — TELEPHONE (OUTPATIENT)
Dept: INTERNAL MEDICINE CLINIC | Facility: CLINIC | Age: 71
End: 2021-12-02

## 2021-12-02 ENCOUNTER — OFFICE VISIT (OUTPATIENT)
Dept: INTERNAL MEDICINE CLINIC | Facility: CLINIC | Age: 71
End: 2021-12-02
Payer: MEDICARE

## 2021-12-02 VITALS
SYSTOLIC BLOOD PRESSURE: 122 MMHG | OXYGEN SATURATION: 98 % | TEMPERATURE: 98 F | WEIGHT: 196.63 LBS | RESPIRATION RATE: 16 BRPM | HEART RATE: 78 BPM | DIASTOLIC BLOOD PRESSURE: 68 MMHG | HEIGHT: 62.21 IN | BODY MASS INDEX: 35.73 KG/M2

## 2021-12-02 DIAGNOSIS — E03.9 ACQUIRED HYPOTHYROIDISM: ICD-10-CM

## 2021-12-02 DIAGNOSIS — I10 ESSENTIAL HYPERTENSION: ICD-10-CM

## 2021-12-02 DIAGNOSIS — F51.01 PRIMARY INSOMNIA: ICD-10-CM

## 2021-12-02 DIAGNOSIS — I10 PRIMARY HYPERTENSION: ICD-10-CM

## 2021-12-02 DIAGNOSIS — Z00.00 ENCOUNTER FOR ANNUAL HEALTH EXAMINATION: Primary | ICD-10-CM

## 2021-12-02 DIAGNOSIS — Z23 NEED FOR VACCINATION: ICD-10-CM

## 2021-12-02 DIAGNOSIS — I87.2 VENOUS INSUFFICIENCY: ICD-10-CM

## 2021-12-02 DIAGNOSIS — N18.31 STAGE 3A CHRONIC KIDNEY DISEASE (HCC): ICD-10-CM

## 2021-12-02 PROBLEM — E66.01 MORBID (SEVERE) OBESITY DUE TO EXCESS CALORIES (HCC): Status: ACTIVE | Noted: 2021-12-02

## 2021-12-02 PROCEDURE — G0008 ADMIN INFLUENZA VIRUS VAC: HCPCS | Performed by: INTERNAL MEDICINE

## 2021-12-02 PROCEDURE — 90662 IIV NO PRSV INCREASED AG IM: CPT | Performed by: INTERNAL MEDICINE

## 2021-12-02 PROCEDURE — 99214 OFFICE O/P EST MOD 30 MIN: CPT | Performed by: INTERNAL MEDICINE

## 2021-12-02 PROCEDURE — G0439 PPPS, SUBSEQ VISIT: HCPCS | Performed by: INTERNAL MEDICINE

## 2021-12-02 RX ORDER — FUROSEMIDE 40 MG/1
40 TABLET ORAL EVERY OTHER DAY
Qty: 45 TABLET | Refills: 1 | Status: SHIPPED | OUTPATIENT
Start: 2021-12-02

## 2021-12-02 RX ORDER — LEVOTHYROXINE SODIUM 0.07 MG/1
75 TABLET ORAL
Qty: 90 TABLET | Refills: 1 | Status: SHIPPED | OUTPATIENT
Start: 2021-12-02

## 2021-12-02 RX ORDER — ESZOPICLONE 1 MG/1
TABLET, FILM COATED ORAL
Qty: 30 TABLET | Refills: 0 | Status: SHIPPED | OUTPATIENT
Start: 2021-12-02 | End: 2022-02-04

## 2021-12-02 RX ORDER — POTASSIUM CHLORIDE 20 MEQ/1
20 TABLET, EXTENDED RELEASE ORAL EVERY OTHER DAY
Qty: 45 TABLET | Refills: 1 | Status: SHIPPED | OUTPATIENT
Start: 2021-12-02

## 2021-12-02 RX ORDER — LISINOPRIL 10 MG/1
10 TABLET ORAL DAILY
Qty: 90 TABLET | Refills: 1 | Status: SHIPPED | OUTPATIENT
Start: 2021-12-02

## 2021-12-02 NOTE — PROGRESS NOTES
HPI:   Marcellus Ortiz is a 73year old female who presents for a medicare initial AWV      I also see her for HTN and venous insufficiency on prn lasix and KCL , and hypothyroidism and occ insomnia    He sleep is troublesome, , falls asleepl for 4 hours ORTHOPEDIC)    Patient Active Problem List:     Rosacea     Venous insufficiency     HTN (hypertension)     Neuropathy     Malignant neoplasm of central portion of right female breast-ER/Her 2 +     Vitamin D deficiency     JAMES (obstructive sleep apnea) Onychomycosis, JAMES on CPAP (2/19/2016), Periorbital swelling, Personal history of antineoplastic chemotherapy (7/13/15), PONV (postoperative nausea and vomiting), Rosacea, Sleep apnea, Spinal stenosis of lumbar region at multiple levels (8/1/13), Subclinic has varicose veins and bipedal edema  CHEST clear to P and A  HEART RRR no m or g  She has stigmata of b/l LE varicose vein and insufficiency disease    SUGGESTED VACCINATIONS - Influenza, Pneumococcal, Zoster, Tetanus     Immunization History   Administer information           PLAN:  The patient indicates understanding of these issues and agrees to the plan. No follow-ups on file.      Jovanna Sanders MD, 11/21/2017     General Health     In the past six months, have you lost more than 10 pounds wi quick review of chart, separate sheet to patient  [address] Internal Lab or Procedure External Lab or Procedure   Diabetes Screening      HbgA1C   Annually No results found for: A1C No flowsheet data found.     Fasting birthday    Pneumococcal 23 (Pneumovax)  Covered Once after 65 No vaccine history found Please get once after your 65th birthday    Hepatitis B for Moderate/High Risk No vaccine history found Medium/high risk factors:   End-stage renal disease   Hemophilia date: 12/09/2014 No flowsheet data found.          Template: [employer] MEDICARE ANNUAL ASSESSMENT FEMALE [[id number]]

## 2021-12-02 NOTE — PATIENT INSTRUCTIONS
Vianney Daniel's SCREENING SCHEDULE   Tests on this list are recommended by your physician but may not be covered, or covered at this frequency, by your insurer. Please check with your insurance carrier before scheduling to verify coverage.    Christy Zamora 12/06/2019      No recommendations at this time   Pap and Pelvic    Pap   Covered every 2 years for women at normal risk;  Annually if at high risk -  No recommendations at this time    Chlamydia Annually if high risk -  No recommendations at this time   Sc http://www. idph.state. [place]/public/books/advin.htm  A link to the [facility]. This site has a lot of good information including definitions of the different types of Advance Directives.  It also has the State forms available on it's webs

## 2021-12-03 ENCOUNTER — HOSPITAL ENCOUNTER (OUTPATIENT)
Dept: NUCLEAR MEDICINE | Facility: HOSPITAL | Age: 71
Discharge: HOME | End: 2021-12-03
Attending: INTERNAL MEDICINE
Payer: MEDICARE

## 2021-12-03 ENCOUNTER — TELEPHONE (OUTPATIENT)
Dept: HEMATOLOGY/ONCOLOGY | Facility: HOSPITAL | Age: 71
End: 2021-12-03

## 2021-12-03 ENCOUNTER — PATIENT MESSAGE (OUTPATIENT)
Dept: HEMATOLOGY/ONCOLOGY | Age: 71
End: 2021-12-03

## 2021-12-03 DIAGNOSIS — M89.9 LYTIC BONE LESIONS ON XRAY: ICD-10-CM

## 2021-12-03 DIAGNOSIS — Z17.0 MALIGNANT NEOPLASM OF CENTRAL PORTION OF RIGHT BREAST IN FEMALE, ESTROGEN RECEPTOR POSITIVE (HCC): ICD-10-CM

## 2021-12-03 DIAGNOSIS — R93.89 ABNORMAL FINDING ON IMAGING: ICD-10-CM

## 2021-12-03 DIAGNOSIS — C50.111 MALIGNANT NEOPLASM OF CENTRAL PORTION OF RIGHT BREAST IN FEMALE, ESTROGEN RECEPTOR POSITIVE (HCC): ICD-10-CM

## 2021-12-03 PROCEDURE — 82962 GLUCOSE BLOOD TEST: CPT

## 2021-12-03 PROCEDURE — 78815 PET IMAGE W/CT SKULL-THIGH: CPT | Performed by: INTERNAL MEDICINE

## 2021-12-03 NOTE — TELEPHONE ENCOUNTER
Patient had a pet scan completed today and she wants to discuss the results, patient believes bone cancer was detected

## 2021-12-04 NOTE — TELEPHONE ENCOUNTER
Spoke to pt. obvious uptake in bone in abnormal area seen on MRI. She has small axillary LN, had Covid booster there few days back. No other area of metastasis. Will discuss her case in neuro-oncology tumor board. MRI was done for headaches.   She did f

## 2021-12-07 ENCOUNTER — TELEPHONE (OUTPATIENT)
Dept: HEMATOLOGY/ONCOLOGY | Facility: HOSPITAL | Age: 71
End: 2021-12-07

## 2021-12-07 ENCOUNTER — HOSPITAL ENCOUNTER (OUTPATIENT)
Dept: BONE DENSITY | Age: 71
Discharge: HOME | End: 2021-12-07
Attending: INTERNAL MEDICINE
Payer: MEDICARE

## 2021-12-07 DIAGNOSIS — M85.80 OSTEOPENIA, UNSPECIFIED LOCATION: ICD-10-CM

## 2021-12-07 DIAGNOSIS — Z79.811 LONG TERM (CURRENT) USE OF AROMATASE INHIBITORS: ICD-10-CM

## 2021-12-07 PROCEDURE — 77080 DXA BONE DENSITY AXIAL: CPT | Performed by: INTERNAL MEDICINE

## 2021-12-07 NOTE — TELEPHONE ENCOUNTER
Contacted pt to let her know her images were reviewed in neuro clinic, and biopsy is recommended. Per the team recommended to be done by neuro surgeon. Questions addressed to the best of my knowledge. Given the phone number for Dr Jovany Crane office.    E Home

## 2021-12-09 ENCOUNTER — TELEPHONE (OUTPATIENT)
Dept: HEMATOLOGY/ONCOLOGY | Facility: HOSPITAL | Age: 71
End: 2021-12-09

## 2021-12-09 NOTE — TELEPHONE ENCOUNTER
Patient had a message from Dr Chitra Tay on her MyChart but when went to get the message it was gone so she would like to discuss that, along with the referral to Dr Jimenez Levels

## 2021-12-09 NOTE — TELEPHONE ENCOUNTER
Returned her call, and reviewed the missing message, it is attached to the dexa scan result. Resent as mychart message for her reference. She has not heard from Dr Jai Villanueva, and will call tomorrow. Emotional support offered.

## 2021-12-10 ENCOUNTER — TELEPHONE (OUTPATIENT)
Dept: SURGERY | Facility: CLINIC | Age: 71
End: 2021-12-10

## 2021-12-10 NOTE — TELEPHONE ENCOUNTER
Patient has been scheduled for 2/22/22 @ 9 am with Ellie Whitten for NP Skull Bone biopsy, referred by Dr. Nirav Acharya, PET scan in chart. Patient also states her case has already been discussed at the Neuro Conference per Kayla Guerrero.  Please advise if/when patient sh

## 2021-12-10 NOTE — TELEPHONE ENCOUNTER
LVM to advise patient appointment has been rescheduled for 12/21/21 @ 9 am with . Please confirm when patient returns call.

## 2021-12-29 ENCOUNTER — APPOINTMENT (OUTPATIENT)
Dept: HEMATOLOGY/ONCOLOGY | Age: 71
End: 2021-12-29
Attending: INTERNAL MEDICINE
Payer: MEDICARE

## 2022-01-11 ENCOUNTER — TELEPHONE (OUTPATIENT)
Dept: SURGERY | Facility: CLINIC | Age: 72
End: 2022-01-11

## 2022-01-11 ENCOUNTER — NURSE ONLY (OUTPATIENT)
Dept: HEMATOLOGY/ONCOLOGY | Facility: HOSPITAL | Age: 72
End: 2022-01-11
Attending: NEUROLOGICAL SURGERY
Payer: MEDICARE

## 2022-01-11 ENCOUNTER — OFFICE VISIT (OUTPATIENT)
Dept: NEUROLOGY | Facility: CLINIC | Age: 72
End: 2022-01-11
Payer: MEDICARE

## 2022-01-11 VITALS
HEIGHT: 62.01 IN | HEART RATE: 101 BPM | BODY MASS INDEX: 35.98 KG/M2 | RESPIRATION RATE: 16 BRPM | WEIGHT: 198 LBS | OXYGEN SATURATION: 98 % | DIASTOLIC BLOOD PRESSURE: 78 MMHG | TEMPERATURE: 96 F | SYSTOLIC BLOOD PRESSURE: 119 MMHG

## 2022-01-11 DIAGNOSIS — R51.9 DAILY HEADACHE: ICD-10-CM

## 2022-01-11 DIAGNOSIS — M89.9 SKULL LESION: Primary | ICD-10-CM

## 2022-01-11 PROCEDURE — 99211 OFF/OP EST MAY X REQ PHY/QHP: CPT

## 2022-01-11 PROCEDURE — 99204 OFFICE O/P NEW MOD 45 MIN: CPT | Performed by: PHYSICIAN ASSISTANT

## 2022-01-11 NOTE — H&P
Patient: Cali Gregory  Medical Record Number: [id number]  YOB: 1950  PCP: Jean Paul Rinaldi MD    Referring Physician: Dr. Jose J Lyons  Reason for visit: Skull lesion    Dear Dr. Jose J Lyons   Thank you very much for requesting this consultati vitamins. No visual changes. Has noticed some changes with short term memory, feels this is due to age. States 40 minutes of exercise daily. Will sometimes lose count during exercises. When concentrating she has no issues. Good coordination.  Poor balance NEEDLE CORE      x3  benign   • BREAST RECONSTRUCTION Bilateral 2/23/15    with (FEMI)abdominal free flap   • COLONOSCOPY  age 56 yrs   • COLONOSCOPY     • COLONOSCOPY N/A 3/16/2018    Procedure: COLONOSCOPY, POSSIBLE BIOPSY, POSSIBLE POLYPECTOMY 26977;   Lindsey Gaspar RASH, ITCHING    Comment:Nola  Bacitracin              RASH, ITCHING  Codeine                 RASH, ITCHING, NAUSEA AND VOMITING  Doxycycline             RASH, ITCHING  Levaquin [Levofloxa*    RASH, ITCHING  Neosporin [Neomycin* Cap Take 1 capsule by mouth daily. • Ferrous Sulfate 325 (65 FE) MG Oral Tab Take 65 mg by mouth daily. • Vitamin C (VITAMIN C) 500 MG Oral Tab Take 500 mg by mouth daily. REVIEW OF SYSTEMS   Comprehensive review of systems done.  Cristela Rolle RADIOPHARMACEUTICAL:    10.5 mCi F-18 FDG   FASTING GLUCOSE LEVEL:  92 mg/dl   INJECTION TIME:         1245 hours   SCAN TIME:              1403 hours       FINDINGS:         There is expected markedly intense uptake involving the cerebrum, including the Result    Narrative   PROCEDURE:  MRI BRAIN (W+WO) (CPT=70553)       COMPARISON:  None.       INDICATIONS:  Z17.0 Malignant neoplasm of central portion of right breast in female, estrogen receptor positive ([facility] [address] C50.111 Malignant neoplasm of central portio subtle areas of suspected abnormal signal and enhancement in both right and left frontal calvarium, with small areas, for example on series 1101, image 5.       With contrast infusion, there is no pathologic enhancement pattern identified.  Developmental v If the plan is to proceed with intra operative needle biopsy, please see below:   - Patient is recommended for the below surgical procedure with Dr. Green Jersey:    - [facility]    - Patient would require

## 2022-01-12 NOTE — TELEPHONE ENCOUNTER
Will consult with Dr. Wendy Narayan upon his return to clinic on 1/13/22 as to where he would like pt scheduled     [facility] reviewing CPT codes and will get back to us.

## 2022-01-17 ENCOUNTER — TELEPHONE (OUTPATIENT)
Dept: NEUROLOGY | Facility: CLINIC | Age: 72
End: 2022-01-17

## 2022-01-18 ENCOUNTER — TELEPHONE (OUTPATIENT)
Dept: SURGERY | Facility: CLINIC | Age: 72
End: 2022-01-18

## 2022-01-18 NOTE — TELEPHONE ENCOUNTER
Called pt and spoke to patient. Went over sx instructions and informed that when sx date confirmed and post ops scheduled she will receive her pre-op instructions via [facility]. Informed pt that she will receive a call from nursing on 1/19/22 to confirm date. Inquired with OR  Lisa Mccurdy if an there is availability for this case on a Tue.  After 1pm. Waiting on response

## 2022-01-18 NOTE — TELEPHONE ENCOUNTER
Routed to [facility] with [facility] nursing. We can work to schedule her procedure once electives are fully opened.  Discussed with Dr. Christo Dorsey, okay to wait for electives to be open

## 2022-01-18 NOTE — TELEPHONE ENCOUNTER
Per last OV note by MARTI Calixto on 1-11-22   \"  PLAN:   1. Medication: None prescribed   2. Imaging:                 - Reviewed today:                              - MRI and PET reviewed:                                            - Agree with radiology, abnormal bone marrow signal and enhancement involving the left frontal calvarium probably with additional subtle areas of similar signal/enhancement in the calvarium, concern for potential metastases involving the bone. On PET, no elevated FDG activity, agree could reflect a treated metastatic lesion or indolent region of sclerosis. - Ordered today:                              - None at this time  3. Treatment plan:                 - Recommend considering a needle biopsy. Either with neuro navigation with MRI for intra operative needle biopsy vs needle biopsy with RUSSELL. Patient would like to proceed with Dr. Yasmine Payan                 - If the plan is to proceed with intra operative needle biopsy, please see below:                - Patient is recommended for the below surgical procedure with Dr. Yasmine Payan:                              - [address]                              - Patient would require a new MRI with neuro navigation cuts prior to surgical intervention                              - Dr. Yasmine Payan read and confirmed the above surgical procedure                              - Patient states no hx of cardiac issues, heart attack or stroke. No SOB or CP at rest or with exertion. Patient will require PCP clearance. Will defer to PCP for further specialty clearance. Discussed this with the patient. Patient agreed with this plan, verbalized understanding.                              - Dr. Yasmine Payan provided extensive surgical, including but not limited to, risks, benefits, complications, surgical procedure, alternatives and what to expect pre-, intra-postoperatively  4.  Follow up to be determined after treatment plan has been determined or call or follow up sooner or go to the ED for any new, worsening or concerning signs or symptoms       Please advise/place surgery order

## 2022-01-18 NOTE — TELEPHONE ENCOUNTER
Patient would like to speak with Nurse. Advised patient Lizzeth Patel is out of the office this week. Patient would still appreciate call from Nurse today.

## 2022-01-18 NOTE — TELEPHONE ENCOUNTER
Per PSR 1/17/22 \"Other\" TE    \"pt would like a call to discuss next steps following 1/11/22 appt; pls call her today after 3:45pm\"

## 2022-01-18 NOTE — TELEPHONE ENCOUNTER
RN waiting on clarification from surgeon if he will be performing a craniotomy or if the biopsy will only be superficial on skull as this will determine billing codes and amount of time pt will need to stay in hospital. Dr. Sandhya Linares out of office until next week and unavailable to clarify. RN will schedule pt out to give time for surgeon to clarify for sx order submission. Pt has straight Medicare so no PA needed.      Offering sx date 2/7/22 at 0730    Please refer to \"schedule surgery\" TE

## 2022-01-19 ENCOUNTER — TELEPHONE (OUTPATIENT)
Dept: INTERNAL MEDICINE CLINIC | Facility: CLINIC | Age: 72
End: 2022-01-19

## 2022-01-19 NOTE — TELEPHONE ENCOUNTER
Called patient back to discuss her questions and concerns. Patient requested information for timing of pre-op testing, PCP clearance appointment, and MRI appointment. After an extended discussion, patient stated that she will call PCP to arrange preop testing, etc., and will scheduled MRI for mapping. Patient anticipates seeing Dr. Louis Francois on 2/4 and completing MRI on 2/14. Patient thanked Nursing for the information and the call was ended.

## 2022-01-19 NOTE — TELEPHONE ENCOUNTER
Pt called and stated she has been speaking with the nurse Brittany Burgos and would like to speak with her, she has a few more questions that she would like answered.

## 2022-01-19 NOTE — TELEPHONE ENCOUNTER
Date of pre-op appt:  TBD  Provider pre-op scheduled with: TBD  Surgeon's name:  Dr Abdullahi Michel   Phone #:  [phone]   Fax #:  [phone]  Surgery date:  2/15/22  Procedure/diagnosis:  Left frontal needle biopsy    Pt will CB to schedule Pre op appt.

## 2022-01-19 NOTE — TELEPHONE ENCOUNTER
Faxed pre-op clearance form to Dr Karen Upton office. Copy in triage pending. Pt still needs to schedule.

## 2022-01-19 NOTE — TELEPHONE ENCOUNTER
If patient has Medicare primary insurance we do not contact Medicare they pay according to medical necessity therefore no PA obtained or needed for this procedure.

## 2022-01-19 NOTE — TELEPHONE ENCOUNTER
Patient is scheduled for needle biopsy of skull on 2/15/22 with Dr Tammie Vaca. Y  form completed  Y Surgery order signed   Jessica Sanchez on surgery sheet  Y Placed on outlook calendar  Y [employer] message sent to patient with sx instructions  Y Faxed pre-op clearance request to PCP Dr. Britni Meyer appointments made   Y PA not needed. Routed to PA team to confirm per pt request  Y 3 day follow up reminder placed.      Will clarify with Dr. Tammie Vaca is SA request needed

## 2022-01-19 NOTE — TELEPHONE ENCOUNTER
Received Requirements for Pre-Operative Clearance Requests form PCP's office.   Endorsed to RN for completion

## 2022-01-19 NOTE — TELEPHONE ENCOUNTER
Letter from Dr. Karen Upton office already received requesting medical clearance. See Letters tab.   Printed & given to [address] [place] [address] 1

## 2022-01-31 ENCOUNTER — TELEPHONE (OUTPATIENT)
Dept: SURGERY | Facility: CLINIC | Age: 72
End: 2022-01-31

## 2022-01-31 DIAGNOSIS — R59.9 ENLARGED LYMPH NODE: Primary | ICD-10-CM

## 2022-01-31 NOTE — TELEPHONE ENCOUNTER
Patient called requesting additional imaging to be order following her PET scan. Ordered US left axillary per Dr. Yahaira Sánchez. Notified patient. Advised patient to make a follow up appointment with Dr. Yahaira Sánchez after imaging is completed.  Patient voiced [place]

## 2022-02-04 ENCOUNTER — OFFICE VISIT (OUTPATIENT)
Dept: INTERNAL MEDICINE CLINIC | Facility: CLINIC | Age: 72
End: 2022-02-04
Payer: MEDICARE

## 2022-02-04 VITALS
HEART RATE: 91 BPM | OXYGEN SATURATION: 96 % | SYSTOLIC BLOOD PRESSURE: 122 MMHG | BODY MASS INDEX: 35.83 KG/M2 | DIASTOLIC BLOOD PRESSURE: 74 MMHG | WEIGHT: 197.19 LBS | HEIGHT: 62.01 IN | TEMPERATURE: 98 F | RESPIRATION RATE: 16 BRPM

## 2022-02-04 DIAGNOSIS — E03.9 ACQUIRED HYPOTHYROIDISM: ICD-10-CM

## 2022-02-04 DIAGNOSIS — N18.31 STAGE 3A CHRONIC KIDNEY DISEASE (HCC): ICD-10-CM

## 2022-02-04 DIAGNOSIS — C50.111 MALIGNANT NEOPLASM OF CENTRAL PORTION OF RIGHT BREAST IN FEMALE, ESTROGEN RECEPTOR POSITIVE (HCC): ICD-10-CM

## 2022-02-04 DIAGNOSIS — Z01.818 PREOPERATIVE CLEARANCE: Primary | ICD-10-CM

## 2022-02-04 DIAGNOSIS — I87.2 VENOUS INSUFFICIENCY: ICD-10-CM

## 2022-02-04 DIAGNOSIS — M89.9 FRONTAL SKULL LESION: ICD-10-CM

## 2022-02-04 DIAGNOSIS — F51.01 PRIMARY INSOMNIA: ICD-10-CM

## 2022-02-04 DIAGNOSIS — I10 PRIMARY HYPERTENSION: ICD-10-CM

## 2022-02-04 DIAGNOSIS — L30.9 HAND DERMATITIS: ICD-10-CM

## 2022-02-04 DIAGNOSIS — Z17.0 MALIGNANT NEOPLASM OF CENTRAL PORTION OF RIGHT BREAST IN FEMALE, ESTROGEN RECEPTOR POSITIVE (HCC): ICD-10-CM

## 2022-02-04 PROCEDURE — 99214 OFFICE O/P EST MOD 30 MIN: CPT | Performed by: INTERNAL MEDICINE

## 2022-02-04 RX ORDER — ESZOPICLONE 1 MG/1
TABLET, FILM COATED ORAL
Qty: 30 TABLET | Refills: 0 | Status: SHIPPED | OUTPATIENT
Start: 2022-02-04 | End: 2022-03-15

## 2022-02-07 ENCOUNTER — EKG ENCOUNTER (OUTPATIENT)
Dept: LAB | Age: 72
End: 2022-02-07
Attending: NEUROLOGICAL SURGERY
Payer: MEDICARE

## 2022-02-07 ENCOUNTER — HOSPITAL ENCOUNTER (OUTPATIENT)
Dept: GENERAL RADIOLOGY | Age: 72
Discharge: HOME | End: 2022-02-07
Attending: NEUROLOGICAL SURGERY
Payer: MEDICARE

## 2022-02-07 ENCOUNTER — LABORATORY ENCOUNTER (OUTPATIENT)
Dept: LAB | Age: 72
End: 2022-02-07
Attending: NEUROLOGICAL SURGERY
Payer: MEDICARE

## 2022-02-07 ENCOUNTER — HOSPITAL ENCOUNTER (OUTPATIENT)
Dept: ULTRASOUND IMAGING | Age: 72
Discharge: HOME | End: 2022-02-07
Attending: SURGERY
Payer: MEDICARE

## 2022-02-07 DIAGNOSIS — Z85.3 HX OF BREAST CANCER: ICD-10-CM

## 2022-02-07 DIAGNOSIS — N63.20 MASSES OF BOTH BREASTS: ICD-10-CM

## 2022-02-07 DIAGNOSIS — N63.10 MASSES OF BOTH BREASTS: ICD-10-CM

## 2022-02-07 DIAGNOSIS — M89.9 SKULL LESION: ICD-10-CM

## 2022-02-07 LAB
ALBUMIN SERPL-MCNC: 3.9 G/DL (ref 3.4–5)
ALBUMIN/GLOB SERPL: 1.2 {RATIO} (ref 1–2)
ALP LIVER SERPL-CCNC: 65 U/L
ANION GAP SERPL CALC-SCNC: 9 MMOL/L (ref 0–18)
ANTIBODY SCREEN: NEGATIVE
APTT PPP: 25.7 SECONDS (ref 23.3–35.6)
AST SERPL-CCNC: 19 U/L (ref 15–37)
BASOPHILS # BLD AUTO: 0.06 X10(3) UL (ref 0–0.2)
BASOPHILS NFR BLD AUTO: 0.8 %
BILIRUB SERPL-MCNC: 0.4 MG/DL (ref 0.1–2)
BUN BLD-MCNC: 23 MG/DL (ref 7–18)
CALCIUM BLD-MCNC: 9.5 MG/DL (ref 8.5–10.1)
CHLORIDE SERPL-SCNC: 105 MMOL/L (ref 98–112)
CO2 SERPL-SCNC: 26 MMOL/L (ref 21–32)
CREAT BLD-MCNC: 0.83 MG/DL
EOSINOPHIL # BLD AUTO: 0.15 X10(3) UL (ref 0–0.7)
EOSINOPHIL NFR BLD AUTO: 2 %
ERYTHROCYTE [DISTWIDTH] IN BLOOD BY AUTOMATED COUNT: 13.5 %
FASTING STATUS PATIENT QL REPORTED: YES
GLOBULIN PLAS-MCNC: 3.2 G/DL (ref 2.8–4.4)
GLUCOSE BLD-MCNC: 93 MG/DL (ref 70–99)
HCT VFR BLD AUTO: 42 %
HGB BLD-MCNC: 13.6 G/DL
IMM GRANULOCYTES # BLD AUTO: 0.03 X10(3) UL (ref 0–1)
IMM GRANULOCYTES NFR BLD: 0.4 %
INR BLD: 0.99 (ref 0.8–1.2)
LYMPHOCYTES # BLD AUTO: 2.09 X10(3) UL (ref 1–4)
LYMPHOCYTES NFR BLD AUTO: 27.5 %
MCH RBC QN AUTO: 30.6 PG (ref 26–34)
MCHC RBC AUTO-ENTMCNC: 32.4 G/DL (ref 31–37)
MCV RBC AUTO: 94.6 FL
MONOCYTES # BLD AUTO: 0.51 X10(3) UL (ref 0.1–1)
MONOCYTES NFR BLD AUTO: 6.7 %
NEUTROPHILS # BLD AUTO: 4.77 X10 (3) UL (ref 1.5–7.7)
NEUTROPHILS # BLD AUTO: 4.77 X10(3) UL (ref 1.5–7.7)
NEUTROPHILS NFR BLD AUTO: 62.6 %
OSMOLALITY SERPL CALC.SUM OF ELEC: 293 MOSM/KG (ref 275–295)
PLATELET # BLD AUTO: 263 10(3)UL (ref 150–450)
POTASSIUM SERPL-SCNC: 4.2 MMOL/L (ref 3.5–5.1)
PROT SERPL-MCNC: 7.1 G/DL (ref 6.4–8.2)
PROTHROMBIN TIME: 12.9 SECONDS (ref 11.6–14.8)
RBC # BLD AUTO: 4.44 X10(6)UL
RH BLOOD TYPE: NEGATIVE
SODIUM SERPL-SCNC: 140 MMOL/L (ref 136–145)
WBC # BLD AUTO: 7.6 X10(3) UL (ref 4–11)

## 2022-02-07 PROCEDURE — 36415 COLL VENOUS BLD VENIPUNCTURE: CPT

## 2022-02-07 PROCEDURE — 85730 THROMBOPLASTIN TIME PARTIAL: CPT

## 2022-02-07 PROCEDURE — 87081 CULTURE SCREEN ONLY: CPT

## 2022-02-07 PROCEDURE — 85610 PROTHROMBIN TIME: CPT

## 2022-02-07 PROCEDURE — 86900 BLOOD TYPING SEROLOGIC ABO: CPT

## 2022-02-07 PROCEDURE — 85025 COMPLETE CBC W/AUTO DIFF WBC: CPT

## 2022-02-07 PROCEDURE — 93010 ELECTROCARDIOGRAM REPORT: CPT | Performed by: INTERNAL MEDICINE

## 2022-02-07 PROCEDURE — 86901 BLOOD TYPING SEROLOGIC RH(D): CPT

## 2022-02-07 PROCEDURE — 93005 ELECTROCARDIOGRAM TRACING: CPT

## 2022-02-07 PROCEDURE — 71046 X-RAY EXAM CHEST 2 VIEWS: CPT | Performed by: NEUROLOGICAL SURGERY

## 2022-02-07 PROCEDURE — 86920 COMPATIBILITY TEST SPIN: CPT

## 2022-02-07 PROCEDURE — 86850 RBC ANTIBODY SCREEN: CPT

## 2022-02-07 PROCEDURE — 80053 COMPREHEN METABOLIC PANEL: CPT

## 2022-02-07 PROCEDURE — 76641 ULTRASOUND BREAST COMPLETE: CPT | Performed by: SURGERY

## 2022-02-07 NOTE — TELEPHONE ENCOUNTER
Called patient back who stated that she is very hesitant to reschedule surgery to 2/9 due to multiple commitments she must manage pre-operatively. Discussed pre-op steps moving forward. Patient thanked Nursing for the call and the call was ended. Patient remains scheduled to undergo surgery on 2/15/22:    LEFT FRONTAL NEEDLE BIOPSY OF CALVARIAL LESION WITH NEURO NAVIGATION    E-mail sent to [facility] to request an SA for Dr. Josr Arias for this case on 2/15/22 as recommended by MARTI Palomo.

## 2022-02-07 NOTE — TELEPHONE ENCOUNTER
Received notification that patient is medically cleared for surgery. Per Dr. Lisset Santana on 2/4/22:    Elpidio Vaughan is cleared for surgery barring significant abnormalities on her pre-op testing. .  Consult sent back to Dr Ilya Hunter. \"  Abby Randolph patient to inquire if she would like to undergo surgery this week, 2/9/22 per Dr. Meghan Grace request.      Deepa Puenteron patient and Lydia on voicemail per HIPPA protocol. Called patient's home phone and spoke with patient's  Louis Faustin. Per Louis Carrier, patient may consider surgery date change, but will call back. Louis Carrier stated that pre op MRI will be of a concern to patient. Informed patient that MRI Brain for Neuro Josafat may be completed the DOS. Patient is currently obtaining pre op labs, called patient to possibly arrange for Covid testing to be completed today as well if patient chooses to change surgery date to 2/9/22. Patient did not answer call. Contact information was provided to patient for NIKHIL return call. In conversation with MARTI Anna:  Dr. Ilya Hunter may benefit from an SA. Awaiting reply prior to reaching out to [facility] for any requests for SA for this case.

## 2022-02-08 LAB
ATRIAL RATE: 73 BPM
P AXIS: 55 DEGREES
P-R INTERVAL: 132 MS
Q-T INTERVAL: 402 MS
QRS DURATION: 100 MS
QTC CALCULATION (BEZET): 442 MS
R AXIS: -44 DEGREES
T AXIS: 45 DEGREES
VENTRICULAR RATE: 73 BPM

## 2022-02-10 ENCOUNTER — TELEPHONE (OUTPATIENT)
Dept: SURGERY | Facility: CLINIC | Age: 72
End: 2022-02-10

## 2022-02-10 NOTE — TELEPHONE ENCOUNTER
Rec'd fax from [facility], Dr. Saavedra Pacdaly Pre-op exam notes. Placing in nurses bin for pickup.

## 2022-02-10 NOTE — TELEPHONE ENCOUNTER
Miguelangel Harding from pre-admission testing said anesthesiologist is asking for comments from Dr Liyah Hunt on patient's medical clearance regarding her EKG. Patient's surgery is 2/15/22    Please call Miguelangel Harding at [phone] with any questions. The fax number is [phone]. Thank you!

## 2022-02-11 ENCOUNTER — TELEPHONE (OUTPATIENT)
Dept: SURGERY | Facility: CLINIC | Age: 72
End: 2022-02-11

## 2022-02-11 NOTE — TELEPHONE ENCOUNTER
Rec'd fax from [facility]  pre admission testing for surgery 2/15/22. Placing in nurses bin for pickup.

## 2022-02-11 NOTE — TELEPHONE ENCOUNTER
Received fax from [facility] states that PCP did not state in [address] for clearance that EKG was reviewed. PAT requesting we reach out to PCP and advise she review EKG and advise if pt is optimized or needs further work up given the new septal infarct and RBBB on EKG.      Routed to Dr. Chao Graham

## 2022-02-11 NOTE — TELEPHONE ENCOUNTER
Incoming (mail or fax):  fax  Received from: [facility] pre admission testing   Documentation given to:  Pre op basket     Anesthesiologist wants note of clearance

## 2022-02-12 ENCOUNTER — LAB ENCOUNTER (OUTPATIENT)
Dept: LAB | Age: 72
End: 2022-02-12
Attending: NEUROLOGICAL SURGERY
Payer: MEDICARE

## 2022-02-12 DIAGNOSIS — M89.9 SKULL LESION: ICD-10-CM

## 2022-02-13 LAB — SARS-COV-2 RNA RESP QL NAA+PROBE: NOT DETECTED

## 2022-02-14 ENCOUNTER — TELEPHONE (OUTPATIENT)
Dept: SURGERY | Facility: CLINIC | Age: 72
End: 2022-02-14

## 2022-02-14 NOTE — TELEPHONE ENCOUNTER
Sneha aHwthorne RN from Dr. Zaid Torres office, informed the pt will need to see CARDS prior to surgery. Sneha Hawthorne stated she will be calling the patient to inform her. Ok to inform pt to reach out to Dr Cabello [facility] office for an appt?

## 2022-02-14 NOTE — TELEPHONE ENCOUNTER
Pt is scheduled for a biopsy tomorrow and she has questions she would like to discuss. Please call to advise.

## 2022-02-14 NOTE — TELEPHONE ENCOUNTER
Mohit Cover  From Pre Admission called and just needs Dr Keanu Rinaldi to address abnormal EKG and to give clearance. Fax over at [phone]   Sending high priority due to surgery tomorrow.    Thank you

## 2022-02-14 NOTE — TELEPHONE ENCOUNTER
Dr Green Captain is aware of below. Will hold. EKG was done by neuro. To hold for Dr Ting Suazo response.   Will need update to pre-op exam.

## 2022-02-14 NOTE — TELEPHONE ENCOUNTER
If that's her cardiologist but I think she may want her to see a more general cardiologist at [address]

## 2022-02-14 NOTE — TELEPHONE ENCOUNTER
Called patient and and discussed questions regarding pre op medication-hold, pre op care steps, post op care, pre op testing. Please see \"Schedule Surgery' TE with additional information.

## 2022-02-14 NOTE — TELEPHONE ENCOUNTER
Called and spoke with Pt, pt informed me that she will be seeing Dr Oseas Lerner with [facility] on Wednesday 02/16/2022. Pt wish to have EKG and Chest Xray sent to [facility]. (noted ordered my neuro, informed pt I will call neuro to have them forward the test to [facility] for the pts upcoming appt)  -Pt asked what was abnormal on the EKG informed her of what was stated below and advised that the best description of this will come from her cardiologist. Pt stated understanding and has no further question at this time. Called Neurology and spoke with Marlen Hancock. Informed Estela Merida if the pts plan to see [facility] and the need for the documents. Estela Merida stated she will send them over to [facility], fax number of [phone] provided with Dr Landon Florentino name.

## 2022-02-14 NOTE — TELEPHONE ENCOUNTER
ECG markedly changed from previous, w/evidence of anteroseptal ischemia w/loss of R waves and new q wave V2 w/a flipped T wave  Needs to see cardiology prior to surgery

## 2022-02-14 NOTE — TELEPHONE ENCOUNTER
Called patient back to discuss her concerns and answer questions that she may have. Discussed pre op steps, CXR and EKG. Patient stated that she had not yet received a call from PAT or OR scheduling with med-hold and surgery arrival information. PAT's number given to patient. Dr. Jayesh Bakeriters with alert regarding EKG results, and noted in TE from Cyndie Mullen at Dr. Nba Lobo office today:    Artie Must  From Pre Admission called and just needs Dr Charmayne Stank to address abnormal EKG and to give clearance. Fax over at [phone]   Sending high priority due to surgery tomorrow. Thank you. \"    Routed to Dr. Abdullahi Michel. Spoke with PAT nurse Clarence Mejia to notify PAT that Dr. Abdullahi Michel is aware of EKG results and awaiting PCP decision.

## 2022-02-14 NOTE — TELEPHONE ENCOUNTER
Received call from Isela Garcia [facility]. Patient has an apt with Dr Reyes Moron at [address] on 2-16. They are requesting the EKG and chest x-ray results be faxed to Dr Cruzito Jacobo office at [phone]. Faxed per request.  Will follow up on cardiac clearance.

## 2022-02-14 NOTE — TELEPHONE ENCOUNTER
Called PCP's office to discuss EKG result and if  Dr. Ubaldo Sweet has provided feedback. Per Jayce Bell RN:  \"Dr. Trish Canseco has not cleared patient pre operatively until she is seen by cardiology and undergoes any additional cardiac testing. \"    Noted that patient has seen Dr. Ghassan Dash in the past for cardiology. SHAWNA Bell stated that she will call patient to confirm new development. Called patient to discuss above information. Patient agreed to new surgery date of 3/2/22. Reviewed any pre op steps regarding needed clearances, changed post op appointments to 3/17/22 with B. Merline Lapping, PA at [phone], and 4/14/22 with Dr. Ruth Mills at [phone]. Patient thanked Nursing for the call and the discussion and the call was ended. Surgical case change request placed. [place] calendar updated. Paula Munoz [facility] has been notified by phone call and e-mail.

## 2022-02-16 ENCOUNTER — PATIENT MESSAGE (OUTPATIENT)
Dept: SURGERY | Facility: CLINIC | Age: 72
End: 2022-02-16

## 2022-02-16 ENCOUNTER — TELEPHONE (OUTPATIENT)
Dept: NEUROLOGY | Facility: CLINIC | Age: 72
End: 2022-02-16

## 2022-02-16 NOTE — TELEPHONE ENCOUNTER
From: Socorro Pettit  To: Adriana Kapilkilo Daniel  Sent: 2/16/2022 3:39 PM CST  Subject: Pre-op steps    Dear [facility]-    We have received cardiac clearance from the cardiologist today. We Received Aspirin guidance from Cardiologist Dr. Lynne Menjivar stating:      Malika Matiastoñito is okay to stop the baby aspirin 2 weeks proir to the procedure bit is to resume it as soon as safely possible after procedure\"     If you have any questions or concerns please contact our office at [phone] #1 or via [employer] message.      Thank you, and take care-      Clinical Staff Nurse,   [employer]

## 2022-02-16 NOTE — TELEPHONE ENCOUNTER
Received Aspirin guidance from pt Cardiologist Dr. Sara Jackson stating     Taniya Bronwyn is okay to stop the baby aspirin 2 weeks proir to the procedure bit is to resume it as soon as safely possible after procedure\"     Also stating pt is low risk for valeri/post operative for cardiac event    Faxed to [facility]

## 2022-02-16 NOTE — TELEPHONE ENCOUNTER
Noted that surgery order has been cancelled, and case change request that had been sent to OR on 2/14/22 was not noted by OR . Called Gaby Hayes in [place] who has requested a new surgery order. Order initiated and routed to [facility] to complete.

## 2022-02-16 NOTE — TELEPHONE ENCOUNTER
Rec'd Cardiac clearance and EKG notes from [address] [place] [address], dated on 2/16/22; placed in nurses bin for review

## 2022-02-16 NOTE — TELEPHONE ENCOUNTER
Received notification of cardiac clearance. Returned patient's call, reached voicemail. LMTCB per [facility] protocol. [employer] message sent to patient with aspirin-hold information.

## 2022-02-16 NOTE — TELEPHONE ENCOUNTER
Rec'd Pre-Operative Evaluation, from [address] [place] [address], dated on 2/16/22; placed in nurses bin for review

## 2022-02-17 NOTE — TELEPHONE ENCOUNTER
Spoke with patient and offered a new surgery date as recommended by Dr. Fatemeh Porter. Patient may change surgery from 3/2/22 to 2/23/22. Patient stated that she will address the offer with family and call back with her answer either today or tomorrow, 2/18/22. Patient thanked Nursing for the call and the offer and the call was ended.

## 2022-02-17 NOTE — TELEPHONE ENCOUNTER
Surgery order received from [facility] team.  New order completed and placed. CPT ULFQ:[id number]    Nursing in communication with patient yesterday via [employer] message regarding ASA hold information and to reach out to PAT to address new Covid test scheduling piror to 3/2/22 surgery.

## 2022-02-18 RX ORDER — SODIUM CHLORIDE, SODIUM LACTATE, POTASSIUM CHLORIDE, CALCIUM CHLORIDE 600; 310; 30; 20 MG/100ML; MG/100ML; MG/100ML; MG/100ML
INJECTION, SOLUTION INTRAVENOUS CONTINUOUS
Status: CANCELLED | OUTPATIENT
Start: 2022-02-18

## 2022-02-18 NOTE — TELEPHONE ENCOUNTER
From: [place] Mina Daniel  Sent: 2/17/2022 6:38 PM CST  To: Mary Houston 2 Nurse  Subject: Pre-op migue Chatterjee,  Thanks for the phone call and chance to get bone biopsy with Dr Corrie Amor on Wed Feb 23 at 8 am. Yes I would like to take that day. I got my week all switched around. Please confirm and I will phone central scheduling to book Covid test on Monday afternoon and MRI on Tuesday morning or afternoon. Let me know what else I need to do please. Thanks for the earlier date!   Rob Gauthier

## 2022-02-18 NOTE — TELEPHONE ENCOUNTER
Received call from patient who stated she would like to move up her surgery date to 2-23-22. Case change request sent.    Changed on surgery sheet  SA request updated via email  Changed on outlook  Post ops rescheduled      Nothing further needed

## 2022-02-21 NOTE — TELEPHONE ENCOUNTER
Due to family emergency Dr. Fatemeh Porter is no longer available for surgery on 2/23/22    Surgery is being postponed back to 3/2/22    LMTCB: Mychart also sent        Patient is re-scheduled for skull lesion surgery on 3/2/22 with Fatemeh Porter. Case change request sent- Y  Changed on surgery sheet-Y  Changed on outlook calendar-Y  [employer] message sent to patient with new sx date-Y  Reminded of need for PCP clearance-NA  Post-op appointments changed Y  PA Needed.  Routed to PA team with sx date change Y  Email sent to [facility] with changes Y  3 day follow up reminder will be postponed- Y

## 2022-02-21 NOTE — TELEPHONE ENCOUNTER
Spoke to pt, informed her of sx date change, provided same information as in [facility]. Nothing further needed for upcoming surgery.

## 2022-02-22 ENCOUNTER — APPOINTMENT (OUTPATIENT)
Dept: HEMATOLOGY/ONCOLOGY | Facility: HOSPITAL | Age: 72
End: 2022-02-22
Payer: MEDICARE

## 2022-02-24 NOTE — TELEPHONE ENCOUNTER
Received a call from Charly Qureshi from [facility] inquiring if pt will need type and cross and if any units of blood will be needed on stand by for upcoming procedure.      Charly Qureshi requesting case change    Case change placed

## 2022-02-28 ENCOUNTER — LAB ENCOUNTER (OUTPATIENT)
Dept: LAB | Age: 72
End: 2022-02-28
Attending: PHYSICIAN ASSISTANT
Payer: MEDICARE

## 2022-02-28 ENCOUNTER — LABORATORY ENCOUNTER (OUTPATIENT)
Dept: LAB | Age: 72
End: 2022-02-28
Attending: NEUROLOGICAL SURGERY
Payer: MEDICARE

## 2022-02-28 DIAGNOSIS — M89.9 SKULL LESION: ICD-10-CM

## 2022-02-28 LAB
CHLORIDE SERPL-SCNC: 106 MMOL/L (ref 98–112)
POTASSIUM SERPL-SCNC: 4 MMOL/L (ref 3.5–5.1)
SODIUM SERPL-SCNC: 141 MMOL/L (ref 136–145)

## 2022-02-28 PROCEDURE — 80051 ELECTROLYTE PANEL: CPT

## 2022-02-28 PROCEDURE — 36415 COLL VENOUS BLD VENIPUNCTURE: CPT

## 2022-03-01 ENCOUNTER — ANESTHESIA EVENT (OUTPATIENT)
Dept: SURGERY | Facility: HOSPITAL | Age: 72
End: 2022-03-01
Payer: MEDICARE

## 2022-03-01 ENCOUNTER — APPOINTMENT (OUTPATIENT)
Dept: MRI IMAGING | Age: 72
End: 2022-03-01
Attending: PHYSICIAN ASSISTANT
Payer: MEDICARE

## 2022-03-01 ENCOUNTER — HOSPITAL ENCOUNTER (OUTPATIENT)
Dept: MRI IMAGING | Age: 72
Discharge: HOME | End: 2022-03-02
Attending: PHYSICIAN ASSISTANT | Admitting: NEUROLOGICAL SURGERY
Payer: MEDICARE

## 2022-03-01 ENCOUNTER — HOSPITAL ENCOUNTER (OUTPATIENT)
Dept: MRI IMAGING | Age: 72
End: 2022-03-01
Attending: PHYSICIAN ASSISTANT
Payer: MEDICARE

## 2022-03-01 DIAGNOSIS — R51.9 DAILY HEADACHE: ICD-10-CM

## 2022-03-01 DIAGNOSIS — M89.9 SKULL LESION: ICD-10-CM

## 2022-03-01 LAB — SARS-COV-2 RNA RESP QL NAA+PROBE: NOT DETECTED

## 2022-03-01 PROCEDURE — 70553 MRI BRAIN STEM W/O & W/DYE: CPT | Performed by: PHYSICIAN ASSISTANT

## 2022-03-01 PROCEDURE — A9575 INJ GADOTERATE MEGLUMI 0.1ML: HCPCS | Performed by: PHYSICIAN ASSISTANT

## 2022-03-02 ENCOUNTER — ANESTHESIA (OUTPATIENT)
Dept: SURGERY | Facility: HOSPITAL | Age: 72
End: 2022-03-02
Payer: MEDICARE

## 2022-03-02 ENCOUNTER — HOSPITAL ENCOUNTER (OUTPATIENT)
Facility: HOSPITAL | Age: 72
Setting detail: HOSPITAL OUTPATIENT SURGERY
Discharge: HOME | End: 2022-03-02
Attending: NEUROLOGICAL SURGERY | Admitting: NEUROLOGICAL SURGERY
Payer: MEDICARE

## 2022-03-02 VITALS
OXYGEN SATURATION: 96 % | SYSTOLIC BLOOD PRESSURE: 133 MMHG | TEMPERATURE: 97 F | RESPIRATION RATE: 16 BRPM | WEIGHT: 190 LBS | HEIGHT: 62 IN | DIASTOLIC BLOOD PRESSURE: 86 MMHG | BODY MASS INDEX: 34.96 KG/M2 | HEART RATE: 84 BPM

## 2022-03-02 DIAGNOSIS — M89.9 SKULL LESION: Primary | ICD-10-CM

## 2022-03-02 PROCEDURE — 88342 IMHCHEM/IMCYTCHM 1ST ANTB: CPT | Performed by: NEUROLOGICAL SURGERY

## 2022-03-02 PROCEDURE — 86920 COMPATIBILITY TEST SPIN: CPT

## 2022-03-02 PROCEDURE — 0NB10ZX EXCISION OF FRONTAL BONE, OPEN APPROACH, DIAGNOSTIC: ICD-10-PCS | Performed by: NEUROLOGICAL SURGERY

## 2022-03-02 PROCEDURE — 8E09XBZ COMPUTER ASSISTED PROCEDURE OF HEAD AND NECK REGION: ICD-10-PCS | Performed by: NEUROLOGICAL SURGERY

## 2022-03-02 PROCEDURE — 88307 TISSUE EXAM BY PATHOLOGIST: CPT | Performed by: NEUROLOGICAL SURGERY

## 2022-03-02 DEVICE — OSTEOSPONGE CRANIAL DISC 14MM: Type: IMPLANTABLE DEVICE | Site: HEAD | Status: FUNCTIONAL

## 2022-03-02 RX ORDER — SODIUM CHLORIDE 9 MG/ML
INJECTION, SOLUTION INTRAVENOUS CONTINUOUS
Status: DISCONTINUED | OUTPATIENT
Start: 2022-03-02 | End: 2022-03-02

## 2022-03-02 RX ORDER — ONDANSETRON 2 MG/ML
INJECTION INTRAMUSCULAR; INTRAVENOUS AS NEEDED
Status: DISCONTINUED | OUTPATIENT
Start: 2022-03-02 | End: 2022-03-02 | Stop reason: SURG

## 2022-03-02 RX ORDER — EPHEDRINE SULFATE 50 MG/ML
INJECTION INTRAVENOUS AS NEEDED
Status: DISCONTINUED | OUTPATIENT
Start: 2022-03-02 | End: 2022-03-02 | Stop reason: SURG

## 2022-03-02 RX ORDER — BUPIVACAINE HYDROCHLORIDE AND EPINEPHRINE 5; 5 MG/ML; UG/ML
INJECTION, SOLUTION EPIDURAL; INTRACAUDAL; PERINEURAL AS NEEDED
Status: DISCONTINUED | OUTPATIENT
Start: 2022-03-02 | End: 2022-03-02 | Stop reason: HOSPADM

## 2022-03-02 RX ORDER — PHENYLEPHRINE HCL 10 MG/ML
VIAL (ML) INJECTION AS NEEDED
Status: DISCONTINUED | OUTPATIENT
Start: 2022-03-02 | End: 2022-03-02 | Stop reason: SURG

## 2022-03-02 RX ORDER — HYDROCODONE BITARTRATE AND ACETAMINOPHEN 5; 325 MG/1; MG/1
2 TABLET ORAL AS NEEDED
Status: DISCONTINUED | OUTPATIENT
Start: 2022-03-02 | End: 2022-03-02

## 2022-03-02 RX ORDER — LIDOCAINE HYDROCHLORIDE 10 MG/ML
INJECTION, SOLUTION EPIDURAL; INFILTRATION; INTRACAUDAL; PERINEURAL AS NEEDED
Status: DISCONTINUED | OUTPATIENT
Start: 2022-03-02 | End: 2022-03-02 | Stop reason: SURG

## 2022-03-02 RX ORDER — HYDROMORPHONE HYDROCHLORIDE 1 MG/ML
INJECTION, SOLUTION INTRAMUSCULAR; INTRAVENOUS; SUBCUTANEOUS
Status: COMPLETED
Start: 2022-03-02 | End: 2022-03-02

## 2022-03-02 RX ORDER — ONDANSETRON 2 MG/ML
4 INJECTION INTRAMUSCULAR; INTRAVENOUS AS NEEDED
Status: DISCONTINUED | OUTPATIENT
Start: 2022-03-02 | End: 2022-03-02

## 2022-03-02 RX ORDER — ACETAMINOPHEN 500 MG
1000 TABLET ORAL ONCE AS NEEDED
Status: COMPLETED | OUTPATIENT
Start: 2022-03-02 | End: 2022-03-02

## 2022-03-02 RX ORDER — VANCOMYCIN HYDROCHLORIDE 1 G/20ML
INJECTION, POWDER, LYOPHILIZED, FOR SOLUTION INTRAVENOUS AS NEEDED
Status: DISCONTINUED | OUTPATIENT
Start: 2022-03-02 | End: 2022-03-02 | Stop reason: HOSPADM

## 2022-03-02 RX ORDER — SODIUM CHLORIDE 0.9 % (FLUSH) 0.9 %
SYRINGE (ML) INJECTION AS NEEDED
Status: DISCONTINUED | OUTPATIENT
Start: 2022-03-02 | End: 2022-03-02 | Stop reason: HOSPADM

## 2022-03-02 RX ORDER — SODIUM CHLORIDE, SODIUM LACTATE, POTASSIUM CHLORIDE, CALCIUM CHLORIDE 600; 310; 30; 20 MG/100ML; MG/100ML; MG/100ML; MG/100ML
INJECTION, SOLUTION INTRAVENOUS CONTINUOUS
Status: DISCONTINUED | OUTPATIENT
Start: 2022-03-02 | End: 2022-03-02

## 2022-03-02 RX ORDER — ACETAMINOPHEN 500 MG
1000 TABLET ORAL ONCE
Status: DISCONTINUED | OUTPATIENT
Start: 2022-03-02 | End: 2022-03-02 | Stop reason: HOSPADM

## 2022-03-02 RX ORDER — TRAMADOL HYDROCHLORIDE 50 MG/1
TABLET ORAL EVERY 4 HOURS PRN
Qty: 60 TABLET | Refills: 0 | Status: SHIPPED | OUTPATIENT
Start: 2022-03-02 | End: 2022-03-17

## 2022-03-02 RX ORDER — METOCLOPRAMIDE HYDROCHLORIDE 5 MG/ML
INJECTION INTRAMUSCULAR; INTRAVENOUS AS NEEDED
Status: DISCONTINUED | OUTPATIENT
Start: 2022-03-02 | End: 2022-03-02 | Stop reason: SURG

## 2022-03-02 RX ORDER — HYDROCODONE BITARTRATE AND ACETAMINOPHEN 5; 325 MG/1; MG/1
1 TABLET ORAL AS NEEDED
Status: DISCONTINUED | OUTPATIENT
Start: 2022-03-02 | End: 2022-03-02

## 2022-03-02 RX ORDER — ONDANSETRON 2 MG/ML
INJECTION INTRAMUSCULAR; INTRAVENOUS
Status: COMPLETED
Start: 2022-03-02 | End: 2022-03-02

## 2022-03-02 RX ORDER — HYDROMORPHONE HYDROCHLORIDE 1 MG/ML
0.4 INJECTION, SOLUTION INTRAMUSCULAR; INTRAVENOUS; SUBCUTANEOUS EVERY 5 MIN PRN
Status: DISCONTINUED | OUTPATIENT
Start: 2022-03-02 | End: 2022-03-02

## 2022-03-02 RX ORDER — ROCURONIUM BROMIDE 10 MG/ML
INJECTION, SOLUTION INTRAVENOUS AS NEEDED
Status: DISCONTINUED | OUTPATIENT
Start: 2022-03-02 | End: 2022-03-02 | Stop reason: SURG

## 2022-03-02 RX ORDER — SODIUM CHLORIDE, SODIUM LACTATE, POTASSIUM CHLORIDE, CALCIUM CHLORIDE 600; 310; 30; 20 MG/100ML; MG/100ML; MG/100ML; MG/100ML
INJECTION, SOLUTION INTRAVENOUS CONTINUOUS PRN
Status: DISCONTINUED | OUTPATIENT
Start: 2022-03-02 | End: 2022-03-02 | Stop reason: SURG

## 2022-03-02 RX ORDER — DEXAMETHASONE SODIUM PHOSPHATE 4 MG/ML
VIAL (ML) INJECTION AS NEEDED
Status: DISCONTINUED | OUTPATIENT
Start: 2022-03-02 | End: 2022-03-02 | Stop reason: SURG

## 2022-03-02 RX ORDER — NALOXONE HYDROCHLORIDE 0.4 MG/ML
80 INJECTION, SOLUTION INTRAMUSCULAR; INTRAVENOUS; SUBCUTANEOUS AS NEEDED
Status: DISCONTINUED | OUTPATIENT
Start: 2022-03-02 | End: 2022-03-02

## 2022-03-02 RX ADMIN — SODIUM CHLORIDE, SODIUM LACTATE, POTASSIUM CHLORIDE, CALCIUM CHLORIDE: 600; 310; 30; 20 INJECTION, SOLUTION INTRAVENOUS at 07:48:00

## 2022-03-02 RX ADMIN — PHENYLEPHRINE HCL 100 MCG: 10 MG/ML VIAL (ML) INJECTION at 08:46:00

## 2022-03-02 RX ADMIN — SODIUM CHLORIDE, SODIUM LACTATE, POTASSIUM CHLORIDE, CALCIUM CHLORIDE: 600; 310; 30; 20 INJECTION, SOLUTION INTRAVENOUS at 11:03:00

## 2022-03-02 RX ADMIN — LIDOCAINE HYDROCHLORIDE 50 MG: 10 INJECTION, SOLUTION EPIDURAL; INFILTRATION; INTRACAUDAL; PERINEURAL at 07:57:00

## 2022-03-02 RX ADMIN — METOCLOPRAMIDE HYDROCHLORIDE 10 MG: 5 INJECTION INTRAMUSCULAR; INTRAVENOUS at 08:42:00

## 2022-03-02 RX ADMIN — EPHEDRINE SULFATE 10 MG: 50 INJECTION INTRAVENOUS at 08:42:00

## 2022-03-02 RX ADMIN — ONDANSETRON 4 MG: 2 INJECTION INTRAMUSCULAR; INTRAVENOUS at 10:40:00

## 2022-03-02 RX ADMIN — ROCURONIUM BROMIDE 50 MG: 10 INJECTION, SOLUTION INTRAVENOUS at 07:57:00

## 2022-03-02 RX ADMIN — DEXAMETHASONE SODIUM PHOSPHATE 4 MG: 4 MG/ML VIAL (ML) INJECTION at 08:42:00

## 2022-03-02 NOTE — OPERATIVE REPORT
[facility]    OPERATIVE REPORT    Patient:  Boaz Aviles; YOB: 1950     CSN:  [id number]; Medical Record Number:  [id number]    Admission Date:  3/2/2022 Operation Date:  3/2/2022    . .......................... Operating Physician:  Birgit Nava MD    Surgical Assistant: Le Escort, [place]    Circulating Nurse.: Elvia Aguila, SHAWNA; Tayo Coleman, SHAWNA; Queenie Sampson, SHAWNA; Lara Murillo, SHAWNA  Scrub Person.: Chemo Polo; Mitchel YING    Pre-Operative Diagnosis: Skull lesion [M89.9]    Post-Operative Diagnosis: Same as above    Procedure: Left Frontal Craniectomy for Skull Lesion with Neuro Navigation (cranial, extradural). Anesthesia: General    EBL: 3 cc    Indications: History of breast cancer. She had headaches which led to an MRI demonstrating a left frontal skull lesion. After presentation at neuro-oncology conference biopsy of this lesion was recommended. Findings: The outer cortical mantle looked normal.  Beneath the cortical mantle the diploic layer demonstrated what appeared to be somewhat irregular thick bone rather than spongy cancellous bone. Multiple biopsies were sent. An attempt was made to scrape some of the bone and send a touch prep but this only showed calcified material.  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 Procedure: General endotracheal anesthesia in the supine position on the operating table with appropriate physiological monitoring was accomplished. Sequential compression boots were employed. She was placed in three-point pin fixation with the chin tucked slightly toward the chest to expose the left frontal region. The neuro navigation antenna was attached to the head frame and her MRI was registered for localization. External landmarks were checked for accuracy. The region of the left frontal lesion was then localized with the navigation system. The lesion was far enough forward to be outside of her hairline and because of a receding hairline a very large scalp incision would have been necessary to retract the skin and get low enough on the frontal bone for biopsy. For this reason the height and width of the lesion was carefully mapped on her forehead skin and fortunately a small skin crease crossed over the lesion and this was therefore selected as an entry site. This area was marked in the forehead was prepped and draped in sterile fashion. After prep and drape navigation again confirmed the lesion was directly under the planned incision and a forehead skin crease in the axial plane. The area was infiltrated with local anesthetic for hemostasis. Skin incision in the skin crease was taken down to pericranium slightly wider than the size lesion mapped on the navigation. Hemostasis was accomplished with bipolar cautery and a small alligator retractor. The frontalis muscle and pericranium were scraped back to expose the bone corresponding to the size of the lesion. The underlying outer cortex looked normal.  The pericranium and frontalis muscle were in then included in the retractor to expose only frontal bone. Navigation was used again directly on the frontal bone to outline the size of the lesion on MRI and a 3 mm precision round drill bit was used to drill the outer cortical bone layer to what would correspond to the depth of the spongy diploic bone. The underlying layer were quite firm with no obvious soft tumor tissue. I used a small bone osteotome to chip out several pieces superficially and then deeper near the inner cortical table as separate superficial and deep bone specimens. I attempted to scrape some of the bone with a curette and send a touch prep but pathology did not find any cellularity, only calcified tissue.   I then used the navigation to confirm I had essentially biopsied or drilled all of the abnormal area on MRI away down to thick appearing inner table of bone. I did not drill through the inner table and expose the dura. I consulted with pathology intraoperatively and they suggest that the bone I had removed with the osteotome the placed immediately in formalin for decalcification and formal staining. Closure consisted of FloSeal and thrombin Gelfoam used in the area of bone resection to obtain hemostasis. I then thought out and trimmed a bur hole bone cover to fill the defect up to the outer table of her forehead for a fairly flat cosmetic effect without having plates and screws below the thin skin of her forehead. I closed the frontalis muscle with 5-0 Vicryl suture. I closed the skin with 4-0 Vicryl suture and Dermabond. She was taken out of the three-point pin fixation, awakened in the operating room, and taken to the PACU for recovery. Plan was for same-day discharge. [place] Carroll.  Jessica Augustine, [id number] [facility]  Neurological Surgery    Co-Director  [place] [id number] Caitlyn Jama

## 2022-03-02 NOTE — ANESTHESIA PROCEDURE NOTES
Airway  Date/Time: 3/2/2022 7:59 AM  Urgency: elective      General Information and Staff    Patient location during procedure: OR  Anesthesiologist: Mary Dias DO  Performed: anesthesiologist     Indications and Patient Condition  Indications for airway management: anesthesia  Spontaneous ventilation: present  Sedation level: deep  Preoxygenated: yes  Patient position: sniffing  Mask difficulty assessment: 1 - vent by mask    Final Airway Details  Final airway type: endotracheal airway      Successful airway: ETT  Cuffed: yes   Successful intubation technique: direct laryngoscopy  Endotracheal tube insertion site: oral  Blade: Shahzad  ETT size (mm): 7.5    Cormack-Lehane Classification: grade I - full view of glottis  Placement verified by: chest auscultation and capnometry   Measured from: lips  ETT to lips (cm): 21  Number of attempts at approach: 1  Ventilation between attempts: none  Number of other approaches attempted: 0

## 2022-03-02 NOTE — BRIEF OP NOTE
Pre-Operative Diagnosis: Skull lesion [M89.9]     Post-Operative Diagnosis: Skull lesion [M89.9]      Procedure Performed:   Resection and Biopsy of Left Frontal Bone Lesion. 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 Specimen: Touch prep from scraped bone showed no definitive abnormal cells.   Permanent sections of bone sent for decalcification and definitive diagnosis     Estimated Blood Loss: Blood Output: 3 mL (3/2/2022 10:27 AM)      Laquita Solitario MD  3/2/2022  11:02 AM

## 2022-03-04 LAB — BLOOD TYPE BARCODE: 9500

## 2022-03-07 ENCOUNTER — TELEPHONE (OUTPATIENT)
Dept: NEUROLOGY | Facility: CLINIC | Age: 72
End: 2022-03-07

## 2022-03-07 NOTE — TELEPHONE ENCOUNTER
Pt wanted to call to update the Dr after having her biopsy that she has had an allergic reaction and has an appt to go see her dermatologist today.  Pt says she will call once after her visit with the dermatologist. Pt can be reached at [phone]

## 2022-03-07 NOTE — TELEPHONE ENCOUNTER
Noted patient has called with an update regarding post-op rash.  Awaiting her call after she sees her dermatologist.     Patient underwent surgery on 3/2/22 with Dr. Josr Arias:    [address] [place] [address]

## 2022-03-11 ENCOUNTER — TELEPHONE (OUTPATIENT)
Dept: HEMATOLOGY/ONCOLOGY | Facility: HOSPITAL | Age: 72
End: 2022-03-11

## 2022-03-11 NOTE — TELEPHONE ENCOUNTER
Spoke to patient. Skull biopsy negative. Patient insists that tissue was going to be sent to [facility] clinic for additional studies, I do not see any pending tests. She has upcoming appointment with neurosurgery next week and will discuss with them. For her headaches, I asked her to consider neurology appointment. She will continue with her usual follow-up with me.

## 2022-03-15 RX ORDER — ESZOPICLONE 1 MG/1
TABLET, FILM COATED ORAL
Qty: 30 TABLET | Refills: 0 | Status: SHIPPED | OUTPATIENT
Start: 2022-03-15

## 2022-03-15 NOTE — TELEPHONE ENCOUNTER
Last OV pertinent to medication 2/4/22  Last refill date: 2/4/22 #/refills: 30 w/ 0  When patient was asked to return for OV: None noted  Upcomming appt/reason: NA  Labs: None

## 2022-03-17 ENCOUNTER — OFFICE VISIT (OUTPATIENT)
Dept: SURGERY | Facility: CLINIC | Age: 72
End: 2022-03-17
Payer: MEDICARE

## 2022-03-17 ENCOUNTER — TELEPHONE (OUTPATIENT)
Dept: SURGERY | Facility: CLINIC | Age: 72
End: 2022-03-17

## 2022-03-17 VITALS
BODY MASS INDEX: 35 KG/M2 | WEIGHT: 190 LBS | OXYGEN SATURATION: 97 % | HEART RATE: 88 BPM | SYSTOLIC BLOOD PRESSURE: 110 MMHG | DIASTOLIC BLOOD PRESSURE: 80 MMHG

## 2022-03-17 DIAGNOSIS — Z98.890 STATUS POST BIOPSY: Primary | ICD-10-CM

## 2022-03-17 DIAGNOSIS — Z98.890 POST-OPERATIVE STATE: ICD-10-CM

## 2022-03-17 DIAGNOSIS — R51.9 WORSENING HEADACHES: ICD-10-CM

## 2022-03-17 PROCEDURE — 99024 POSTOP FOLLOW-UP VISIT: CPT | Performed by: PHYSICIAN ASSISTANT

## 2022-03-17 NOTE — TELEPHONE ENCOUNTER
Called and spoke to pathology lab, they do have a 24 hours turnaround time to send those out.     Will need to fax cover sheet with name of MD making request [phone], fax along with order since this is at patients request.    Routed to provider

## 2022-03-17 NOTE — PROGRESS NOTES
Last procedure: LEFT FRONTAL NEEDLE BIOPSY OF CALVARIAL LESION WITH NEURO NAVIGATION done on 03/02/22. Headaches are not as bad, she is not having them daily. No dizziness or lightheadedness. No pain.

## 2022-03-17 NOTE — TELEPHONE ENCOUNTER
NIKHIL nursing can we please reach out to pathology and request patient's recent path specimen of skull lesion be sent out to [facility] or 3rd party of choice, to confirm biopsy diagnosis? Patient is requesting.

## 2022-04-21 ENCOUNTER — OFFICE VISIT (OUTPATIENT)
Dept: SURGERY | Facility: CLINIC | Age: 72
End: 2022-04-21
Payer: MEDICARE

## 2022-04-21 VITALS
HEIGHT: 62 IN | BODY MASS INDEX: 34.96 KG/M2 | WEIGHT: 190 LBS | HEART RATE: 96 BPM | SYSTOLIC BLOOD PRESSURE: 120 MMHG | DIASTOLIC BLOOD PRESSURE: 90 MMHG

## 2022-04-21 DIAGNOSIS — Z09: ICD-10-CM

## 2022-04-21 DIAGNOSIS — M89.9 SKULL LESION: Primary | ICD-10-CM

## 2022-04-21 PROCEDURE — 99024 POSTOP FOLLOW-UP VISIT: CPT | Performed by: NEUROLOGICAL SURGERY

## 2022-04-21 NOTE — PROGRESS NOTES
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 Her chronic headaches are slightly improved after removal of this lesion in both intensity and frequency. [facility] report which shows benign bone sclerosis and probable benign hemangioma. Interval Examination: Left frontal incision is well-healed. There is no palpable defect. She has an excellent cosmetic result. Imaging: No new imaging. [facility] pathology report consistent with bone sclerosis and benign hemangioma. Assessment: Curative resection of benign left frontal hemangioma. Plan: No need for future follow-up. Total face to face time was 10 minutes, more than 50% of the time was spent in counseling and/or coordination of care related to reviewed pathology from [facility] [employer] [facility]. Gave her approval for dental work. Gave her approval for treatment of skin lesions. Francie Santamaria.  Matt Teague, [address] [facility]  Neurological Surgery    Co-Director  [place] [address] [address]

## 2022-04-25 ENCOUNTER — OFFICE VISIT (OUTPATIENT)
Dept: NEUROLOGY | Facility: CLINIC | Age: 72
End: 2022-04-25
Payer: MEDICARE

## 2022-04-25 VITALS
DIASTOLIC BLOOD PRESSURE: 82 MMHG | BODY MASS INDEX: 34.96 KG/M2 | HEART RATE: 93 BPM | OXYGEN SATURATION: 98 % | WEIGHT: 190 LBS | SYSTOLIC BLOOD PRESSURE: 130 MMHG | HEIGHT: 62 IN | RESPIRATION RATE: 16 BRPM

## 2022-04-25 DIAGNOSIS — R51.9 NOCTURNAL HEADACHES: Primary | ICD-10-CM

## 2022-04-25 PROCEDURE — 99204 OFFICE O/P NEW MOD 45 MIN: CPT | Performed by: HOSPITALIST

## 2022-04-25 NOTE — PROGRESS NOTES
New Patient for Headaches- Patient states she has been experiencing headaches since August. Patient states her headaches are almost daily. Patient states head pain alternates.

## 2022-04-28 ENCOUNTER — OFFICE VISIT (OUTPATIENT)
Dept: OTOLARYNGOLOGY | Facility: CLINIC | Age: 72
End: 2022-04-28
Payer: MEDICARE

## 2022-04-28 VITALS — BODY MASS INDEX: 33.66 KG/M2 | HEIGHT: 63 IN | WEIGHT: 190 LBS

## 2022-04-28 DIAGNOSIS — R51.9 FREQUENT HEADACHES: Primary | ICD-10-CM

## 2022-04-28 RX ORDER — FLUTICASONE PROPIONATE 50 MCG
1 SPRAY, SUSPENSION (ML) NASAL 2 TIMES DAILY
Qty: 16 G | Refills: 3 | Status: SHIPPED | OUTPATIENT
Start: 2022-04-28

## 2022-05-04 ENCOUNTER — LAB ENCOUNTER (OUTPATIENT)
Dept: LAB | Age: 72
End: 2022-05-04
Attending: HOSPITALIST
Payer: MEDICARE

## 2022-05-04 DIAGNOSIS — R51.9 NOCTURNAL HEADACHES: ICD-10-CM

## 2022-05-04 LAB
CRP SERPL-MCNC: 0.52 MG/DL (ref ?–0.3)
ERYTHROCYTE [SEDIMENTATION RATE] IN BLOOD: 18 MM/HR

## 2022-05-04 PROCEDURE — 86140 C-REACTIVE PROTEIN: CPT

## 2022-05-04 PROCEDURE — 85652 RBC SED RATE AUTOMATED: CPT

## 2022-05-04 PROCEDURE — 36415 COLL VENOUS BLD VENIPUNCTURE: CPT

## 2022-05-06 NOTE — PROGRESS NOTES
Dx: Trochanteric bursitis of right hip (M70.61)            Insurance (Authorized # of Visits):  8           Authorizing Physician: Dr. Espinoza Hanna  Next MD visit: none scheduled  Fall Risk: standard         Precautions: tape allergy, cancer, osteoporosi
n/a

## 2022-05-26 ENCOUNTER — TELEPHONE (OUTPATIENT)
Dept: NEUROLOGY | Facility: CLINIC | Age: 72
End: 2022-05-26

## 2022-05-26 DIAGNOSIS — R51.9 NOCTURNAL HEADACHES: Primary | ICD-10-CM

## 2022-05-26 DIAGNOSIS — R79.82 ELEVATED C-REACTIVE PROTEIN (CRP): ICD-10-CM

## 2022-05-26 NOTE — TELEPHONE ENCOUNTER
Spoke with patient over the phone. Is doing better. Headaches have decreased in frequency. Is taking the riboflavin. CRP was mildly elevated. We will repeat level. Informed patient that I am sending order.   She was instructed to reach out to us if symptoms worsen and/or persist for another 3 to 4 months

## 2022-05-28 DIAGNOSIS — E03.9 ACQUIRED HYPOTHYROIDISM: ICD-10-CM

## 2022-05-28 DIAGNOSIS — I10 ESSENTIAL HYPERTENSION: ICD-10-CM

## 2022-05-31 DIAGNOSIS — I10 ESSENTIAL HYPERTENSION: ICD-10-CM

## 2022-05-31 DIAGNOSIS — E03.9 ACQUIRED HYPOTHYROIDISM: ICD-10-CM

## 2022-05-31 RX ORDER — LISINOPRIL 10 MG/1
TABLET ORAL
Qty: 30 TABLET | Refills: 0 | Status: SHIPPED | OUTPATIENT
Start: 2022-05-31 | End: 2022-06-21

## 2022-05-31 RX ORDER — FUROSEMIDE 40 MG/1
TABLET ORAL
Qty: 15 TABLET | Refills: 0 | Status: SHIPPED | OUTPATIENT
Start: 2022-05-31 | End: 2022-06-21

## 2022-05-31 RX ORDER — LEVOTHYROXINE SODIUM 0.07 MG/1
TABLET ORAL
Qty: 30 TABLET | Refills: 0 | Status: SHIPPED | OUTPATIENT
Start: 2022-05-31 | End: 2022-06-21

## 2022-06-02 RX ORDER — LISINOPRIL 10 MG/1
TABLET ORAL
Qty: 90 TABLET | Refills: 0 | OUTPATIENT
Start: 2022-06-02

## 2022-06-02 RX ORDER — LEVOTHYROXINE SODIUM 0.07 MG/1
TABLET ORAL
Qty: 90 TABLET | Refills: 0 | OUTPATIENT
Start: 2022-06-02

## 2022-06-02 RX ORDER — FUROSEMIDE 40 MG/1
TABLET ORAL
Qty: 45 TABLET | Refills: 0 | OUTPATIENT
Start: 2022-06-02

## 2022-06-03 ENCOUNTER — LAB ENCOUNTER (OUTPATIENT)
Dept: LAB | Age: 72
End: 2022-06-03
Attending: INTERNAL MEDICINE
Payer: MEDICARE

## 2022-06-03 ENCOUNTER — HOSPITAL ENCOUNTER (OUTPATIENT)
Dept: CV DIAGNOSTICS | Age: 72
Discharge: HOME | End: 2022-06-03
Attending: INTERNAL MEDICINE
Payer: MEDICARE

## 2022-06-03 DIAGNOSIS — R79.82 ELEVATED C-REACTIVE PROTEIN (CRP): ICD-10-CM

## 2022-06-03 DIAGNOSIS — I10 HTN (HYPERTENSION): ICD-10-CM

## 2022-06-03 DIAGNOSIS — N18.31 CHRONIC KIDNEY DISEASE (CKD) STAGE G3A/A1, MODERATELY DECREASED GLOMERULAR FILTRATION RATE (GFR) BETWEEN 45-59 ML/MIN/1.73 SQUARE METER AND ALBUMINURIA CREATININE RATIO LESS THAN 30 MG/G (HCC): ICD-10-CM

## 2022-06-03 DIAGNOSIS — I10 ESSENTIAL HYPERTENSION, MALIGNANT: ICD-10-CM

## 2022-06-03 DIAGNOSIS — E78.5 HYPERLIPEMIA: ICD-10-CM

## 2022-06-03 DIAGNOSIS — E55.9 AVITAMINOSIS D: ICD-10-CM

## 2022-06-03 DIAGNOSIS — I51.9 MYXEDEMA HEART DISEASE: Primary | ICD-10-CM

## 2022-06-03 DIAGNOSIS — E03.9 MYXEDEMA HEART DISEASE: Primary | ICD-10-CM

## 2022-06-03 DIAGNOSIS — R51.9 NOCTURNAL HEADACHES: ICD-10-CM

## 2022-06-03 DIAGNOSIS — Z98.890 PERSONAL HISTORY OF SURGERY TO HEART AND GREAT VESSELS, PRESENTING HAZARDS TO HEALTH: ICD-10-CM

## 2022-06-03 DIAGNOSIS — G47.33 OBSTRUCTIVE SLEEP APNEA (ADULT) (PEDIATRIC): ICD-10-CM

## 2022-06-03 DIAGNOSIS — I87.2 PERIPHERAL VENOUS INSUFFICIENCY: ICD-10-CM

## 2022-06-03 DIAGNOSIS — R53.83 FATIGUE: ICD-10-CM

## 2022-06-03 LAB — CRP SERPL-MCNC: 0.34 MG/DL (ref ?–0.3)

## 2022-06-03 PROCEDURE — 93306 TTE W/DOPPLER COMPLETE: CPT | Performed by: INTERNAL MEDICINE

## 2022-06-03 PROCEDURE — 36415 COLL VENOUS BLD VENIPUNCTURE: CPT

## 2022-06-03 PROCEDURE — 86140 C-REACTIVE PROTEIN: CPT

## 2022-06-07 ENCOUNTER — LAB ENCOUNTER (OUTPATIENT)
Dept: LAB | Age: 72
End: 2022-06-07
Attending: INTERNAL MEDICINE
Payer: MEDICARE

## 2022-06-07 DIAGNOSIS — E78.5 HYPERLIPEMIA: ICD-10-CM

## 2022-06-07 DIAGNOSIS — I87.2 PERIPHERAL VENOUS INSUFFICIENCY: ICD-10-CM

## 2022-06-07 DIAGNOSIS — Z98.890 PERSONAL HISTORY OF SURGERY TO HEART AND GREAT VESSELS, PRESENTING HAZARDS TO HEALTH: ICD-10-CM

## 2022-06-07 DIAGNOSIS — I10 ESSENTIAL HYPERTENSION, MALIGNANT: ICD-10-CM

## 2022-06-07 DIAGNOSIS — I51.9 MYXEDEMA HEART DISEASE: ICD-10-CM

## 2022-06-07 DIAGNOSIS — N18.31 CHRONIC KIDNEY DISEASE (CKD) STAGE G3A/A1, MODERATELY DECREASED GLOMERULAR FILTRATION RATE (GFR) BETWEEN 45-59 ML/MIN/1.73 SQUARE METER AND ALBUMINURIA CREATININE RATIO LESS THAN 30 MG/G (HCC): ICD-10-CM

## 2022-06-07 DIAGNOSIS — E55.9 AVITAMINOSIS D: ICD-10-CM

## 2022-06-07 DIAGNOSIS — G47.33 OBSTRUCTIVE SLEEP APNEA (ADULT) (PEDIATRIC): ICD-10-CM

## 2022-06-07 DIAGNOSIS — R53.83 FATIGUE: ICD-10-CM

## 2022-06-07 DIAGNOSIS — E03.9 MYXEDEMA HEART DISEASE: ICD-10-CM

## 2022-06-07 LAB
BASOPHILS # BLD AUTO: 0.03 X10(3) UL (ref 0–0.2)
BASOPHILS NFR BLD AUTO: 0.5 %
CHOLEST SERPL-MCNC: 203 MG/DL (ref ?–200)
EOSINOPHIL # BLD AUTO: 0.18 X10(3) UL (ref 0–0.7)
EOSINOPHIL NFR BLD AUTO: 3.1 %
ERYTHROCYTE [DISTWIDTH] IN BLOOD BY AUTOMATED COUNT: 13.9 %
EST. AVERAGE GLUCOSE BLD GHB EST-MCNC: 126 MG/DL (ref 68–126)
FASTING PATIENT LIPID ANSWER: YES
HBA1C MFR BLD: 6 % (ref ?–5.7)
HCT VFR BLD AUTO: 39.9 %
HDLC SERPL-MCNC: 62 MG/DL (ref 40–59)
HGB BLD-MCNC: 12.9 G/DL
IMM GRANULOCYTES # BLD AUTO: 0.01 X10(3) UL (ref 0–1)
IMM GRANULOCYTES NFR BLD: 0.2 %
LDLC SERPL CALC-MCNC: 125 MG/DL (ref ?–100)
LYMPHOCYTES # BLD AUTO: 2.01 X10(3) UL (ref 1–4)
LYMPHOCYTES NFR BLD AUTO: 34.1 %
MCH RBC QN AUTO: 30.1 PG (ref 26–34)
MCHC RBC AUTO-ENTMCNC: 32.3 G/DL (ref 31–37)
MCV RBC AUTO: 93.2 FL
MONOCYTES # BLD AUTO: 0.63 X10(3) UL (ref 0.1–1)
MONOCYTES NFR BLD AUTO: 10.7 %
NEUTROPHILS # BLD AUTO: 3.03 X10 (3) UL (ref 1.5–7.7)
NEUTROPHILS # BLD AUTO: 3.03 X10(3) UL (ref 1.5–7.7)
NEUTROPHILS NFR BLD AUTO: 51.4 %
NONHDLC SERPL-MCNC: 141 MG/DL (ref ?–130)
NT-PROBNP SERPL-MCNC: 79 PG/ML (ref ?–125)
PLATELET # BLD AUTO: 256 10(3)UL (ref 150–450)
RBC # BLD AUTO: 4.28 X10(6)UL
TRIGL SERPL-MCNC: 88 MG/DL (ref 30–149)
TSI SER-ACNC: 1.46 MIU/ML (ref 0.36–3.74)
VIT D+METAB SERPL-MCNC: 57.6 NG/ML (ref 30–100)
VLDLC SERPL CALC-MCNC: 16 MG/DL (ref 0–30)
WBC # BLD AUTO: 5.9 X10(3) UL (ref 4–11)

## 2022-06-07 PROCEDURE — 80061 LIPID PANEL: CPT

## 2022-06-07 PROCEDURE — 82306 VITAMIN D 25 HYDROXY: CPT

## 2022-06-07 PROCEDURE — 83880 ASSAY OF NATRIURETIC PEPTIDE: CPT

## 2022-06-07 PROCEDURE — 84443 ASSAY THYROID STIM HORMONE: CPT

## 2022-06-07 PROCEDURE — 85025 COMPLETE CBC W/AUTO DIFF WBC: CPT

## 2022-06-07 PROCEDURE — 36415 COLL VENOUS BLD VENIPUNCTURE: CPT

## 2022-06-07 PROCEDURE — 83036 HEMOGLOBIN GLYCOSYLATED A1C: CPT

## 2022-06-21 ENCOUNTER — HOSPITAL ENCOUNTER (OUTPATIENT)
Dept: GENERAL RADIOLOGY | Age: 72
Discharge: HOME | End: 2022-06-21
Attending: INTERNAL MEDICINE
Payer: MEDICARE

## 2022-06-21 ENCOUNTER — OFFICE VISIT (OUTPATIENT)
Dept: INTERNAL MEDICINE CLINIC | Facility: CLINIC | Age: 72
End: 2022-06-21
Payer: MEDICARE

## 2022-06-21 ENCOUNTER — TELEPHONE (OUTPATIENT)
Dept: INTERNAL MEDICINE CLINIC | Facility: CLINIC | Age: 72
End: 2022-06-21

## 2022-06-21 VITALS
SYSTOLIC BLOOD PRESSURE: 124 MMHG | BODY MASS INDEX: 33.19 KG/M2 | OXYGEN SATURATION: 98 % | HEIGHT: 63 IN | HEART RATE: 88 BPM | TEMPERATURE: 98 F | DIASTOLIC BLOOD PRESSURE: 74 MMHG | WEIGHT: 187.31 LBS | RESPIRATION RATE: 16 BRPM

## 2022-06-21 DIAGNOSIS — I87.2 VENOUS INSUFFICIENCY: ICD-10-CM

## 2022-06-21 DIAGNOSIS — M25.551 RIGHT HIP PAIN: Primary | ICD-10-CM

## 2022-06-21 DIAGNOSIS — I10 PRIMARY HYPERTENSION: ICD-10-CM

## 2022-06-21 DIAGNOSIS — I10 ESSENTIAL HYPERTENSION: ICD-10-CM

## 2022-06-21 DIAGNOSIS — E03.9 ACQUIRED HYPOTHYROIDISM: ICD-10-CM

## 2022-06-21 DIAGNOSIS — N18.31 STAGE 3A CHRONIC KIDNEY DISEASE (HCC): ICD-10-CM

## 2022-06-21 DIAGNOSIS — M25.551 RIGHT HIP PAIN: ICD-10-CM

## 2022-06-21 PROBLEM — R73.03 PREDIABETES: Status: ACTIVE | Noted: 2022-06-21

## 2022-06-21 PROBLEM — M16.11 ARTHRITIS OF RIGHT HIP: Status: ACTIVE | Noted: 2022-06-21

## 2022-06-21 PROCEDURE — 73502 X-RAY EXAM HIP UNI 2-3 VIEWS: CPT | Performed by: INTERNAL MEDICINE

## 2022-06-21 PROCEDURE — 99214 OFFICE O/P EST MOD 30 MIN: CPT | Performed by: INTERNAL MEDICINE

## 2022-06-21 RX ORDER — FUROSEMIDE 40 MG/1
40 TABLET ORAL EVERY OTHER DAY
Qty: 45 TABLET | Refills: 1 | Status: SHIPPED | OUTPATIENT
Start: 2022-06-21

## 2022-06-21 RX ORDER — BLACK COHOSH ROOT EXTRACT 80 MG
1 CAPSULE ORAL DAILY
COMMUNITY

## 2022-06-21 RX ORDER — LISINOPRIL 10 MG/1
10 TABLET ORAL DAILY
Qty: 90 TABLET | Refills: 1 | Status: SHIPPED | OUTPATIENT
Start: 2022-06-21

## 2022-06-21 RX ORDER — LEVOTHYROXINE SODIUM 0.07 MG/1
75 TABLET ORAL
Qty: 90 TABLET | Refills: 1 | Status: SHIPPED | OUTPATIENT
Start: 2022-06-21

## 2022-06-21 RX ORDER — FLUTICASONE PROPIONATE 50 MCG
1 SPRAY, SUSPENSION (ML) NASAL 2 TIMES DAILY
Qty: 3 EACH | Refills: 3 | Status: SHIPPED | OUTPATIENT
Start: 2022-06-21

## 2022-06-22 DIAGNOSIS — I10 ESSENTIAL HYPERTENSION: ICD-10-CM

## 2022-06-22 RX ORDER — POTASSIUM CHLORIDE 20 MEQ/1
TABLET, EXTENDED RELEASE ORAL
Qty: 45 TABLET | Refills: 1 | Status: SHIPPED | OUTPATIENT
Start: 2022-06-22

## 2022-06-22 NOTE — TELEPHONE ENCOUNTER
Last OV relevant to medication: 6/21/22  Last refill date: 12/2/21  45   #/refills: 1  When pt was asked to return for OV: 6 months   Upcoming appt/reason:   Future Appointments   Date Time Provider [place]   6/27/2022  4:30 PM Luciana Patiño MD [facility]       Was pt informed of any over due labs: n/a   Lab Results   Component Value Date    GLU 93 02/07/2022    BUN 23 (H) 02/07/2022    BUNCREA 25.3 (H) 10/12/2020    CREATSERUM 0.83 02/07/2022    ANIONGAP 9 02/07/2022    GFR 55 (L) 08/21/2017    GFRNAA 72 02/07/2022    GFRAA 83 02/07/2022    CA 9.5 02/07/2022    OSMOCALC 293 02/07/2022    ALKPHO 65 02/07/2022    AST 19 02/07/2022    ALT 30 02/07/2022    BILT 0.4 02/07/2022    TP 7.1 02/07/2022    ALB 3.9 02/07/2022    GLOBULIN 3.2 02/07/2022     02/28/2022    K 4.0 02/28/2022     02/28/2022    CO2 29.0 02/28/2022

## 2022-06-23 ENCOUNTER — TELEPHONE (OUTPATIENT)
Dept: ORTHOPEDICS CLINIC | Facility: CLINIC | Age: 72
End: 2022-06-23

## 2022-06-24 DIAGNOSIS — F51.01 PRIMARY INSOMNIA: ICD-10-CM

## 2022-06-27 ENCOUNTER — OFFICE VISIT (OUTPATIENT)
Facility: LOCATION | Age: 72
End: 2022-06-27
Payer: MEDICARE

## 2022-06-27 VITALS
HEART RATE: 82 BPM | SYSTOLIC BLOOD PRESSURE: 117 MMHG | TEMPERATURE: 98 F | DIASTOLIC BLOOD PRESSURE: 74 MMHG | WEIGHT: 187 LBS | HEIGHT: 63 IN | BODY MASS INDEX: 33.13 KG/M2

## 2022-06-27 DIAGNOSIS — Z17.0 MALIGNANT NEOPLASM OF CENTRAL PORTION OF RIGHT BREAST IN FEMALE, ESTROGEN RECEPTOR POSITIVE (HCC): Primary | ICD-10-CM

## 2022-06-27 DIAGNOSIS — Z90.13 S/P BILATERAL MASTECTOMY: ICD-10-CM

## 2022-06-27 DIAGNOSIS — C50.111 MALIGNANT NEOPLASM OF CENTRAL PORTION OF RIGHT BREAST IN FEMALE, ESTROGEN RECEPTOR POSITIVE (HCC): Primary | ICD-10-CM

## 2022-06-27 RX ORDER — ESZOPICLONE 1 MG/1
TABLET, FILM COATED ORAL
Qty: 30 TABLET | Refills: 1 | Status: SHIPPED | OUTPATIENT
Start: 2022-06-27

## 2022-06-27 NOTE — TELEPHONE ENCOUNTER
Last OV relevant to medication: 12/02/2021 has been seen more recently   Last refill date: 03/15/2022   #/refills: 30-0  When pt was asked to return for OV: Return in about 6 months (around 12/21/2022) for AWV when due.   Upcoming appt/reason: n/a  Was pt informed of any over due labs: n/a

## 2022-06-29 ENCOUNTER — TELEPHONE (OUTPATIENT)
Dept: SURGERY | Facility: CLINIC | Age: 72
End: 2022-06-29

## 2022-06-29 NOTE — TELEPHONE ENCOUNTER
Pt calling stating she hit her head on basement stairs a week ago where the surgery was and theres an indentation. Would like to speak to veronica about this. Please advise.

## 2022-06-30 ENCOUNTER — OFFICE VISIT (OUTPATIENT)
Dept: SURGERY | Facility: CLINIC | Age: 72
End: 2022-06-30
Payer: MEDICARE

## 2022-06-30 VITALS — HEART RATE: 80 BPM | DIASTOLIC BLOOD PRESSURE: 68 MMHG | SYSTOLIC BLOOD PRESSURE: 100 MMHG | OXYGEN SATURATION: 99 %

## 2022-06-30 DIAGNOSIS — M89.9 SKULL LESION: Primary | ICD-10-CM

## 2022-06-30 PROCEDURE — 99212 OFFICE O/P EST SF 10 MIN: CPT | Performed by: NEUROLOGICAL SURGERY

## 2022-07-05 ENCOUNTER — OFFICE VISIT (OUTPATIENT)
Facility: LOCATION | Age: 72
End: 2022-07-05
Payer: MEDICARE

## 2022-07-05 VITALS
BODY MASS INDEX: 32.92 KG/M2 | DIASTOLIC BLOOD PRESSURE: 78 MMHG | WEIGHT: 185.81 LBS | HEART RATE: 79 BPM | TEMPERATURE: 99 F | SYSTOLIC BLOOD PRESSURE: 107 MMHG | HEIGHT: 63 IN

## 2022-07-05 DIAGNOSIS — M79.605 PAIN IN BOTH LOWER EXTREMITIES: Primary | ICD-10-CM

## 2022-07-05 DIAGNOSIS — M79.604 PAIN IN BOTH LOWER EXTREMITIES: Primary | ICD-10-CM

## 2022-07-05 DIAGNOSIS — I83.899 VARICOSE VEINS WITH COMPLICATIONS: ICD-10-CM

## 2022-07-05 DIAGNOSIS — I83.819 VARICOSE VEINS WITH PAIN: ICD-10-CM

## 2022-07-05 PROCEDURE — 99214 OFFICE O/P EST MOD 30 MIN: CPT | Performed by: SURGERY

## 2022-07-19 NOTE — PROGRESS NOTES
Neurological Surgery Outpatient Clinic    [facility]  6/30/2022    Diagnosis: Benign intraosseous hemangioma. 3/2/2022 resection of left frontal skull lesion    Chief complaint: Patient had recent fall and now has slight dent at the site of her previous meningioma resection. Interval History: Patient recently fell and since then feels she has a small dent in her left frontal resection area. She had no bleeding or skin contusion. She had no loss of consciousness. Interval Examination: On exam her incision is well-healed and well hidden in a forehead crease. There is slight depression at the site of her meningioma resection. Imaging: No new imaging. Labs: No new labs. Assessment: Slight cosmetic indentation at site of previous hemangioma resection. She may have compacted the bone cement when she fell. Plan: I offered her visit with plastic surgery for some kind of feeling procedure. I assured her there was no long-term consequence of her fall. Total face to face time was 10 minutes, more than 50% of the time was spent in counseling and/or coordination of care related to reassurance. Don Narayan.  Myra Cruz, [address] [facility]  Neurological Surgery    Co-Director  [facility]  [address]

## 2022-07-30 ENCOUNTER — MED REC SCAN ONLY (OUTPATIENT)
Dept: INTERNAL MEDICINE CLINIC | Facility: CLINIC | Age: 72
End: 2022-07-30

## 2022-08-24 ENCOUNTER — HOSPITAL ENCOUNTER (OUTPATIENT)
Dept: ULTRASOUND IMAGING | Age: 72
Discharge: HOME | End: 2022-08-24
Attending: SURGERY
Payer: MEDICARE

## 2022-08-24 DIAGNOSIS — M79.605 PAIN IN BOTH LOWER EXTREMITIES: ICD-10-CM

## 2022-08-24 DIAGNOSIS — M79.604 PAIN IN BOTH LOWER EXTREMITIES: ICD-10-CM

## 2022-08-24 PROCEDURE — 93970 EXTREMITY STUDY: CPT | Performed by: SURGERY

## 2022-08-31 ENCOUNTER — OFFICE VISIT (OUTPATIENT)
Dept: ORTHOPEDICS CLINIC | Facility: CLINIC | Age: 72
End: 2022-08-31
Payer: MEDICARE

## 2022-08-31 ENCOUNTER — HOSPITAL ENCOUNTER (OUTPATIENT)
Dept: CT IMAGING | Age: 72
Discharge: HOME | End: 2022-08-31
Attending: INTERNAL MEDICINE

## 2022-08-31 VITALS — WEIGHT: 187.38 LBS | OXYGEN SATURATION: 98 % | BODY MASS INDEX: 33.2 KG/M2 | HEART RATE: 88 BPM | HEIGHT: 63 IN

## 2022-08-31 DIAGNOSIS — Z13.6 SCREENING FOR CARDIOVASCULAR CONDITION: ICD-10-CM

## 2022-08-31 DIAGNOSIS — M25.551 HIP PAIN, RIGHT: Primary | ICD-10-CM

## 2022-08-31 DIAGNOSIS — M16.11 PRIMARY OSTEOARTHRITIS OF RIGHT HIP: ICD-10-CM

## 2022-08-31 PROCEDURE — 99203 OFFICE O/P NEW LOW 30 MIN: CPT | Performed by: ORTHOPAEDIC SURGERY

## 2022-08-31 PROCEDURE — 20611 DRAIN/INJ JOINT/BURSA W/US: CPT | Performed by: ORTHOPAEDIC SURGERY

## 2022-08-31 RX ORDER — TRIAMCINOLONE ACETONIDE 40 MG/ML
40 INJECTION, SUSPENSION INTRA-ARTICULAR; INTRAMUSCULAR ONCE
Status: COMPLETED | OUTPATIENT
Start: 2022-08-31 | End: 2022-08-31

## 2022-08-31 RX ADMIN — TRIAMCINOLONE ACETONIDE 40 MG: 40 INJECTION, SUSPENSION INTRA-ARTICULAR; INTRAMUSCULAR at 14:37:00

## 2022-08-31 NOTE — PROCEDURES
After informed consent, the patient's right hip was marked, prepped with topical antiseptic, and locally anesthetized with skin refrigerant followed by injection of 1% lidocaine to create a skin wheal anteriorly at an insertion site a few fingerbreadths lateral to the femoral pulse, along the trajectory of the femoral neck. Next, the area was re-prepped with topical antiseptic, and ultrasound guidance was performed to direct a 20 gauge spinal needle into the hip joint at the junction of the femoral head and neck, after which a mixture of 1mL 40mg/mL Kenalog, 2mL 1% lidocaine and 2mL 0.5% marcaine was injected while visualizing the fluid under ultrasound. Confirmatory imaging was saved to the patient's chart. A band-aid was applied. The patient tolerated the procedure well.     Cortez Grossman MD, [address] [facility]  Phone [phone]  Fax [phone]

## 2022-08-31 NOTE — PROGRESS NOTES
Date of Service 8/31/2022    Ruth Cline  Date of Birth 7/19/1950    Patient Age: 81year old    PCP: Michaela Linton MD  [address] [id number]    Consult Type  Type Scan/Screening: Heart Scan  Preliminary Heart Scan Score: 0                Body Mass Index  There is no height or weight on file to calculate BMI. Lipid Profile  Cholesterol: 203, done on 6/7/2022. HDL Cholesterol: 62, done on 6/7/2022. LDL Cholesterol: 125, done on 6/7/2022. TriGlycerides 88, done on 6/7/2022. Discussed trying to lower LDL to less than 100. Decreasing the saturated fats in diet. She is not on cholesterol med. Nurse Review  Risk factor information and results reviewed with Nurse: Yes     /78 on medication. Recommended Follow Up:  Consult your physician regarding[de-identified] Final Heart Scan Report; Discuss potential for Incidental Finding    No data recorded      Recommendations for Change:  Nutrition Changes: Low Saturated Fat;Low Fat Dairy; Low Salt Eating; Increase Fiber  Cholesterol Modification (goal of therapy depends upon your risk): Decrease LDL (Lousy/Bad) Ideal <100  Exercise: Enhance Current Program  Smoking Cessation: No Change Needed  Weight Management: Decrease Current Weight     Repeat Heart Scan: 5 years if Calcium Score is 0. 0; Discuss with your Physician          Anupam Recommended Resources:  Recommended Resources: Upcoming Classes, Medical Services and Health Library [email or web address]. [email or web address]. Enma Borja RN        Please Contact the Nurse Heart Line with any Questions or Concerns [phone].

## 2022-10-04 NOTE — TELEPHONE ENCOUNTER
Never received records  Faxed 2nd record request [phone] confirmation received  Awaiting records  In triage medical record request folder

## 2022-10-31 ENCOUNTER — OFFICE VISIT (OUTPATIENT)
Dept: HEMATOLOGY/ONCOLOGY | Age: 72
End: 2022-10-31
Attending: INTERNAL MEDICINE
Payer: MEDICARE

## 2022-10-31 VITALS
SYSTOLIC BLOOD PRESSURE: 118 MMHG | TEMPERATURE: 98 F | WEIGHT: 186.81 LBS | DIASTOLIC BLOOD PRESSURE: 86 MMHG | HEART RATE: 66 BPM | OXYGEN SATURATION: 100 % | HEIGHT: 62.99 IN | BODY MASS INDEX: 33.1 KG/M2

## 2022-10-31 DIAGNOSIS — M85.80 OSTEOPENIA, UNSPECIFIED LOCATION: ICD-10-CM

## 2022-10-31 DIAGNOSIS — C50.111 MALIGNANT NEOPLASM OF CENTRAL PORTION OF RIGHT BREAST IN FEMALE, ESTROGEN RECEPTOR POSITIVE (HCC): Primary | ICD-10-CM

## 2022-10-31 DIAGNOSIS — Z17.0 MALIGNANT NEOPLASM OF CENTRAL PORTION OF RIGHT BREAST IN FEMALE, ESTROGEN RECEPTOR POSITIVE (HCC): Primary | ICD-10-CM

## 2022-10-31 DIAGNOSIS — M89.9 LYTIC BONE LESIONS ON XRAY: ICD-10-CM

## 2022-10-31 PROCEDURE — 99213 OFFICE O/P EST LOW 20 MIN: CPT | Performed by: INTERNAL MEDICINE

## 2022-10-31 NOTE — PROGRESS NOTES
Education Record    Learner:  Patient    Disease / Diagnosis:hx  Breast cancer. Barriers / Limitations:  None   Comments:    Method:  Discussion   Comments:    General Topics:  Plan of care reviewed   Comments:    Outcome:  Shows understanding   Comments:    Biopsy in March with Dr Jessica Augustine. Hit her head in the area of surgery 3 times. Having some HA's, does not take medicine, because not that severe. Reports having headaches 1-2 times per week.

## 2022-11-04 ENCOUNTER — TELEPHONE (OUTPATIENT)
Dept: PHYSICAL THERAPY | Age: 72
End: 2022-11-04

## 2022-11-07 ENCOUNTER — ORDER TRANSCRIPTION (OUTPATIENT)
Dept: PHYSICAL THERAPY | Facility: HOSPITAL | Age: 72
End: 2022-11-07

## 2022-11-07 DIAGNOSIS — M72.2 PLANTAR FASCIITIS: Primary | ICD-10-CM

## 2022-11-08 ENCOUNTER — TELEPHONE (OUTPATIENT)
Dept: PHYSICAL THERAPY | Age: 72
End: 2022-11-08

## 2022-11-08 ENCOUNTER — TELEPHONE (OUTPATIENT)
Dept: PHYSICAL THERAPY | Facility: HOSPITAL | Age: 72
End: 2022-11-08

## 2022-11-08 ENCOUNTER — OFFICE VISIT (OUTPATIENT)
Dept: INTERNAL MEDICINE CLINIC | Facility: CLINIC | Age: 72
End: 2022-11-08
Payer: MEDICARE

## 2022-11-08 ENCOUNTER — OFFICE VISIT (OUTPATIENT)
Dept: PHYSICAL THERAPY | Age: 72
End: 2022-11-08
Attending: NURSE PRACTITIONER
Payer: MEDICARE

## 2022-11-08 VITALS
HEIGHT: 62.99 IN | BODY MASS INDEX: 33.2 KG/M2 | RESPIRATION RATE: 16 BRPM | DIASTOLIC BLOOD PRESSURE: 78 MMHG | SYSTOLIC BLOOD PRESSURE: 120 MMHG | HEART RATE: 81 BPM | OXYGEN SATURATION: 98 % | WEIGHT: 187.38 LBS | TEMPERATURE: 98 F

## 2022-11-08 DIAGNOSIS — M72.2 PLANTAR FASCIITIS OF LEFT FOOT: Primary | ICD-10-CM

## 2022-11-08 DIAGNOSIS — M67.88 ACHILLES TENDINOSIS OF LEFT LOWER EXTREMITY: ICD-10-CM

## 2022-11-08 DIAGNOSIS — M72.2 PLANTAR FASCIITIS OF LEFT FOOT: ICD-10-CM

## 2022-11-08 PROCEDURE — 99213 OFFICE O/P EST LOW 20 MIN: CPT | Performed by: NURSE PRACTITIONER

## 2022-11-08 PROCEDURE — 97110 THERAPEUTIC EXERCISES: CPT

## 2022-11-08 PROCEDURE — 97163 PT EVAL HIGH COMPLEX 45 MIN: CPT

## 2022-11-08 NOTE — TELEPHONE ENCOUNTER
Received email from pt. Offered pt my 5:30 spot tonight and asked that she return call from [facility] central scheduling and left number.

## 2022-11-10 ENCOUNTER — OFFICE VISIT (OUTPATIENT)
Dept: PHYSICAL THERAPY | Age: 72
End: 2022-11-10
Attending: NURSE PRACTITIONER
Payer: MEDICARE

## 2022-11-10 PROCEDURE — 97140 MANUAL THERAPY 1/> REGIONS: CPT

## 2022-11-10 PROCEDURE — 97110 THERAPEUTIC EXERCISES: CPT

## 2022-11-10 NOTE — PROGRESS NOTES
Diagnosis:   Plantar fasciitis of left foot (M72.2)  Achilles tendinosis of left lower extremity (J85.47)        Referring Provider: Rohit Velazquez  Date of Evaluation:    11/8/22    Precautions:    Drug Allergy, Cancer, adhesive allergy    Next MD visit:   none scheduled  Date of Surgery: n/a   Insurance Primary/Secondary: MEDICARE / [employer]     # Auth Visits: 10 POC            Subjective: c/o burning pain back of heel. Tylenol works the best for symptoms. States R hip and L back also painful. Would like to get back to the gym, exercise classes and line dancing when she is able. Pain: 5-/610    Objective:   (+) tenderness multiple muscle groups and plantar fasica, post tib and  (+) tenderness Achilles tendon   L ankle active DF: +4 deg post stretch  (-8 deg at eval)    Assessment: Tenderness to palpation multiple muscle groups surrounding ankle as well as the plantar fascia. Able to tolerate light STM with improved gastroc flexibility noted post session. Discussed use of ice for treatment soreness. Goals:   (to be met in 10 visits)  (I) with HEP  Increased ankle DF to 10 deg for more normal gait quality/foot clearance  Increased SLS balance to 3-5 sec for improved balance and gait  Able to tolerate stand/walking for 60 mins for errands with decreased pain to 3/10 max  Increased ankle strength to 5/5 all groups for easier ambulation on uneven surfaces    Plan: Patient will be seen for 2 x/week or a total of 10 visits over a 90 day period. Treatment will include: Gait training, Manual Therapy, Neuromuscular Re-education, Therapeutic Activities, Therapeutic Exercise, Home Exercise Program instruction and cold therapy. Date: 11/10/2022  TX#: 2/10 Date:                 TX#: 3/ Date:                 TX#: 4/ Date:                 TX#: 5/ Date:    Tx#: 6/   TE  HEP review  Calf stretch lunge 5x each       Foam stance 30\"x3       AROM L ankle  Light self STM /tennis ball fascia for home       Manual  STM ankle and plantar fascia, post tib, Achilles and peroneals       HEP: gastroc stretch    Charges: TEx1 man2       Total Timed Treatment: 43 min  Total Treatment Time: 43 min

## 2022-11-15 ENCOUNTER — OFFICE VISIT (OUTPATIENT)
Dept: PHYSICAL THERAPY | Age: 72
End: 2022-11-15
Attending: NURSE PRACTITIONER
Payer: MEDICARE

## 2022-11-15 PROCEDURE — 97140 MANUAL THERAPY 1/> REGIONS: CPT

## 2022-11-15 PROCEDURE — 97110 THERAPEUTIC EXERCISES: CPT

## 2022-11-15 NOTE — PROGRESS NOTES
Diagnosis:   Plantar fasciitis of left foot (M72.2)  Achilles tendinosis of left lower extremity (F66.37)        Referring Provider: Anaid Sanchez  Date of Evaluation:    11/8/22    Precautions:    Drug Allergy, Cancer, adhesive allergy    Next MD visit:   none scheduled  Date of Surgery: n/a   Insurance Primary/Secondary: Yamini Peace / Efraín Duff     # Auth Visits: 10 POC            Subjective:  Doing fairly well during the day, when I stay busy, but still with terrible pain after getting up off the couch/sitting, or first steps in the morning. \"Tried the ice which is not pleasant\"  Pain: 5-/610    Objective:   (+) tenderness multiple muscle groups and plantar fasica, post tib and  (+) tenderness Achilles tendon   L ankle active DF: +4 deg post stretch  (-8 deg at eval)    Assessment:   Gradual improved tolerance for stretches and STM. Provided info on running shoe stores for assistance with supportive footwear. Initial steps after rest/sitting remain very painful. Goals:   (to be met in 10 visits)  (I) with HEP  Increased ankle DF to 10 deg for more normal gait quality/foot clearance  Increased SLS balance to 3-5 sec for improved balance and gait  Able to tolerate stand/walking for 60 mins for errands with decreased pain to 3/10 max  Increased ankle strength to 5/5 all groups for easier ambulation on uneven surfaces    Plan: Footwear for increased support. Self STM. Patient will be seen for 2 x/week or a total of 10 visits over a 90 day period. Treatment will include: Gait training, Manual Therapy, Neuromuscular Re-education, Therapeutic Activities, Therapeutic Exercise, Home Exercise Program instruction and cold therapy. Date: 11/10/2022  TX#: 2/10 Date: 11/15/22                TX#: 3/10 Date:                 TX#: 4/ Date:                 TX#: 5/ Date:    Tx#: 6/   TE  HEP review  Calf stretch lunge 5x each TE  Nustep 5'  Calf stretch 3x each ankle pumps 2'      Foam stance 30\"x3 Pt ed self STM, use of ice at home, footwear info      AROM L ankle  Light self STM /tennis ball fascia for home Manual  STM ankle and plantar fascia, post tib, Achilles and peroneals      Manual  STM ankle and plantar fascia, post tib, Achilles and peroneals CP 10'      HEP: gastroc stretch    Charges: TEx1 man2       Total Timed Treatment: 45 min  Total Treatment Time: 55 min

## 2022-11-17 ENCOUNTER — OFFICE VISIT (OUTPATIENT)
Dept: PHYSICAL THERAPY | Age: 72
End: 2022-11-17
Attending: NURSE PRACTITIONER
Payer: MEDICARE

## 2022-11-17 PROCEDURE — 97140 MANUAL THERAPY 1/> REGIONS: CPT

## 2022-11-17 PROCEDURE — 97110 THERAPEUTIC EXERCISES: CPT

## 2022-11-17 NOTE — PROGRESS NOTES
Diagnosis:   Plantar fasciitis of left foot (M72.2)  Achilles tendinosis of left lower extremity (B36.01)        Referring Provider: Roseanne Hou  Date of Evaluation:    11/8/22    Precautions:    Drug Allergy, Cancer, adhesive allergy    Next MD visit:   none scheduled  Date of Surgery: n/a   Insurance Primary/Secondary: MEDICARE / [employer]     # Auth Visits: 10 POC            Subjective: Shopping for more supportive footwear at the [employer]. Very painful first steps in the morning, it did help to stretch/massage before getting out of bed. Pain: 5-/610    Objective:   prox plantar fascia most tender  (+) tenderness multiple muscle groups and plantar fasica, post tib and  (+) tenderness Achilles tendon   L ankle active DF: +4 deg post stretch  (-8 deg at eval)    Assessment:   Excellent follow through with HEP and pt is shopping for more supportive footwear. Goals:   (to be met in 10 visits)  (I) with HEP  Increased ankle DF to 10 deg for more normal gait quality/foot clearance  Increased SLS balance to 3-5 sec for improved balance and gait  Able to tolerate stand/walking for 60 mins for errands with decreased pain to 3/10 max  Increased ankle strength to 5/5 all groups for easier ambulation on uneven surfaces    Plan: Footwear for increased support. Self STM. Patient will be seen for 2 x/week or a total of 10 visits over a 90 day period. Treatment will include: Gait training, Manual Therapy, Neuromuscular Re-education, Therapeutic Activities, Therapeutic Exercise, Home Exercise Program instruction and cold therapy. Date: 11/10/2022  TX#: 2/10 Date: 11/15/22                TX#: 3/10 Date:  11/17/22               TX#: 4/10 Date:                 TX#: 5/ Date:    Tx#: 6/   TE  HEP review  Calf stretch lunge 5x each TE  Nustep 5'  Calf stretch 3x each ankle pumps 2' TE  Nustep 5'  Calf stretch 3x each  HEP review, self massage, footwear guidelines, use of ice  AROM L ankle     Foam stance 30\"x3 Pt ed self STM, use of ice at home, footwear info      AROM L ankle  Light self STM /tennis ball fascia for home Manual  STM ankle and plantar fascia, post tib, Achilles and peroneals Manual  STM ankle and plantar fascia, post tib, Achilles and peroneals, retrograde massage     Manual  STM ankle and plantar fascia, post tib, Achilles and peroneals CP 10' CP 10'     HEP: gastroc stretch    Charges: TEx1 man2       Total Timed Treatment: 43 min  Total Treatment Time: 53 min

## 2022-11-22 ENCOUNTER — TELEPHONE (OUTPATIENT)
Dept: ORTHOPEDICS CLINIC | Facility: CLINIC | Age: 72
End: 2022-11-22

## 2022-11-22 NOTE — TELEPHONE ENCOUNTER
Patient is calling to request an appt for a RT HIP cortisone injection. Last injection was done on 8/31/22. Patient would like appt ASAP as patient will be out of town after 12/7/22.  Please advise, thank you    Patient can be reached at [phone] or [phone]

## 2022-11-22 NOTE — TELEPHONE ENCOUNTER
Okay to double book a appointment on Friday 12/2/22 or Monday 12/5/22 at a post op or procedure appointment as her injections have to be at least 3 months apart

## 2022-11-22 NOTE — TELEPHONE ENCOUNTER
Patient scheduled   Future Appointments   Date Time Provider Gonzales Tovar   12/5/2022  3:00 PM VALERIE Blackmon [facility]

## 2022-11-29 ENCOUNTER — OFFICE VISIT (OUTPATIENT)
Dept: PHYSICAL THERAPY | Age: 72
End: 2022-11-29
Attending: NURSE PRACTITIONER
Payer: MEDICARE

## 2022-11-29 PROCEDURE — 97140 MANUAL THERAPY 1/> REGIONS: CPT

## 2022-11-29 PROCEDURE — 97110 THERAPEUTIC EXERCISES: CPT

## 2022-11-29 NOTE — PROGRESS NOTES
Diagnosis:   Plantar fasciitis of left foot (M72.2)  Achilles tendinosis of left lower extremity (C59.56)        Referring Provider: Frank Nguyen  Date of Evaluation:    11/8/22    Precautions:    Drug Allergy, Cancer, adhesive allergy    Next MD visit:   none scheduled  Date of Surgery: n/a   Insurance Primary/Secondary: Adriel Qualia / [employer]     # Auth Visits: 10 POC            Subjective: Cortisone shot in the hip scheduled for Friday. Shoe shopping and found [employer] shoes, feet hurt by the end of the day. Found new inserts. Pain: 5-/6 10    Objective:   Middle plantar fascia (+) tenderness  (+) tenderness multiple muscle groups and plantar fasica, post tib and  (+) tenderness Achilles tendon   L ankle active DF: +6 deg post stretch  (-8 deg at eval)    Assessment:   Gastroc flexibility is improving and less tenderness noted with palpation. Pt is transitioning to more supportive footwear. Cues for neutral alignment with gastroc stretching to avoid increased pronation. Goals:   (to be met in 10 visits)  (I) with HEP  Increased ankle DF to 10 deg for more normal gait quality/foot clearance  Increased SLS balance to 3-5 sec for improved balance and gait  Able to tolerate stand/walking for 60 mins for errands with decreased pain to 3/10 max  Increased ankle strength to 5/5 all groups for easier ambulation on uneven surfaces    Plan: Tennis ball self STM. Footwear for increased support. Patient will be seen for 2 x/week or a total of 10 visits over a 90 day period. Treatment will include: Gait training, Manual Therapy, Neuromuscular Re-education, Therapeutic Activities, Therapeutic Exercise, Home Exercise Program instruction and cold therapy. Date: 11/10/2022  TX#: 2/10 Date: 11/15/22                TX#: 3/10 Date:  11/17/22               TX#: 4/10 Date:   11/29/22             TX#: 5/ Date:    Tx#: 6/   TE  HEP review  Calf stretch lunge 5x each TE  Nustep 5'  Calf stretch 3x each ankle pumps 2' TE  Nustep 5'  Calf stretch 3x each  HEP review, self massage, footwear guidelines, use of ice  AROM L ankle TE  AROM ankle DF/PF, inv/ever  Toe extensor stretch 10\" holds  Tennis ball STM sitting 3'    Foam stance 30\"x3 Pt ed self STM, use of ice at home, footwear info      AROM L ankle  Light self STM /tennis ball fascia for home Manual  STM ankle and plantar fascia, post tib, Achilles and peroneals Manual  STM ankle and plantar fascia, post tib, Achilles and peroneals, retrograde massage Manual  STM ankle and plantar fascia, post tib, Achilles and peroneals, retrograde massage  Gr II mid foot, inv/ever    Manual  STM ankle and plantar fascia, post tib, Achilles and peroneals CP 10' CP 10'     HEP: gastroc stretch    Charges: TEx1 man2       Total Timed Treatment: 45 min  Total Treatment Time: 45 min

## 2022-12-01 ENCOUNTER — OFFICE VISIT (OUTPATIENT)
Dept: PHYSICAL THERAPY | Age: 72
End: 2022-12-01
Attending: NURSE PRACTITIONER
Payer: MEDICARE

## 2022-12-01 PROCEDURE — 97110 THERAPEUTIC EXERCISES: CPT

## 2022-12-01 PROCEDURE — 97140 MANUAL THERAPY 1/> REGIONS: CPT

## 2022-12-01 NOTE — PROGRESS NOTES
Diagnosis:   Plantar fasciitis of left foot (M72.2)  Achilles tendinosis of left lower extremity (S69.22)        Referring Provider: Homa Granger  Date of Evaluation:    11/8/22    Precautions:    Drug Allergy, Cancer, adhesive allergy    Next MD visit:   none scheduled  Date of Surgery: n/a   Insurance Primary/Secondary: MEDICARE / India Palmer     # Auth Visits: 10 POC            Subjective: Doing the stretches which seems to be helping. Not quite as painful in the morning. Pain: 5-/6 10    Objective:   Middle plantar fascia (+) tenderness  (+) tenderness multiple muscle groups and plantar fasica, post tib and  (+) tenderness Achilles tendon   L ankle active DF: +6 deg post stretch  (-8 deg at eval)    Assessment:   Pt to add light self STM with tennis ball or roller at home. Leaving for out of town approx 2 months next month; discussed self management of symptoms. Goals:   (to be met in 10 visits)  (I) with HEP  Increased ankle DF to 10 deg for more normal gait quality/foot clearance  Increased SLS balance to 3-5 sec for improved balance and gait  Able to tolerate stand/walking for 60 mins for errands with decreased pain to 3/10 max  Increased ankle strength to 5/5 all groups for easier ambulation on uneven surfaces    Plan: Tennis ball self STM. Footwear for increased support. Patient will be seen for 2 x/week or a total of 10 visits over a 90 day period. Treatment will include: Gait training, Manual Therapy, Neuromuscular Re-education, Therapeutic Activities, Therapeutic Exercise, Home Exercise Program instruction and cold therapy. Unable to tolerate taping due to skin sensitivity.          Date: 11/10/2022  TX#: 2/10 Date: 11/15/22                TX#: 3/10 Date:  11/17/22               TX#: 4/10 Date:   11/29/22             TX#: 5/ Date: 12/1/22  Tx#: 6/10   TE  HEP review  Calf stretch lunge 5x each TE  Nustep 5'  Calf stretch 3x each ankle pumps 2' TE  Nustep 5'  Calf stretch 3x each  HEP review, self massage, footwear guidelines, use of ice  AROM L ankle TE  AROM ankle DF/PF, inv/ever  Toe extensor stretch 10\" holds  Tennis ball STM sitting 3' TE  Rocker board AP/ML 20x each  gastroc lunge stretch 30\"  L/R  Ankle AROM all planes 2'  Tennis ball self STM 3'   Foam stance 30\"x3 Pt ed self STM, use of ice at home, footwear info      AROM L ankle  Light self STM /tennis ball fascia for home Manual  STM ankle and plantar fascia, post tib, Achilles and peroneals Manual  STM ankle and plantar fascia, post tib, Achilles and peroneals, retrograde massage Manual  STM ankle and plantar fascia, post tib, Achilles and peroneals, retrograde massage  Gr II mid foot, inv/ever Manual  STM ankle and plantar fascia, post tib, Achilles and peroneals, retrograde massage  Gr II mid foot, inv/ever   Manual  STM ankle and plantar fascia, post tib, Achilles and peroneals CP 10' CP 10'     HEP: gastroc stretch    Charges: TEx1 man2       Total Timed Treatment: 45 min  Total Treatment Time: 45 min

## 2022-12-02 ENCOUNTER — OFFICE VISIT (OUTPATIENT)
Dept: ORTHOPEDICS CLINIC | Facility: CLINIC | Age: 72
End: 2022-12-02
Payer: MEDICARE

## 2022-12-02 VITALS — OXYGEN SATURATION: 98 % | HEART RATE: 84 BPM

## 2022-12-02 DIAGNOSIS — M16.11 PRIMARY OSTEOARTHRITIS OF RIGHT HIP: Primary | ICD-10-CM

## 2022-12-02 RX ORDER — TRIAMCINOLONE ACETONIDE 40 MG/ML
40 INJECTION, SUSPENSION INTRA-ARTICULAR; INTRAMUSCULAR ONCE
Status: COMPLETED | OUTPATIENT
Start: 2022-12-02 | End: 2022-12-02

## 2022-12-02 RX ADMIN — TRIAMCINOLONE ACETONIDE 40 MG: 40 INJECTION, SUSPENSION INTRA-ARTICULAR; INTRAMUSCULAR at 10:23:00

## 2022-12-02 NOTE — PROCEDURES
Patient had 3 months of great relief from the last hip injection. She is moving her daughter from [place] to [facility] next week, needs more relief of her return of hip pain. After informed consent, the patient's right hip was marked, prepped with topical antiseptic, and locally anesthetized with skin refrigerant followed by injection of 1% lidocaine to create a skin wheal anteriorly at an insertion site a few fingerbreadths lateral to the femoral pulse, along the trajectory of the femoral neck. Next, the area was re-prepped with topical antiseptic, and ultrasound guidance was performed to direct a 20 gauge spinal needle into the hip joint at the junction of the femoral head and neck, after which a mixture of 1mL 40mg/mL Kenalog, 2mL 1% lidocaine and 2mL 0.5% marcaine was injected while visualizing the fluid under ultrasound. Confirmatory imaging was saved to the patient's chart. A band-aid was applied. The patient tolerated the procedure well.     Wayne Walker MD, [address] [facility]  Phone [phone]  Fax [phone]

## 2022-12-05 ENCOUNTER — OFFICE VISIT (OUTPATIENT)
Dept: INTERNAL MEDICINE CLINIC | Facility: CLINIC | Age: 72
End: 2022-12-05
Payer: MEDICARE

## 2022-12-05 VITALS
DIASTOLIC BLOOD PRESSURE: 74 MMHG | WEIGHT: 185.38 LBS | RESPIRATION RATE: 14 BRPM | HEIGHT: 61.93 IN | SYSTOLIC BLOOD PRESSURE: 122 MMHG | TEMPERATURE: 98 F | HEART RATE: 90 BPM | OXYGEN SATURATION: 99 % | BODY MASS INDEX: 34.11 KG/M2

## 2022-12-05 DIAGNOSIS — R73.03 PREDIABETES: ICD-10-CM

## 2022-12-05 DIAGNOSIS — C50.111 MALIGNANT NEOPLASM OF CENTRAL PORTION OF RIGHT BREAST IN FEMALE, ESTROGEN RECEPTOR POSITIVE (HCC): ICD-10-CM

## 2022-12-05 DIAGNOSIS — M85.852 OSTEOPENIA OF NECKS OF BOTH FEMURS: ICD-10-CM

## 2022-12-05 DIAGNOSIS — Z85.89 HISTORY OF SQUAMOUS CELL CARCINOMA: ICD-10-CM

## 2022-12-05 DIAGNOSIS — I10 PRIMARY HYPERTENSION: ICD-10-CM

## 2022-12-05 DIAGNOSIS — Z85.828 HISTORY OF BASAL CELL CANCER: ICD-10-CM

## 2022-12-05 DIAGNOSIS — E03.9 ACQUIRED HYPOTHYROIDISM: ICD-10-CM

## 2022-12-05 DIAGNOSIS — Z86.010 HISTORY OF ADENOMATOUS POLYP OF COLON: ICD-10-CM

## 2022-12-05 DIAGNOSIS — Z98.890 STATUS POST BIOPSY: ICD-10-CM

## 2022-12-05 DIAGNOSIS — G62.9 NEUROPATHY: ICD-10-CM

## 2022-12-05 DIAGNOSIS — L71.9 ROSACEA: ICD-10-CM

## 2022-12-05 DIAGNOSIS — M85.851 OSTEOPENIA OF NECKS OF BOTH FEMURS: ICD-10-CM

## 2022-12-05 DIAGNOSIS — F51.01 PRIMARY INSOMNIA: ICD-10-CM

## 2022-12-05 DIAGNOSIS — E55.9 VITAMIN D DEFICIENCY: ICD-10-CM

## 2022-12-05 DIAGNOSIS — E66.09 CLASS 1 OBESITY DUE TO EXCESS CALORIES WITH SERIOUS COMORBIDITY AND BODY MASS INDEX (BMI) OF 33.0 TO 33.9 IN ADULT: ICD-10-CM

## 2022-12-05 DIAGNOSIS — Z17.0 MALIGNANT NEOPLASM OF CENTRAL PORTION OF RIGHT BREAST IN FEMALE, ESTROGEN RECEPTOR POSITIVE (HCC): ICD-10-CM

## 2022-12-05 DIAGNOSIS — E78.00 HYPERCHOLESTEROLEMIA: ICD-10-CM

## 2022-12-05 DIAGNOSIS — N18.31 STAGE 3A CHRONIC KIDNEY DISEASE (HCC): ICD-10-CM

## 2022-12-05 DIAGNOSIS — L30.9 HAND DERMATITIS: ICD-10-CM

## 2022-12-05 DIAGNOSIS — I87.2 VENOUS INSUFFICIENCY: ICD-10-CM

## 2022-12-05 DIAGNOSIS — G47.33 OSA (OBSTRUCTIVE SLEEP APNEA): ICD-10-CM

## 2022-12-05 DIAGNOSIS — Z00.00 ENCOUNTER FOR ANNUAL PHYSICAL EXAM: Primary | ICD-10-CM

## 2022-12-05 PROBLEM — E66.811 CLASS 1 OBESITY DUE TO EXCESS CALORIES WITH SERIOUS COMORBIDITY AND BODY MASS INDEX (BMI) OF 33.0 TO 33.9 IN ADULT: Status: ACTIVE | Noted: 2021-12-02

## 2022-12-05 RX ORDER — FLUTICASONE PROPIONATE 50 MCG
1 SPRAY, SUSPENSION (ML) NASAL 2 TIMES DAILY
Qty: 3 EACH | Refills: 1 | Status: SHIPPED | OUTPATIENT
Start: 2022-12-05

## 2022-12-05 RX ORDER — LISINOPRIL 10 MG/1
10 TABLET ORAL DAILY
Qty: 90 TABLET | Refills: 1 | Status: SHIPPED | OUTPATIENT
Start: 2022-12-05

## 2022-12-05 RX ORDER — POTASSIUM CHLORIDE 20 MEQ/1
20 TABLET, EXTENDED RELEASE ORAL EVERY OTHER DAY
Qty: 45 TABLET | Refills: 1 | Status: SHIPPED | OUTPATIENT
Start: 2022-12-05

## 2022-12-05 RX ORDER — ESZOPICLONE 1 MG/1
1 TABLET, FILM COATED ORAL NIGHTLY PRN
Qty: 30 TABLET | Refills: 1 | Status: SHIPPED | OUTPATIENT
Start: 2022-12-05

## 2022-12-05 RX ORDER — LEVOTHYROXINE SODIUM 0.07 MG/1
75 TABLET ORAL
Qty: 90 TABLET | Refills: 1 | Status: SHIPPED | OUTPATIENT
Start: 2022-12-05

## 2022-12-05 RX ORDER — FUROSEMIDE 40 MG/1
40 TABLET ORAL EVERY OTHER DAY
Qty: 45 TABLET | Refills: 1 | Status: SHIPPED | OUTPATIENT
Start: 2022-12-05

## 2022-12-05 NOTE — PATIENT INSTRUCTIONS
Get your labs done. You do need to be fasting for this. Get your tetanus vaccine (tdap) when you come back. Get your colonoscopy in March. [address] SCREENING SCHEDULE   Tests on this list are recommended by your physician but may not be covered, or covered at this frequency, by your insurer. Please check with your insurance carrier before scheduling to verify coverage.    PREVENTATIVE SERVICES FREQUENCY &  COVERAGE DETAILS LAST COMPLETION DATE   Diabetes Screening    Fasting Blood Sugar /  Glucose    One screening every 12 months if never tested or if previously tested but not diagnosed with pre-diabetes   One screening every 6 months if diagnosed with pre-diabetes Lab Results   Component Value Date    GLU 93 02/07/2022        Cardiovascular Disease Screening    Lipid Panel  Cholesterol  Lipoprotein (HDL)  Triglycerides Covered every 5 years for all Medicare beneficiaries without apparent signs or symptoms of cardiovascular disease Lab Results   Component Value Date    CHOLEST 203 (H) 06/07/2022    HDL 62 (H) 06/07/2022     (H) 06/07/2022    TRIG 88 06/07/2022         Electrocardiogram (EKG)   Covered if needed at Welcome to Medicare, and non-screening if indicated for medical reasons 02/16/2022      Ultrasound Screening for Abdominal Aortic Aneurysm (AAA) Covered once in a lifetime for one of the following risk factors    Men who are 73-68 years old and have ever smoked    Anyone with a family history -     Colorectal Cancer Screening  Covered for ages 52-80; only need ONE of the following:    Colonoscopy   Covered every 10 years    Covered every 2 years if patient is at high risk or previous colonoscopy was abnormal 03/16/2018    Colorectal Cancer Screening due on 03/16/2023    Flexible Sigmoidoscopy   Covered every 4 years -    Fecal Occult Blood Test Covered annually -   Bone Density Screening    Bone density screening    Covered every 2 years after age 72 if diagnosed with risk of osteoporosis or estrogen deficiency. Covered yearly for long-term glucocorticoid medication use (Steroids) Last Dexa Scan:    XR DEXA BONE DENSITOMETRY (CPT=77080) 12/07/2021      No recommendations at this time   Pap and Pelvic    Pap   Covered every 2 years for women at normal risk;  Annually if at high risk -  No recommendations at this time    Chlamydia Annually if high risk -  No recommendations at this time   Screening Mammogram    Mammogram     Recommend annually for all female patients aged 36 and older    One baseline mammogram covered for patients aged 32-38 -    No recommendations at this time    Immunizations    Influenza Covered once per flu season  Please get every year -  Influenza Vaccine(1) due on 10/01/2022    Pneumococcal Each vaccine ([id number] & [id number]) covered once after 65 Prevnar 13: 11/21/2017    Kchyvhdvw53: 02/11/2019     No recommendations at this time    Hepatitis B One screening covered for patients with certain risk factors   -  No recommendations at this time    Tetanus Toxoid Not covered by Medicare Part B unless medically necessary (cut with metal); may be covered with your pharmacy prescription benefits -    Tetanus, Diptheria and Pertusis TD and TDaP Not covered by Medicare Part B -  No recommendations at this time    Zoster Not covered by Medicare Part B; may be covered with your pharmacy  prescription benefits 10/27/2014  No recommendations at this time       Annual Monitoring of Persistent Medications (ACE/ARB, digoxin diuretics, anticonvulsants)    Potassium Annually Lab Results   Component Value Date    K 4.0 02/28/2022         Creatinine   Annually Lab Results   Component Value Date    CREATSERUM 0.83 02/07/2022         BUN Annually Lab Results   Component Value Date    BUN 23 (H) 02/07/2022       Drug Serum Conc Annually No results found for: DIGOXIN, DIG, VALP

## 2022-12-06 ENCOUNTER — OFFICE VISIT (OUTPATIENT)
Dept: PHYSICAL THERAPY | Age: 72
End: 2022-12-06
Attending: NURSE PRACTITIONER
Payer: MEDICARE

## 2022-12-06 ENCOUNTER — LAB ENCOUNTER (OUTPATIENT)
Dept: LAB | Age: 72
End: 2022-12-06
Attending: NURSE PRACTITIONER
Payer: MEDICARE

## 2022-12-06 DIAGNOSIS — N18.31 STAGE 3A CHRONIC KIDNEY DISEASE (HCC): ICD-10-CM

## 2022-12-06 DIAGNOSIS — I10 PRIMARY HYPERTENSION: ICD-10-CM

## 2022-12-06 LAB
ANION GAP SERPL CALC-SCNC: 3 MMOL/L (ref 0–18)
BUN BLD-MCNC: 28 MG/DL (ref 7–18)
CALCIUM BLD-MCNC: 9.2 MG/DL (ref 8.5–10.1)
CHLORIDE SERPL-SCNC: 109 MMOL/L (ref 98–112)
CO2 SERPL-SCNC: 28 MMOL/L (ref 21–32)
CREAT BLD-MCNC: 0.93 MG/DL
FASTING STATUS PATIENT QL REPORTED: NO
GFR SERPLBLD BASED ON 1.73 SQ M-ARVRAT: 66 ML/MIN/1.73M2 (ref 60–?)
GLUCOSE BLD-MCNC: 101 MG/DL (ref 70–99)
OSMOLALITY SERPL CALC.SUM OF ELEC: 296 MOSM/KG (ref 275–295)
POTASSIUM SERPL-SCNC: 4 MMOL/L (ref 3.5–5.1)
SODIUM SERPL-SCNC: 140 MMOL/L (ref 136–145)

## 2022-12-06 PROCEDURE — 36415 COLL VENOUS BLD VENIPUNCTURE: CPT

## 2022-12-06 PROCEDURE — 80048 BASIC METABOLIC PNL TOTAL CA: CPT

## 2022-12-06 PROCEDURE — 97140 MANUAL THERAPY 1/> REGIONS: CPT

## 2022-12-06 PROCEDURE — 97110 THERAPEUTIC EXERCISES: CPT

## 2022-12-06 NOTE — PROGRESS NOTES
Diagnosis:   Plantar fasciitis of left foot (M72.2)  Achilles tendinosis of left lower extremity (V14.83)        Referring Provider: Danuta Linton  Date of Evaluation:    11/8/22    Precautions:    Drug Allergy, Cancer, adhesive allergy    Next MD visit:   none scheduled  Date of Surgery: n/a   Insurance Primary/Secondary: [employer]ad[employer]fer[employer] Camejo / Eppie Bence     # Auth Visits: 10 POC            Progress Summary  Pt has attended 7, cancelled 0, and no shown 0 visits in Physical Therapy. Subjective:    Reports overall approx 75% improvement. Pain has decreased to 1-2/10. The first steps in the morning or after sitting are still difficult. Leaving out of country tomorrow; plans to keep doing the exercises and will find good fitting shoes. Feels more ready to return to gym exercises. Pain: 1-2/10    Objective:   Bilat pronation: incorporating wear of more supportive footwear more consistently. Middle plantar fascia (+) tenderness  (+) tenderness multiple muscle groups and plantar fasica, post tib and  (+) tenderness Achilles tendon - overall significant progress   ROM: L ankle active DF: +6 deg post stretch  (-8 deg at eval)  Strength testing: much improved pain with resistance testing. Able to progress to light strengthening over the next month. Assessment:  Excellent progress in PT and pt is noting overall decreased symptoms, despite especially busy time right now. Continued emphasis on proper footwear which will also be beneficial.  Leaving for out of country approx 1-2 months; discussed self management of symptoms. Expect continued progress with HEP.      Goals: Progress made toward all goals  (to be met in 10 visits)  (I) with HEP  Increased ankle DF to 10 deg for more normal gait quality/foot clearance  Increased SLS balance to 3-5 sec for improved balance and gait  Able to tolerate stand/walking for 60 mins for errands with decreased pain to 3/10 max  Increased ankle strength to 5/5 all groups for easier ambulation on uneven surfaces    Plan: Pt leaving out of country possibly a month or more. Discussed self management; contact via [facility] as needed. May resume next month to complete last 3 visits if needed. Patient/Family/Caregiver was advised of these findings, precautions, and treatment options and has agreed to actively participate in planning and for this course of care. Thank you for your referral. If you have any questions, please contact me at Dept: [phone]. Sincerely,  Electronically signed by therapist: Barrera Hall PT    [de-identified] certification required: No                Date: 11/10/2022  TX#: 2/10 Date: 11/15/22                TX#: 3/10 Date:  11/17/22               TX#: 4/10 Date:   11/29/22             TX#: 5/ Date: 12/1/22  Tx#: 6/10 12/6/22  7/10   TE  HEP review  Calf stretch lunge 5x each TE  Nustep 5'  Calf stretch 3x each ankle pumps 2' TE  Nustep 5'  Calf stretch 3x each  HEP review, self massage, footwear guidelines, use of ice  AROM L ankle TE  AROM ankle DF/PF, inv/ever  Toe extensor stretch 10\" holds  Tennis ball STM sitting 3' TE  Rocker board AP/ML 20x each  gastroc lunge stretch 30\"  L/R  Ankle AROM all planes 2'  Tennis ball self STM 3' TE  Review self management principles including footwear, self STM, stretching and icing.    Progress to yellow TB with painfree strengthening: inv/ever 20-30 reps  Light painfree DLHR 10x2   Foam stance 30\"x3 Pt ed self STM, use of ice at home, footwear info    Tennis ball STM 2'   AROM L ankle  Light self STM /tennis ball fascia for home Manual  STM ankle and plantar fascia, post tib, Achilles and peroneals Manual  STM ankle and plantar fascia, post tib, Achilles and peroneals, retrograde massage Manual  STM ankle and plantar fascia, post tib, Achilles and peroneals, retrograde massage  Gr II mid foot, inv/ever Manual  STM ankle and plantar fascia, post tib, Achilles and peroneals, retrograde massage  Gr II mid foot, inv/ever Manual  STM ankle and plantar fascia, post tib, Achilles and peroneals, retrograde massage  Gr II mid foot, inv/valdemar   Manual  STM ankle and plantar fascia, post tib, Achilles and peroneals CP 10' CP 10'      HEP: gastroc stretch. DLHR painfree, YTB inv/ever 2-3x/week.      Charges: TEx1 man2       Total Timed Treatment: 45 min  Total Treatment Time: 45 min

## 2022-12-08 ENCOUNTER — APPOINTMENT (OUTPATIENT)
Dept: PHYSICAL THERAPY | Age: 72
End: 2022-12-08
Attending: NURSE PRACTITIONER
Payer: MEDICARE

## 2023-01-17 NOTE — TELEPHONE ENCOUNTER
I agree with procedure orders    MRI brain for neuro ti was ordered by [facility] No

## 2023-02-25 ENCOUNTER — OFFICE VISIT (OUTPATIENT)
Dept: INTERNAL MEDICINE CLINIC | Facility: CLINIC | Age: 73
End: 2023-02-25
Payer: MEDICARE

## 2023-02-25 VITALS
HEART RATE: 76 BPM | DIASTOLIC BLOOD PRESSURE: 76 MMHG | HEIGHT: 62.5 IN | TEMPERATURE: 97 F | RESPIRATION RATE: 12 BRPM | SYSTOLIC BLOOD PRESSURE: 120 MMHG | WEIGHT: 188.13 LBS | BODY MASS INDEX: 33.75 KG/M2

## 2023-02-25 DIAGNOSIS — R73.03 PREDIABETES: ICD-10-CM

## 2023-02-25 DIAGNOSIS — E78.00 HYPERCHOLESTEROLEMIA: ICD-10-CM

## 2023-02-25 DIAGNOSIS — I10 PRIMARY HYPERTENSION: ICD-10-CM

## 2023-02-25 DIAGNOSIS — E03.9 ACQUIRED HYPOTHYROIDISM: ICD-10-CM

## 2023-02-25 DIAGNOSIS — M48.062 SPINAL STENOSIS OF LUMBAR REGION WITH NEUROGENIC CLAUDICATION: Primary | ICD-10-CM

## 2023-02-25 PROCEDURE — 99214 OFFICE O/P EST MOD 30 MIN: CPT | Performed by: NURSE PRACTITIONER

## 2023-02-25 RX ORDER — METHYLPREDNISOLONE 4 MG/1
TABLET ORAL
Qty: 1 EACH | Refills: 0 | Status: SHIPPED | OUTPATIENT
Start: 2023-02-25

## 2023-02-25 NOTE — PATIENT INSTRUCTIONS
Do the MRI of lumbar spine. Start the oral steroid today. Take it with food. Do not lay down for 1 hour after taking it. Monitor for side effects including stomach pain, acid reflux, trouble sleeping, and anxiety. Alternate between Tylenol or Advil as needed for pain. Avoid heavy lifting or strenuous activity. Start physical therapy. Get your labs done in June prior to your med check. You should be fasting for at least 10 hours. If you take a multivitamin with Biotin or any biotin product it should be held for 3 days prior to getting your labs done.  If you use [facility] labs, you may schedule at [phone] or on-line at [facility].

## 2023-02-28 ENCOUNTER — TELEPHONE (OUTPATIENT)
Dept: PHYSICAL THERAPY | Facility: HOSPITAL | Age: 73
End: 2023-02-28

## 2023-02-28 ENCOUNTER — OFFICE VISIT (OUTPATIENT)
Dept: PHYSICAL THERAPY | Age: 73
End: 2023-02-28
Attending: NURSE PRACTITIONER
Payer: MEDICARE

## 2023-02-28 ENCOUNTER — MED REC SCAN ONLY (OUTPATIENT)
Dept: INTERNAL MEDICINE CLINIC | Facility: CLINIC | Age: 73
End: 2023-02-28

## 2023-02-28 PROCEDURE — 97163 PT EVAL HIGH COMPLEX 45 MIN: CPT

## 2023-02-28 PROCEDURE — 97110 THERAPEUTIC EXERCISES: CPT

## 2023-03-02 ENCOUNTER — HOSPITAL ENCOUNTER (OUTPATIENT)
Dept: MRI IMAGING | Age: 73
Discharge: HOME | End: 2023-03-02
Attending: NURSE PRACTITIONER
Payer: MEDICARE

## 2023-03-02 ENCOUNTER — OFFICE VISIT (OUTPATIENT)
Dept: PHYSICAL THERAPY | Age: 73
End: 2023-03-02
Attending: NURSE PRACTITIONER
Payer: MEDICARE

## 2023-03-02 DIAGNOSIS — M48.062 SPINAL STENOSIS OF LUMBAR REGION WITH NEUROGENIC CLAUDICATION: ICD-10-CM

## 2023-03-02 PROCEDURE — 97110 THERAPEUTIC EXERCISES: CPT

## 2023-03-02 PROCEDURE — 72148 MRI LUMBAR SPINE W/O DYE: CPT | Performed by: NURSE PRACTITIONER

## 2023-03-02 PROCEDURE — 97112 NEUROMUSCULAR REEDUCATION: CPT

## 2023-03-02 PROCEDURE — 97140 MANUAL THERAPY 1/> REGIONS: CPT

## 2023-03-02 NOTE — PROGRESS NOTES
Diagnosis:   Spinal stenosis of lumbar region with neurogenic claudication (B74.378)           Referring Provider: Seferino Javed  Date of Evaluation:    2/28/23    Precautions: drug allergy, tape adhesive, cancer Next MD visit:   none scheduled  Date of Surgery: n/a   Insurance Primary/Secondary: MEDICARE / Vicente Canela     # Auth Visits: 10            Subjective: MRI completed. c/o leg pain very bothersome, difficulty walking or standing for any length of time. Doing the exercises every day and they're going well. Pain: 5/10, at best 3-4/10, at worst 7-8/10 walking, activity     Objective:   (+) severe tenderness bilat ITB especially proximally. (+) tenderness through LS musculature L>R and into L gluteal area/sciatic nerve   (+) L LE with ext, L SB      Assessment: Good tolerance for flexion based exercises with relief of symptoms, however symptoms quickly return upon standing thus far. Neutral spine training and abdominal bracing to help decrease instances of shooting pains with transitional movements. Goals:   (to be met in 10 visits)   (I) with HEP  Demonstrate proper body mechanics with functional squat and without c/o pain. Able to tolerate activities in standing/walking for errands 30 mins without aggravation of LE pain   Able to sleep uninterrupted from LE pain. Radicular symptoms abolished with all ADL's. Plan: Patient will be seen for 1-2 x/week or a total of 10 visits over a 90 day period. Treatment will include: Gait training, Manual Therapy, Neuromuscular Re-education, Therapeutic Activities, Therapeutic Exercise, Home Exercise Program instruction and IFC/heat/ice if indicated     Date: 3/2/2023  TX#: 2/10 Date:                 TX#: 3/ Date:                 TX#: 4/ Date:                 TX#: 5/ Date:    Tx#: 6/   NR  Back care ed transfer training/neutral spine, log roll for bed mobility   TrAb brace hooklying with mini bridge train  Lumbar support - pt owns       Manual  L LE gentle distraction 30\"x3  Lumbar rot Gr I  30\"x3  Light STM L ITB       TE  DKTC  Cross over piriformis stretch review  HEP review                HEP: DKTC/SKTC, cross leg stretch, pelvic tilt.   Sciatic nerve glides    Charges: TE man NR       Total Timed Treatment: 45 min  Total Treatment Time: 45 min

## 2023-03-03 DIAGNOSIS — M47.816 FACET ARTHROPATHY, LUMBAR: Primary | ICD-10-CM

## 2023-03-07 ENCOUNTER — OFFICE VISIT (OUTPATIENT)
Dept: PHYSICAL THERAPY | Age: 73
End: 2023-03-07
Attending: NURSE PRACTITIONER
Payer: MEDICARE

## 2023-03-07 PROCEDURE — 97112 NEUROMUSCULAR REEDUCATION: CPT

## 2023-03-07 PROCEDURE — 97140 MANUAL THERAPY 1/> REGIONS: CPT

## 2023-03-07 PROCEDURE — 97110 THERAPEUTIC EXERCISES: CPT

## 2023-03-07 NOTE — PROGRESS NOTES
Diagnosis:   Spinal stenosis of lumbar region with neurogenic claudication (S77.614)           Referring Provider: Olman Norton  Date of Evaluation:    23    Precautions: drug allergy, tape adhesive, cancer Next MD visit:   none scheduled  Date of Surgery: n/a   Insurance Primary/Secondary: MEDICARE / Gabriela Thomas     # Auth Visits: 10            Subjective: Primary c/o vice  on my left ankle, the worst pain. Only able to stand about 5-10 mins. Using the pillow between the legs which helps. Nurse called and explained MRI results; referred to back specialist for consult; to schedule. Pain: 5/10, at best 3-4/10, at worst 7-8/10 walking, activity     Objective:   L (+) SLR  (+) severe tenderness bilat ITB especially proximally. (+) tenderness through LS musculature L>R and into L gluteal area/sciatic nerve   (+) L LE with ext, L SB      Assessment: Review of body mechanics and activity modification to help  irritability of symptoms. Instances of severe radiating symptoms beginning to subside. Promoting log roll technique and neutral spine with transfers and bed positioning. Reminders for sitting breaks with functional tasks. Goals:   (to be met in 10 visits)   (I) with HEP  Demonstrate proper body mechanics with functional squat and without c/o pain. Able to tolerate activities in standing/walking for errands 30 mins without aggravation of LE pain   Able to sleep uninterrupted from LE pain. Radicular symptoms abolished with all ADL's. Plan: Sciatic nerve glides. Review positioning for neutral spine. Patient will be seen for 1-2 x/week or a total of 10 visits over a 90 day period.  Treatment will include: Gait training, Manual Therapy, Neuromuscular Re-education, Therapeutic Activities, Therapeutic Exercise, Home Exercise Program instruction and IFC/heat/ice if indicated     Date: 3/2/2023  TX#: 2/10 Date:  3/7/23               TX#: 3/10 Date:                 TX#:  Date:                 TX#:  Date:   Tx#: 6/   NR  Back care ed transfer training/neutral spine, log roll for bed mobility   TrAb brace hooklying with mini bridge train  Lumbar support - pt owns NR 25'  Back care ed incl neutral spine, hip hinge and log roll with mobility  STS with incr use of LE's  Sciatic nerve glides 10x2 sitting, supine options instructed  Trial lumbar support at home  TrAb bracing train      Manual  L LE gentle distraction 30\"x3  Lumbar rot Gr I  30\"x3  Light STM L ITB Manual 20'  L LE gentle distraction 30\"x3  Lumbar rot Gr I  30\"x3  Light STM L ITB      TE  DKTC  Cross over piriformis stretch review  HEP review   TE 10'  Nustep 5'  Passive gentle ham, piriformis stretches  Pelvic tilt 10x  Hep review with options for flexion based stretch ie 3 way prayer sitting               HEP: DKTC/SKTC, cross leg stretch, pelvic tilt.   Sciatic nerve glides sitting and supine options    Charges: TE man NRx2       Total Timed Treatment: 55 min  Total Treatment Time: 55 min

## 2023-03-09 ENCOUNTER — OFFICE VISIT (OUTPATIENT)
Dept: PHYSICAL THERAPY | Age: 73
End: 2023-03-09
Attending: NURSE PRACTITIONER
Payer: MEDICARE

## 2023-03-09 PROCEDURE — 97140 MANUAL THERAPY 1/> REGIONS: CPT

## 2023-03-09 PROCEDURE — 97110 THERAPEUTIC EXERCISES: CPT

## 2023-03-09 PROCEDURE — 97112 NEUROMUSCULAR REEDUCATION: CPT

## 2023-03-09 NOTE — PROGRESS NOTES
Diagnosis:   Spinal stenosis of lumbar region with neurogenic claudication (M87.257)           Referring Provider: Petrona Orozco  Date of Evaluation:    2/28/23    Precautions: drug allergy, tape adhesive, cancer Next MD visit:   none scheduled  Date of Surgery: n/a   Insurance Primary/Secondary: MEDICARE / Sheela Flores     # Auth Visits: 10            Subjective: Able to shop, about 30 mins, leaning on the cart really helps. Still with the ankle/LE pain. Pain:   5/10 with standing, 2-3/10       Objective:   L glut med 3-/5 *pain  Lumbar support for pain relief  L (+) SLR  (+) severe tenderness bilat ITB especially proximally. (+) tenderness through LS musculature L>R and into L gluteal area/sciatic nerve   (+) L LE with ext, L SB      Assessment: Tolerance for weight bearing activity remains limited due to L LE and LBP. Modification of slant board to lunge option for gastroc stretch due to LBP. Good tolerance for flexion based exercises with pain relief. Goals:   (to be met in 10 visits)   (I) with HEP  Demonstrate proper body mechanics with functional squat and without c/o pain. Able to tolerate activities in standing/walking for errands 30 mins without aggravation of LE pain   Able to sleep uninterrupted from LE pain. Radicular symptoms abolished with all ADL's. Plan:low level stabilization next visit. Sciatic nerve glides. Patient will be seen for 1-2 x/week or a total of 10 visits over a 90 day period. Treatment will include: Gait training, Manual Therapy, Neuromuscular Re-education, Therapeutic Activities, Therapeutic Exercise, Home Exercise Program instruction and IFC/heat/ice if indicated     Date: 3/2/2023  TX#: 2/10 Date:  3/7/23               TX#: 3/10 Date:  3/9/23               TX#: 4/10 Date:                 TX#: 5/ Date:    Tx#: 6/   NR  Back care ed transfer training/neutral spine, log roll for bed mobility   TrAb brace hooklying with mini bridge train  Lumbar support - pt owns NR 25'  Back care ed incl neutral spine, hip hinge and log roll with mobility  STS with incr use of LE's  Sciatic nerve glides 10x2 sitting, supine options instructed  Trial lumbar support at home  TrAb bracing train NR 25'  Back care neutral spine transitional movements review  Supine nerve glides 10x  TrAb brace train  Clam 10x2  TrAb with BKFO 10x each  Wt shift -> SLS with support         Manual  L LE gentle distraction 30\"x3  Lumbar rot Gr I  30\"x3  Light STM L ITB Manual 20'  L LE gentle distraction 30\"x3  Lumbar rot Gr I  30\"x3  Light STM L ITB Manual 20'  L LE gentle distraction 30\"x3  Lumbar rot Gr I, II  30\"x4  Light STM L ITB     TE  DKTC  Cross over piriformis stretch review  HEP review   TE 10'  Nustep 5'  Passive gentle ham, piriformis stretches  Pelvic tilt 10x  Hep review with options for flexion based stretch ie 3 way prayer sitting TE 10'  Nustep 5'  Pelvic tilt 10x  gastroc stretch L/R 30\"x2 each                HEP: DKTC/SKTC, cross leg stretch, pelvic tilt. Sciatic nerve glides sitting and supine options.  clam    Charges: TE man NRx2       Total Timed Treatment: 55 min  Total Treatment Time: 55 min

## 2023-03-14 ENCOUNTER — OFFICE VISIT (OUTPATIENT)
Dept: PHYSICAL THERAPY | Age: 73
End: 2023-03-14
Attending: NURSE PRACTITIONER
Payer: MEDICARE

## 2023-03-14 PROCEDURE — 97140 MANUAL THERAPY 1/> REGIONS: CPT

## 2023-03-14 PROCEDURE — 97112 NEUROMUSCULAR REEDUCATION: CPT

## 2023-03-14 PROCEDURE — 97110 THERAPEUTIC EXERCISES: CPT

## 2023-03-14 NOTE — PROGRESS NOTES
Diagnosis:   Spinal stenosis of lumbar region with neurogenic claudication (Z14.817)           Referring Provider: Esperanza Barth  Date of Evaluation:    2/28/23    Precautions: drug allergy, tape adhesive, cancer Next MD visit:   none scheduled  Date of Surgery: n/a   Insurance Primary/Secondary: MEDICARE / Heidy Gonzales     # Auth Visits: 10            Subjective:  Pt reports overdoing it with errands and in the kitchen. L pain into the ankle and now pain R LB and into hip region. Pain: 6/10 standing, walking 3-4/10, laying 2-3/10    Objective:   Antalgic gait, (+) Trendelenburg  L glut med 3-/5 *pain  Lumbar support for pain relief  L (+) SLR  (+) severe tenderness bilat ITB especially proximally. (+) tenderness through LS musculature L>R and into L gluteal area/sciatic nerve   (+) L LE with ext, L SB      Assessment: Aggravation of symptoms recently attributed to increased weight bearing activity over the past few days. Pt is very motivated to improve and to complete productive tasks; tends to overdo. She is beginning to incorporate rest breaks and pacing principles but with difficulty. Goals:   (to be met in 10 visits)   (I) with HEP  Demonstrate proper body mechanics with functional squat and without c/o pain. Able to tolerate activities in standing/walking for errands 30 mins without aggravation of LE pain   Able to sleep uninterrupted from LE pain. Radicular symptoms abolished with all ADL's. Plan:  Hooklying stabilization training next visit. Cont with sciatic nerve glides. Patient will be seen for 1-2 x/week or a total of 10 visits over a 90 day period. Treatment will include: Gait training, Manual Therapy, Neuromuscular Re-education, Therapeutic Activities, Therapeutic Exercise, Home Exercise Program instruction and IFC/heat/ice if indicated     Date: 3/2/2023  TX#: 2/10 Date:  3/7/23               TX#: 3/10 Date:  3/9/23               TX#: 4/10 Date: 3/14/23                TX#: 5/10 Date:    Tx#: 6/ NR  Back care ed transfer training/neutral spine, log roll for bed mobility   TrAb brace hooklying with mini bridge train  Lumbar support - pt owns NR 25'  Back care ed incl neutral spine, hip hinge and log roll with mobility  STS with incr use of LE's  Sciatic nerve glides 10x2 sitting, supine options instructed  Trial lumbar support at home  TrAb bracing train NR 25'  Back care neutral spine transitional movements review  Supine nerve glides 10x  TrAb brace train  Clam 10x2  TrAb with BKFO 10x each  Wt shift -> SLS with support     TE 15'  Nustep 5'  Passive SKTC, piriformis and ham gentle stretches 5'  HEP review          NR 10'  Sciatic neve glides sitting 15x each  TrAb train with hooklying SL march 10x each with breaks        Manual  L LE gentle distraction 30\"x3  Lumbar rot Gr I  30\"x3  Light STM L ITB Manual 20'  L LE gentle distraction 30\"x3  Lumbar rot Gr I  30\"x3  Light STM L ITB Manual 20'  L LE gentle distraction 30\"x3  Lumbar rot Gr I, II  30\"x4  Light STM L ITB Manual 20'  L LE gentle distraction 30\"x3  Lumbar rot Gr I, II  30\"x4  Light STM L ITB    TE  DKTC  Cross over piriformis stretch review  HEP review   TE 10'  Nustep 5'  Passive gentle ham, piriformis stretches  Pelvic tilt 10x  Hep review with options for flexion based stretch ie 3 way prayer sitting TE 10'  Nustep 5'  Pelvic tilt 10x  gastroc stretch L/R 30\"x2 each                HEP: DKTC/SKTC, cross leg stretch, pelvic tilt. Sciatic nerve glides sitting and supine options.  clam    Charges: TE man NRx1       Total Timed Treatment: 45 min  Total Treatment Time: 45 min

## 2023-03-16 ENCOUNTER — OFFICE VISIT (OUTPATIENT)
Dept: PHYSICAL THERAPY | Age: 73
End: 2023-03-16
Attending: NURSE PRACTITIONER
Payer: MEDICARE

## 2023-03-16 PROCEDURE — 97140 MANUAL THERAPY 1/> REGIONS: CPT

## 2023-03-16 PROCEDURE — 97112 NEUROMUSCULAR REEDUCATION: CPT

## 2023-03-16 NOTE — PROGRESS NOTES
Diagnosis:   Spinal stenosis of lumbar region with neurogenic claudication (R64.255)           Referring Provider: Daryle Schwalbe  Date of Evaluation:    2/28/23    Precautions: drug allergy, tape adhesive, cancer Next MD visit:   none scheduled  Date of Surgery: n/a   Insurance Primary/Secondary: MEDICARE / Sherri Aviacode     # Auth Visits: 10            Subjective:  Pain beginning to subside since Tuesday. Pain in the leg woke me up last night. Considering returning for hip injection after the 3+ months. Pain: 5-6/10 standing, walking 3-4/10, laying 2-3/10    Objective:   3/17/23  SLR  R (-)  L (+) for L buttock and L sided LBP. No radiating N/T today  Antalgic gait, (+) Trendelenburg  L glut med 3-/5 *pain  Lumbar support for pain relief    L (+) SLR  (+) severe tenderness bilat ITB especially proximally. (+) tenderness through LS musculature L>R and into L gluteal area/sciatic nerve   (+) L LE with ext, L SB      Assessment: Initiated low level stabilization exercises with modified bridge; pt fannie well without aggravation of symptoms. Goals:   (to be met in 10 visits)   (I) with HEP  Demonstrate proper body mechanics with functional squat and without c/o pain. Able to tolerate activities in standing/walking for errands 30 mins without aggravation of LE pain   Able to sleep uninterrupted from LE pain. Radicular symptoms abolished with all ADL's. Plan:  Hooklying TB, stabilization training. Cont with sciatic nerve glides. Patient will be seen for 1-2 x/week or a total of 10 visits over a 90 day period.  Treatment will include: Gait training, Manual Therapy, Neuromuscular Re-education, Therapeutic Activities, Therapeutic Exercise, Home Exercise Program instruction and IFC/heat/ice if indicated     Date: 3/2/2023  TX#: 2/10 Date:  3/7/23               TX#: 3/10 Date:  3/9/23               TX#: 4/10 Date: 3/14/23                TX#: 5/10 Date: 3/16/23  Tx#: 6/   NR  Back care ed transfer training/neutral spine, log roll for bed mobility   TrAb brace hooklying with mini bridge train  Lumbar support - pt owns NR 25'  Back care ed incl neutral spine, hip hinge and log roll with mobility  STS with incr use of LE's  Sciatic nerve glides 10x2 sitting, supine options instructed  Trial lumbar support at home  TrAb bracing train NR 25'  Back care neutral spine transitional movements review  Supine nerve glides 10x  TrAb brace train  Clam 10x2  TrAb with BKFO 10x each  Wt shift -> SLS with support     TE 15'  Nustep 5'  Passive SKTC, piriformis and ham gentle stretches 5'  HEP review          NR 10'  Sciatic neve glides sitting 15x each  TrAb train with hooklying SL march 10x each with breaks     NR 25'  TrAb train:  Modified bridge goal 10x2  HEP review/additions  Sciatic nerve glides 10x2  each  DLHR 10x modified  BTB ankle PF 10x  Nustep 5'  Pacing principles/ergo considerations, household tasks   Manual  L LE gentle distraction 30\"x3  Lumbar rot Gr I  30\"x3  Light STM L ITB Manual 20'  L LE gentle distraction 30\"x3  Lumbar rot Gr I  30\"x3  Light STM L ITB Manual 20'  L LE gentle distraction 30\"x3  Lumbar rot Gr I, II  30\"x4  Light STM L ITB Manual 20'  L LE gentle distraction 30\"x3  Lumbar rot Gr I, II  30\"x4  Light STM L ITB Manual 20'  L LE gentle distraction 30\"x3  Lumbar rot Gr I, II  30\"x4  Light STM L ITB   TE  DKTC  Cross over piriformis stretch review  HEP review   TE 10'  Nustep 5'  Passive gentle ham, piriformis stretches  Pelvic tilt 10x  Hep review with options for flexion based stretch ie 3 way prayer sitting TE 10'  Nustep 5'  Pelvic tilt 10x  gastroc stretch L/R 30\"x2 each                HEP: DKTC/SKTC, cross leg stretch, pelvic tilt. Sciatic nerve glides sitting and supine options.  Clam  3/16 TrAb bridge    Charges:  man NRx2       Total Timed Treatment: 45 min  Total Treatment Time: 45 min

## 2023-03-20 ENCOUNTER — TELEPHONE (OUTPATIENT)
Dept: ORTHOPEDICS CLINIC | Facility: CLINIC | Age: 73
End: 2023-03-20

## 2023-03-20 DIAGNOSIS — M54.50 LOW BACK PAIN, UNSPECIFIED BACK PAIN LATERALITY, UNSPECIFIED CHRONICITY, UNSPECIFIED WHETHER SCIATICA PRESENT: Primary | ICD-10-CM

## 2023-03-20 NOTE — TELEPHONE ENCOUNTER
NP Lower back siatica. Please advise if imaging is needed.   Future Appointments   Date Time Provider [place] T[place]ar   3/28/2023 10:30 AM Nikhil, [address] [place]   3/30/2023 10:30 AM Nikhil, [address] [place]   3/31/2023 11:00 AM Perfecto Carrington MD [facility]   4/4/2023 10:30 AM Lexus Nunez, PT [facility]   4/6/2023 10:30 AM Lexus Nunez, PT [facility]   4/7/2023  9:00 AM Sheela Vallejo MD [facility] [id number]   4/11/2023 10:30 AM Lexus Nunez, PT [facility]   4/13/2023 10:30 AM Lexus Nunez, PT [facility]   4/18/2023 10:30 AM Lexus Nunez, PT [facility]   4/20/2023 10:30 AM Lexus Nunez, PT [facility]

## 2023-03-23 ENCOUNTER — OFFICE VISIT (OUTPATIENT)
Dept: PHYSICAL THERAPY | Age: 73
End: 2023-03-23
Attending: NURSE PRACTITIONER
Payer: MEDICARE

## 2023-03-23 PROCEDURE — 97140 MANUAL THERAPY 1/> REGIONS: CPT

## 2023-03-23 PROCEDURE — 97112 NEUROMUSCULAR REEDUCATION: CPT

## 2023-03-23 NOTE — PROGRESS NOTES
Diagnosis:   Spinal stenosis of lumbar region with neurogenic claudication (J14.245)           Referring Provider: Sam Del Rio  Date of Evaluation:    2/28/23    Precautions: drug allergy, tape adhesive, cancer Next MD visit:   3/31 spine  4/7 hip injection  Date of Surgery: n/a   Insurance Primary/Secondary: Mian Beckman / Charlee Cuadra     # Auth Visits: 10            Subjective:  Overall doing much better, however, Sunday terrible pain got up to 7-8/10. Pain shifting to the R, even went down to the ankle. Then woke up Monday a little better. Can't pinpoint why it was so painful that day. Scheduled hip injection for April . Pain: 5-6/10 standing, walking 3-4/10, laying 2-3/10    Objective:   Good pelvic tilt  Lower abs 2/5  3/17/23  SLR  R (-)  L (+) for L buttock and L sided LBP. No radiating N/T today  Antalgic gait, (+) Trendelenburg  L glut med 3-/5 *pain  Lumbar support for pain relief    L (+) SLR  (+) severe tenderness bilat ITB especially proximally. (+) tenderness through LS musculature L>R and into L gluteal area/sciatic nerve   (+) L LE with ext, L SB      Assessment: Pain overall gradually subsiding. Continue to hold sidelying glut strengthening due to limited tolerance due to pain. Pt is tolerating clam well. Good tolerance for low level stabilization exercises without aggravation of pain. Goals:   (to be met in 10 visits)   (I) with HEP  Demonstrate proper body mechanics with functional squat and without c/o pain. Able to tolerate activities in standing/walking for errands 30 mins without aggravation of LE pain   Able to sleep uninterrupted from LE pain. Radicular symptoms abolished with all ADL's. Plan:  Decreased frequency to 1x/week. Hooklying TB, stabilization training. Cont with sciatic nerve glides. Patient will be seen for 1-2 x/week or a total of 10 visits over a 90 day period.  Treatment will include: Gait training, Manual Therapy, Neuromuscular Re-education, Therapeutic Activities, Therapeutic Exercise, Home Exercise Program instruction and IFC/heat/ice if indicated     Date: 3/2/2023  TX#: 2/10 Date:  3/7/23               TX#: 3/10 Date:  3/9/23               TX#: 4/10 Date: 3/14/23                TX#: 5/10 Date: 3/16/23  Tx#: 6/ 3/23/23  7/10     NR  Back care ed transfer training/neutral spine, log roll for bed mobility   TrAb brace hooklying with mini bridge train  Lumbar support - pt owns NR 25'  Back care ed incl neutral spine, hip hinge and log roll with mobility  STS with incr use of LE's  Sciatic nerve glides 10x2 sitting, supine options instructed  Trial lumbar support at home  TrAb bracing train NR 25'  Back care neutral spine transitional movements review  Supine nerve glides 10x  TrAb brace train  Clam 10x2  TrAb with BKFO 10x each  Wt shift -> SLS with support     TE 15'  Nustep 5'  Passive SKTC, piriformis and ham gentle stretches 5'  HEP review          NR 10'  Sciatic neve glides sitting 15x each  TrAb train with hooklying SL march 10x each with breaks     NR 25'  TrAb train:  Modified bridge goal 10x2  HEP review/additions  Sciatic nerve glides 10x2  each  DLHR 10x modified  BTB ankle PF 10x  Nustep 5'  Pacing principles/ergo considerations, household tasks NR  Sciatic nerve glides 10x2 each  TrAb train:  Modified bridge  10x2  TrAb march SL 10x1 each  Cont with clam. still hold S/L hip abd  Passive L hip piriformis, ham stretches     Manual  L LE gentle distraction 30\"x3  Lumbar rot Gr I  30\"x3  Light STM L ITB Manual 20'  L LE gentle distraction 30\"x3  Lumbar rot Gr I  30\"x3  Light STM L ITB Manual 20'  L LE gentle distraction 30\"x3  Lumbar rot Gr I, II  30\"x4  Light STM L ITB Manual 20'  L LE gentle distraction 30\"x3  Lumbar rot Gr I, II  30\"x4  Light STM L ITB Manual 20'  L LE gentle distraction 30\"x3  Lumbar rot Gr I, II  30\"x4  Light STM L ITB Manual 20'  L LE gentle distraction 30\"x3  Lumbar rot Gr I, II  30\"x4  Light STM L ITB     TE  DKTC  Cross over piriformis stretch review  HEP review   TE 10'  Nustep 5'  Passive gentle ham, piriformis stretches  Pelvic tilt 10x  Hep review with options for flexion based stretch ie 3 way prayer sitting TE 10'  Nustep 5'  Pelvic tilt 10x  gastroc stretch L/R 30\"x2 each                      HEP: DKTC/SKTC, cross leg stretch, pelvic tilt. Sciatic nerve glides sitting and supine options. Clam  3/16 TrAb bridge. SL march in [address].      Charges:  man NRx2       Total Timed Treatment: 45 min  Total Treatment Time: 45 min

## 2023-03-28 ENCOUNTER — APPOINTMENT (OUTPATIENT)
Dept: PHYSICAL THERAPY | Age: 73
End: 2023-03-28
Attending: NURSE PRACTITIONER
Payer: MEDICARE

## 2023-03-30 ENCOUNTER — OFFICE VISIT (OUTPATIENT)
Dept: PHYSICAL THERAPY | Age: 73
End: 2023-03-30
Attending: NURSE PRACTITIONER
Payer: MEDICARE

## 2023-03-30 PROCEDURE — 97112 NEUROMUSCULAR REEDUCATION: CPT

## 2023-03-30 PROCEDURE — 97140 MANUAL THERAPY 1/> REGIONS: CPT

## 2023-03-30 NOTE — PROGRESS NOTES
Diagnosis:   Spinal stenosis of lumbar region with neurogenic claudication (L40.081)           Referring Provider: Bruno Juarez  Date of Evaluation:    2/28/23    Precautions: drug allergy, tape adhesive, cancer Next MD visit:   3/31 spine  4/7 hip injection  Date of Surgery: n/a   Insurance Primary/Secondary: MEDICARE / Lynn Sylvester     # Auth Visits: 10            Subjective: \"Overall definite improvement but still a long way to go\". Doing better but still with the L and/or R LE pain. Reports benefits of R hip injection is gone; planned next injection next week. Pretty good day Tuesday, able to wash the floor, iron; took breaks. Still with pain disrupting sleep at night. Pain: 5-6/10 standing, walking 3-4/10, laying 2-3/10. Objective:   Overall decreased tenderness L ITB  Good pelvic tilt  Lower abs 2/5  3/17/23  SLR  R (-)  L (+) for L buttock and L sided LBP. No radiating N/T today  Antalgic gait, (+) Trendelenburg  L glut med 3-/5 *pain  Lumbar support for pain relief    L (+) SLR  (+) severe tenderness bilat ITB especially proximally. (+) tenderness through LS musculature L>R and into L gluteal area/sciatic nerve   (+) L LE with ext, L SB      Assessment: Pt has been able to gradual progress to stabilization exercises. Weight bearing activity/exercise still functionally limited. Emphasis on prioritization of household chores to limit the long hours in standing; pt is incorporating this in her day to day activity with noted benefit. Goals:   (to be met in 10 visits)   (I) with HEP  Demonstrate proper body mechanics with functional squat and without c/o pain. Able to tolerate activities in standing/walking for errands 30 mins without aggravation of LE pain   Able to sleep uninterrupted from LE pain. Radicular symptoms abolished with all ADL's. Plan:  Decreased frequency to 1x/week. Gradual progression to light WB exercise as tolerated. Hooklying TB, stabilization training.     Date: 3/2/2023  TX#: 2/10 Date:  3/7/23               TX#: 3/10 Date:  3/9/23               TX#: 4/10 Date: 3/14/23                TX#: 5/10 Date: 3/16/23  Tx#: 6/ 3/23/23  7/10 3/30/23  8/10      NR  Back care ed transfer training/neutral spine, log roll for bed mobility   TrAb brace hooklying with mini bridge train  Lumbar support - pt owns NR 25'  Back care ed incl neutral spine, hip hinge and log roll with mobility  STS with incr use of LE's  Sciatic nerve glides 10x2 sitting, supine options instructed  Trial lumbar support at home  TrAb bracing train NR 25'  Back care neutral spine transitional movements review  Supine nerve glides 10x  TrAb brace train  Clam 10x2  TrAb with BKFO 10x each  Wt shift -> SLS with support     TE 15'  Nustep 5'  Passive SKTC, piriformis and ham gentle stretches 5'  HEP review          NR 10'  Sciatic neve glides sitting 15x each  TrAb train with hooklying SL march 10x each with breaks     NR 25'  TrAb train:  Modified bridge goal 10x2  HEP review/additions  Sciatic nerve glides 10x2  each  DLHR 10x modified  BTB ankle PF 10x  Nustep 5'  Pacing principles/ergo considerations, household tasks NR  Sciatic nerve glides 10x2 each  TrAb train:  Modified bridge  10x2  TrAb march SL 10x1 each  Cont with clam. still hold S/L hip abd  Passive L hip piriformis, ham stretches NR  Sciatic nerve glides 10x2 each  TrAb train:  glute sets -->  Modified bridge  10x2  TrAb march SL 10x1 each  Passive stretches-B cross over piriformis, hams     Manual  L LE gentle distraction 30\"x3  Lumbar rot Gr I  30\"x3  Light STM L ITB Manual 20'  L LE gentle distraction 30\"x3  Lumbar rot Gr I  30\"x3  Light STM L ITB Manual 20'  L LE gentle distraction 30\"x3  Lumbar rot Gr I, II  30\"x4  Light STM L ITB Manual 20'  L LE gentle distraction 30\"x3  Lumbar rot Gr I, II  30\"x4  Light STM L ITB Manual 20'  L LE gentle distraction 30\"x3  Lumbar rot Gr I, II  30\"x4  Light STM L ITB Manual 20'  L LE gentle distraction 30\"x3  Lumbar rot Gr I, II  30\"x4  Light STM L ITB Manual 15''  Lumbar rot Gr I, II  30\"x4  Light STM L ITB    TE  DKTC  Cross over piriformis stretch review  HEP review   TE 10'  Nustep 5'  Passive gentle ham, piriformis stretches  Pelvic tilt 10x  Hep review with options for flexion based stretch ie 3 way prayer sitting TE 10'  Nustep 5'  Pelvic tilt 10x  gastroc stretch L/R 30\"x2 each                      HEP: DKTC/SKTC, cross leg stretch, pelvic tilt. Sciatic nerve glides sitting and supine options. Cla  3/16 TrAb bridge. SL march in [address].      Charges:  man NRx2       Total Timed Treatment: 45 min  Total Treatment Time: 45 min

## 2023-03-31 ENCOUNTER — OFFICE VISIT (OUTPATIENT)
Dept: ORTHOPEDICS CLINIC | Facility: CLINIC | Age: 73
End: 2023-03-31
Payer: MEDICARE

## 2023-03-31 ENCOUNTER — HOSPITAL ENCOUNTER (OUTPATIENT)
Dept: GENERAL RADIOLOGY | Age: 73
Discharge: HOME | End: 2023-03-31
Attending: STUDENT IN AN ORGANIZED HEALTH CARE EDUCATION/TRAINING PROGRAM
Payer: MEDICARE

## 2023-03-31 VITALS — WEIGHT: 188 LBS | HEIGHT: 62.5 IN | BODY MASS INDEX: 33.73 KG/M2

## 2023-03-31 DIAGNOSIS — M54.50 LOW BACK PAIN, UNSPECIFIED BACK PAIN LATERALITY, UNSPECIFIED CHRONICITY, UNSPECIFIED WHETHER SCIATICA PRESENT: ICD-10-CM

## 2023-03-31 DIAGNOSIS — M48.062 SPINAL STENOSIS OF LUMBAR REGION WITH NEUROGENIC CLAUDICATION: Primary | ICD-10-CM

## 2023-03-31 PROCEDURE — 72100 X-RAY EXAM L-S SPINE 2/3 VWS: CPT | Performed by: STUDENT IN AN ORGANIZED HEALTH CARE EDUCATION/TRAINING PROGRAM

## 2023-04-04 ENCOUNTER — APPOINTMENT (OUTPATIENT)
Dept: PHYSICAL THERAPY | Age: 73
End: 2023-04-04
Attending: NURSE PRACTITIONER
Payer: MEDICARE

## 2023-04-06 ENCOUNTER — OFFICE VISIT (OUTPATIENT)
Dept: PHYSICAL THERAPY | Age: 73
End: 2023-04-06
Attending: NURSE PRACTITIONER
Payer: MEDICARE

## 2023-04-06 PROCEDURE — 97112 NEUROMUSCULAR REEDUCATION: CPT

## 2023-04-06 PROCEDURE — 97140 MANUAL THERAPY 1/> REGIONS: CPT

## 2023-04-06 NOTE — PROGRESS NOTES
Diagnosis:   Spinal stenosis of lumbar region with neurogenic claudication (Y81.864)           Referring Provider: Jhoan Marshall  Date of Evaluation:    2/28/23    Precautions: drug allergy, tape adhesive, cancer Next MD visit:   3/31 spine  4/7 hip injection  Date of Surgery: n/a   Insurance Primary/Secondary: MEDICARE / Bennie Meyer     # Auth Visits: 10            Subjective: Walking is getting better. Standing is still an issue. Injection tomorrow R hip; feels like the last one has worn off. Feels like arthritis all through my legs. Pleased she was able to vacuum the whole house after not being able to for 2 months,     Pain: 5-6/10 standing, walking 3-4/10, laying 2-3/10. Objective:   Overall decreased tenderness L ITB  Good pelvic tilt  Lower abs 2/5  3/17/23  SLR  R (-)  L (+) for L buttock and L sided LBP. No radiating N/T today  Antalgic gait, (+) Trendelenburg  L glut med 3-/5 *pain  Lumbar support for pain relief    L (+) SLR  (+) severe tenderness bilat ITB especially proximally. (+) tenderness through LS musculature L>R and into L gluteal area/sciatic nerve   (+) L LE with ext, L SB      Assessment: Overall improvement but pt still notes some level of constant pain. Hip/ITB and low back involvement. Pt has had follow up with spine surgeon and has been referred to pain management as well as R hip injection scheduled for tomorrow. Excellent follow through with HEP. Pt is tolerating mat exercises well; will still need to transition to weight bearing exercise when appropriate. Standing tolerance remains significantly limited. Goals: All goals in progress.   (to be met in 10 visits)   (I) with HEP  Demonstrate proper body mechanics with functional squat and without c/o pain. Able to tolerate activities in standing/walking for errands 30 mins without aggravation of LE pain   Able to sleep uninterrupted from LE pain. Radicular symptoms abolished with all ADL's.     Plan:  Decreased frequency to 1x/week. Gradual progression to light WB exercise as tolerated. Hooklying TB, stabilization training. Hip injection tomorrow.     Date: 3/2/2023  TX#: 2/10 Date:  3/7/23               TX#: 3/10 Date:  3/9/23               TX#: 4/10 Date: 3/14/23                TX#: 5/10 Date: 3/16/23  Tx#: 6/ 3/23/23  7/10 3/30/23  8/10   4/6/23  9/10   NR  Back care ed transfer training/neutral spine, log roll for bed mobility   TrAb brace hooklying with mini bridge train  Lumbar support - pt owns NR 25'  Back care ed incl neutral spine, hip hinge and log roll with mobility  STS with incr use of LE's  Sciatic nerve glides 10x2 sitting, supine options instructed  Trial lumbar support at home  TrAb bracing train NR 25'  Back care neutral spine transitional movements review  Supine nerve glides 10x  TrAb brace train  Clam 10x2  TrAb with BKFO 10x each  Wt shift -> SLS with support     TE 15'  Nustep 5'  Passive SKTC, piriformis and ham gentle stretches 5'  HEP review          NR 10'  Sciatic neve glides sitting 15x each  TrAb train with hooklying SL march 10x each with breaks     NR 25'  TrAb train:  Modified bridge goal 10x2  HEP review/additions  Sciatic nerve glides 10x2  each  DLHR 10x modified  BTB ankle PF 10x  Nustep 5'  Pacing principles/ergo considerations, household tasks NR  Sciatic nerve glides 10x2 each  TrAb train:  Modified bridge  10x2  TrAb march SL 10x1 each  Cont with clam. still hold S/L hip abd  Passive L hip piriformis, ham stretches NR  Sciatic nerve glides 10x2 each  TrAb train:  glute sets -->  Modified bridge  10x2  TrAb march SL 10x1 each  Passive stretches-B cross over piriformis, hams  NR  Sciatic nerve glides 15x  Bridge 15x  TrAb hooklying single leg 20x each --> progress to SL ext  HEP review  Neutral spine sitting/standing considerations, reviewed pacing princples   Manual  L LE gentle distraction 30\"x3  Lumbar rot Gr I  30\"x3  Light STM L ITB Manual 20'  L LE gentle distraction 30\"x3  Lumbar rot Gr I  30\"x3  Light STM L ITB Manual 20'  L LE gentle distraction 30\"x3  Lumbar rot Gr I, II  30\"x4  Light STM L ITB Manual 20'  L LE gentle distraction 30\"x3  Lumbar rot Gr I, II  30\"x4  Light STM L ITB Manual 20'  L LE gentle distraction 30\"x3  Lumbar rot Gr I, II  30\"x4  Light STM L ITB Manual 20'  L LE gentle distraction 30\"x3  Lumbar rot Gr I, II  30\"x4  Light STM L ITB Manual 15''  Lumbar rot Gr I, II  30\"x4  Light STM L ITB Manual 15''  Lumbar rot Gr II  30\"x4  STM L ITB   TE  DKTC  Cross over piriformis stretch review  HEP review   TE 10'  Nustep 5'  Passive gentle ham, piriformis stretches  Pelvic tilt 10x  Hep review with options for flexion based stretch ie 3 way prayer sitting TE 10'  Nustep 5'  Pelvic tilt 10x  gastroc stretch L/R 30\"x2 each                      HEP: DKTC/SKTC, cross leg stretch, pelvic tilt. Sciatic nerve glides sitting and supine options. Clam  3/16 TrAb bridge.  SL march in hooklying. -->SL ext w progress to [place] L1 as fannie    Charges:  man NRx2       Total Timed Treatment: 45 min  Total Treatment Time: 45 min

## 2023-04-07 ENCOUNTER — OFFICE VISIT (OUTPATIENT)
Dept: ORTHOPEDICS CLINIC | Facility: CLINIC | Age: 73
End: 2023-04-07
Payer: MEDICARE

## 2023-04-07 VITALS — WEIGHT: 182 LBS | HEART RATE: 86 BPM | OXYGEN SATURATION: 98 % | BODY MASS INDEX: 32.65 KG/M2 | HEIGHT: 62.5 IN

## 2023-04-07 DIAGNOSIS — M16.11 PRIMARY OSTEOARTHRITIS OF RIGHT HIP: Primary | ICD-10-CM

## 2023-04-07 RX ORDER — TRIAMCINOLONE ACETONIDE 40 MG/ML
40 INJECTION, SUSPENSION INTRA-ARTICULAR; INTRAMUSCULAR ONCE
Status: COMPLETED | OUTPATIENT
Start: 2023-04-07 | End: 2023-04-07

## 2023-04-07 RX ADMIN — TRIAMCINOLONE ACETONIDE 40 MG: 40 INJECTION, SUSPENSION INTRA-ARTICULAR; INTRAMUSCULAR at 09:45:00

## 2023-04-07 NOTE — PROCEDURES
She had great relief from the last right hip injection, would like to repeat today. After informed consent, the patient's right hip was marked, prepped with topical antiseptic, and locally anesthetized with skin refrigerant followed by injection of 1% lidocaine to create a skin wheal anteriorly at an insertion site a few fingerbreadths lateral to the femoral pulse, along the trajectory of the femoral neck. Next, the area was re-prepped with topical antiseptic, and ultrasound guidance was performed to direct a 20 gauge spinal needle into the hip joint at the junction of the femoral head and neck, after which a mixture of 1mL 40mg/mL Kenalog, 2mL 1% lidocaine and 2mL 0.5% marcaine was injected while visualizing the fluid under ultrasound. Confirmatory imaging was saved to the patient's chart. A band-aid was applied. The patient tolerated the procedure well. She is also having pain in her left hip, both knees, and both ankles. She is unsure whether this may be related to chemo. She can be evaluated for her hip and both knees, I am out for the next 2 weeks but we can set her up with Deejay Situ for evaluation. For the ankles, did advise following up with Dr. Brandon Qureshi.     Remberto Couch MD, [address] [facility]  Phone [phone]  Fax [phone]

## 2023-04-11 ENCOUNTER — OFFICE VISIT (OUTPATIENT)
Dept: PHYSICAL THERAPY | Age: 73
End: 2023-04-11
Attending: NURSE PRACTITIONER
Payer: MEDICARE

## 2023-04-11 PROCEDURE — 97112 NEUROMUSCULAR REEDUCATION: CPT

## 2023-04-11 PROCEDURE — 97140 MANUAL THERAPY 1/> REGIONS: CPT

## 2023-04-11 NOTE — PROGRESS NOTES
Diagnosis:   Spinal stenosis of lumbar region with neurogenic claudication (X52.878)           Referring Provider: Daryle Schwalbe 3/31/23 specialist Christian Bolden Date of Evaluation:    2/28/23    Precautions: drug allergy, tape adhesive, cancer Next MD visit:   3/31 spine specialist  4/7 hip injection  Date of Surgery: n/a   Insurance Primary/Secondary: MEDICARE / [employer]     # Auth Visits: 10           Progress Summary  Pt has attended 10 visits in Physical Therapy. 0 cancels 0 no shows. New PT order also received from spine specialist.  Subjective: Pt reports the physical therapy has been helpful. \"I can stand and walk longer\". Walks in the store for short errands now, still needing to use the cart which helps with pain. Recent R hip injection 4 days ago with some help;reports significant relief from the prior one 3 months ago. Reports she has also been referred to pain management and will schedule this as well. X rays LE scheduled for next week. Pain: 5-6/10 standing, 6/10 at worst and these episodes \"not as frequent\". walking 3/10, laying 2/10 at best.   Tylenol when the pain reaches 5-6/10. Less Tylenol needed overall. Objective:   SLR  R (-)  L (+) for L buttock and L sided LBP. More centralized. No radiating N/T   Gait: antalgic, (+) Trendelenburg    ROM: segmental hypomobility consistent with diffuse DDD           Relief with flexion based exercises. Extension, L sidebend (+) L LBP    Palpation:  (+) tenderness multiple muscle groups. Significant decreased tenderness L ITB    Strength:  Distal myotomes strong/equal                 Core/gluteal weakness bilat                 Glut med R 3-/5, L 3-/5 *pain    Wears external lumbar support as needed for pain relief    Assessment: Overall improvement but pt still notes some level of constant pain. Bilat hip, ITB and low back involvement. Radiating symptoms L and/or R LE are more centralized. Relief with flexion based stretches.    Pt has had follow up with spine surgeon and has been referred to pain management as well as R hip injection completed 4 days ago. Excellent follow through with HEP and we have been able to gradually progress intensity of exercises. We are now able to progress Functionally pt continues to be to limited with weight bearing tolerance. She will benefit from additional PT to continue to progress exercises, as she follows up with pain management. Goals:    Progress made toward all goals. (to be met in 10 visits)   (I) with HEP  Demonstrate proper body mechanics with functional squat and without c/o pain. -->partially met; depending on the level of weight bearing activity for the day. Able to tolerate activities in standing/walking for errands 30 mins without aggravation of LE pain   Able to sleep uninterrupted from LE pain. Radicular symptoms abolished with all ADL's. Plan:  Pt has completed initial 10/10 visits.   Recommend an additional 6 visits, if needed, to be spread out over the next couple months, as she follows up with pain management, and to progress strengthening program.  Date:  3/9/23               TX#: 4/10 Date: 3/14/23                TX#: 5/10 Date: 3/16/23  Tx#: 6/ 3/23/23  7/10 3/30/23  8/10   4/6/23  9/10 4/11/23  10/10    NR 25'  Back care neutral spine transitional movements review  Supine nerve glides 10x  TrAb brace train  Clam 10x2  TrAb with BKFO 10x each  Wt shift -> SLS with support     TE 15'  Nustep 5'  Passive SKTC, piriformis and ham gentle stretches 5'  HEP review          NR 10'  Sciatic neve glides sitting 15x each  TrAb train with hooklying SL march 10x each with breaks     NR 25'  TrAb train:  Modified bridge goal 10x2  HEP review/additions  Sciatic nerve glides 10x2  each  DLHR 10x modified  BTB ankle PF 10x  Nustep 5'  Pacing principles/ergo considerations, household tasks NR  Sciatic nerve glides 10x2 each  TrAb train:  Modified bridge  10x2  TrAb march SL 10x1 each  Cont with clam. still hold S/L hip abd  Passive L hip piriformis, ham stretches NR  Sciatic nerve glides 10x2 each  TrAb train:  glute sets -->  Modified bridge  10x2  TrAb march SL 10x1 each  Passive stretches-B cross over piriformis, hams  NR  Sciatic nerve glides 15x  Bridge 15x  TrAb hooklying single leg 20x each --> progress to SL ext  HEP review  Neutral spine sitting/standing considerations, reviewed pacing princples   NR  Pelvic tilt 5x  TrAb brace SL ext 10x2  Sahrmann L 1 progression 5x each    Reassess  Short walks as fannie  Wall push up 10x--> progression to counter at home  STS hip hinge 10x    SB :  DKTC 10x2  bridge LE'S extended    Manual 20'  L LE gentle distraction 30\"x3  Lumbar rot Gr I, II  30\"x4  Light STM L ITB Manual 20'  L LE gentle distraction 30\"x3  Lumbar rot Gr I, II  30\"x4  Light STM L ITB Manual 20'  L LE gentle distraction 30\"x3  Lumbar rot Gr I, II  30\"x4  Light STM L ITB Manual 20'  L LE gentle distraction 30\"x3  Lumbar rot Gr I, II  30\"x4  Light STM L ITB Manual 15''  Lumbar rot Gr I, II  30\"x4  Light STM L ITB Manual 15''  Lumbar rot Gr II  30\"x4  STM L ITB Manual 15''  Lumbar rot Gr II  30\"x4  STM L ITB    TE 10'  Nustep 5'  Pelvic tilt 10x  gastroc stretch L/R 30\"x2 each                      HEP: DKTC/SKTC, cross leg stretch, pelvic tilt. Sciatic nerve glides sitting and supine options. Clam  3/16 TrAb bridge. SL march in hooklying. -->SL ext w progress to Sahrmann L1 as fannie. Short walks.  Wall push up/counter    Charges:  man NRx2       Total Timed Treatment: 45 min  Total Treatment Time: 45 min

## 2023-04-12 ENCOUNTER — TELEPHONE (OUTPATIENT)
Dept: ORTHOPEDICS CLINIC | Facility: CLINIC | Age: 73
End: 2023-04-12

## 2023-04-12 DIAGNOSIS — M25.561 RIGHT KNEE PAIN, UNSPECIFIED CHRONICITY: ICD-10-CM

## 2023-04-12 DIAGNOSIS — M25.572 BILATERAL ANKLE PAIN, UNSPECIFIED CHRONICITY: Primary | ICD-10-CM

## 2023-04-12 DIAGNOSIS — M25.562 LEFT KNEE PAIN, UNSPECIFIED CHRONICITY: Primary | ICD-10-CM

## 2023-04-12 DIAGNOSIS — M25.571 BILATERAL ANKLE PAIN, UNSPECIFIED CHRONICITY: Primary | ICD-10-CM

## 2023-04-12 NOTE — TELEPHONE ENCOUNTER
Future Appointments   Date Time Provider Gonzales Tovar   4/20/2023  3:15 PM Phill Cole, [place] [id number]     [facility] to schedule her xray or come in prior to appt.

## 2023-04-12 NOTE — TELEPHONE ENCOUNTER
Patient scheduled with Dr. Irma Epps for PAULINA ankle, no recent imaging in epic.      Future Appointments   Date Time Provider Gonzales La   4/18/2023 10:30 AM Nikhil, [address] [place]   4/20/2023  3:15 PM Solis Reyna DPM [facility] [id number]

## 2023-04-13 ENCOUNTER — APPOINTMENT (OUTPATIENT)
Dept: PHYSICAL THERAPY | Age: 73
End: 2023-04-13
Attending: NURSE PRACTITIONER
Payer: MEDICARE

## 2023-04-18 ENCOUNTER — OFFICE VISIT (OUTPATIENT)
Dept: PHYSICAL THERAPY | Age: 73
End: 2023-04-18
Attending: NURSE PRACTITIONER
Payer: MEDICARE

## 2023-04-18 PROCEDURE — 97140 MANUAL THERAPY 1/> REGIONS: CPT

## 2023-04-18 PROCEDURE — 97112 NEUROMUSCULAR REEDUCATION: CPT

## 2023-04-18 NOTE — PROGRESS NOTES
Diagnosis:   Spinal stenosis of lumbar region with neurogenic claudication (X00.594)           Referring Provider: Esperanza Barth 3/31/23 specialist Tari Mak Date of Evaluation:    2/28/23    Precautions: drug allergy, tape adhesive, cancer Next MD visit:   3/31 spine specialist  4/7 hip injection  Date of Surgery: n/a   Insurance Primary/Secondary: Meredith Davalos / Heidy Gonzales     # Auth Visits: 10          PT order also received from spine specialist.  Subjective: Pt reports last Thursday was the best day since January. Able to walk, every second day, at the grocery store, able to be more upright. Very difficult to get up off the floor, stairs still challenging. Pain: 5-6/10 standing, 6/10 at worst and these episodes \"not as frequent\". walking 3/10, laying 2/10 at best.   Tylenol when the pain reaches 5-6/10. Less Tylenol needed overall. Objective:   SLR  R (-)  L (+) for L buttock and L sided LBP. More centralized. No radiating N/T   Gait: antalgic, (+) Trendelenburg    ROM: segmental hypomobility consistent with diffuse DDD           Relief with flexion based exercises. Extension, L sidebend (+) L LBP    Palpation:  (+) tenderness multiple muscle groups. Significant decreased tenderness L ITB    Strength:  Distal myotomes strong/equal                 Core/gluteal weakness bilat                 Glut med R 3-/5, L 3-/5 *pain    Wears external lumbar support as needed for pain relief    Assessment: Pt has noted significant progress overall and functional gains made with tolerance for standing. She feels like she has finally turned a corner. She is following through with pacing principles and adaptations to her day for improved tolerance for functional activities. Advanced stabilization exercises today which pt tolerated well. PN:  Bilat hip, ITB and low back involvement. Radiating symptoms L and/or R LE are more centralized. Relief with flexion based stretches.    Pt has had follow up with spine surgeon and has been referred to pain management as well as R hip injection completed 4 days ago. Excellent follow through with HEP and we have been able to gradually progress intensity of exercises. We are now able to progress Functionally pt continues to be to limited with weight bearing tolerance. She will benefit from additional PT to continue to progress exercises, as she follows up with pain management. Goals:    Progress made toward all goals. (to be met in 10 visits)   (I) with HEP  Demonstrate proper body mechanics with functional squat and without c/o pain. -->partially met; depending on the level of weight bearing activity for the day. Able to tolerate activities in standing/walking for errands 30 mins without aggravation of LE pain   Able to sleep uninterrupted from LE pain. Radicular symptoms abolished with all ADL's.     Plan: Cont PT 6 visits, if needed, to be spread out over the next couple months, as she follows up with pain management, and to progress strengthening program.  Date: 3/16/23  Tx#: 6/ 3/23/23  7/10 3/30/23  8/10   4/6/23  9/10 4/11/23  10/10 4/18/23  11/16     NR 25'  TrAb train:  Modified bridge goal 10x2  HEP review/additions  Sciatic nerve glides 10x2  each  DLHR 10x modified  BTB ankle PF 10x  Nustep 5'  Pacing principles/ergo considerations, household tasks NR  Sciatic nerve glides 10x2 each  TrAb train:  Modified bridge  10x2  TrAb march SL 10x1 each  Cont with clam. still hold S/L hip abd  Passive L hip piriformis, ham stretches NR  Sciatic nerve glides 10x2 each  TrAb train:  glute sets -->  Modified bridge  10x2  TrAb march SL 10x1 each  Passive stretches-B cross over piriformis, hams  NR  Sciatic nerve glides 15x  Bridge 15x  TrAb hooklying single leg 20x each --> progress to SL ext  HEP review  Neutral spine sitting/standing considerations, reviewed pacing princples   NR  Pelvic tilt 5x  TrAb brace SL ext 10x2  Sahrmann L 1 progression 5x each    Reassess  Short walks as fannie  Wall push up 10x--> progression to counter at home  STS hip hinge 10x    SB :  DKTC 10x2  bridge LE'S extended NR    TrAb control - progress to Sahrmann L1  Bridge - incr range  Clam 20x - progression to increased LE's ext  Cardio program considerations, ie consider stat bike, Nustep to start. STS eccen control 5x2   Modified bridge/SLR    Manual 20'  L LE gentle distraction 30\"x3  Lumbar rot Gr I, II  30\"x4  Light STM L ITB Manual 20'  L LE gentle distraction 30\"x3  Lumbar rot Gr I, II  30\"x4  Light STM L ITB Manual 15''  Lumbar rot Gr I, II  30\"x4  Light STM L ITB Manual 15''  Lumbar rot Gr II  30\"x4  STM L ITB Manual 15''  Lumbar rot Gr II  30\"x4  STM L ITB Manual 15''  Lumbar rot Gr II  30\"x4  STM L ITB                         HEP: DKTC/SKTC, cross leg stretch, pelvic tilt. Sciatic nerve glides sitting and supine options. Clam  3/16 TrAb bridge. SL march in hooklying. -->SL ext w progress to Sahrmann L1 as fannie. Short walks.  Wall push up/counter    Charges:  man NRx2       Total Timed Treatment: 45 min  Total Treatment Time: 45 min

## 2023-04-20 ENCOUNTER — APPOINTMENT (OUTPATIENT)
Dept: PHYSICAL THERAPY | Age: 73
End: 2023-04-20
Attending: NURSE PRACTITIONER
Payer: MEDICARE

## 2023-04-20 ENCOUNTER — HOSPITAL ENCOUNTER (OUTPATIENT)
Dept: GENERAL RADIOLOGY | Age: 73
Discharge: HOME | End: 2023-04-20
Attending: PODIATRIST
Payer: MEDICARE

## 2023-04-20 ENCOUNTER — OFFICE VISIT (OUTPATIENT)
Dept: ORTHOPEDICS CLINIC | Facility: CLINIC | Age: 73
End: 2023-04-20
Payer: MEDICARE

## 2023-04-20 VITALS — WEIGHT: 178.63 LBS | BODY MASS INDEX: 32.05 KG/M2 | HEIGHT: 62.5 IN

## 2023-04-20 DIAGNOSIS — M25.471 SWELLING OF BOTH ANKLES: ICD-10-CM

## 2023-04-20 DIAGNOSIS — M25.572 CHRONIC ANKLE PAIN, BILATERAL: Primary | ICD-10-CM

## 2023-04-20 DIAGNOSIS — M25.572 BILATERAL ANKLE PAIN, UNSPECIFIED CHRONICITY: ICD-10-CM

## 2023-04-20 DIAGNOSIS — M25.472 SWELLING OF BOTH ANKLES: ICD-10-CM

## 2023-04-20 DIAGNOSIS — M72.2 PLANTAR FASCIITIS: ICD-10-CM

## 2023-04-20 DIAGNOSIS — M21.42 PES PLANUS OF BOTH FEET: ICD-10-CM

## 2023-04-20 DIAGNOSIS — M25.571 BILATERAL ANKLE PAIN, UNSPECIFIED CHRONICITY: ICD-10-CM

## 2023-04-20 DIAGNOSIS — M77.30 CALCANEAL SPUR, UNSPECIFIED LATERALITY: ICD-10-CM

## 2023-04-20 DIAGNOSIS — M25.571 CHRONIC ANKLE PAIN, BILATERAL: Primary | ICD-10-CM

## 2023-04-20 DIAGNOSIS — M21.619 BUNION: ICD-10-CM

## 2023-04-20 DIAGNOSIS — M21.41 PES PLANUS OF BOTH FEET: ICD-10-CM

## 2023-04-20 DIAGNOSIS — G62.9 NEUROPATHY: ICD-10-CM

## 2023-04-20 DIAGNOSIS — G89.29 CHRONIC ANKLE PAIN, BILATERAL: Primary | ICD-10-CM

## 2023-04-20 PROBLEM — M21.40 FLAT FOOT: Status: ACTIVE | Noted: 2023-04-20

## 2023-04-20 PROCEDURE — 73610 X-RAY EXAM OF ANKLE: CPT | Performed by: PODIATRIST

## 2023-04-20 PROCEDURE — 99204 OFFICE O/P NEW MOD 45 MIN: CPT | Performed by: PODIATRIST

## 2023-04-20 PROCEDURE — 1125F AMNT PAIN NOTED PAIN PRSNT: CPT | Performed by: PODIATRIST

## 2023-04-21 ENCOUNTER — HOSPITAL ENCOUNTER (OUTPATIENT)
Dept: GENERAL RADIOLOGY | Age: 73
Discharge: HOME | End: 2023-04-21
Attending: PHYSICIAN ASSISTANT
Payer: MEDICARE

## 2023-04-21 ENCOUNTER — OFFICE VISIT (OUTPATIENT)
Dept: ORTHOPEDICS CLINIC | Facility: CLINIC | Age: 73
End: 2023-04-21
Payer: MEDICARE

## 2023-04-21 VITALS — WEIGHT: 178.63 LBS | OXYGEN SATURATION: 98 % | HEART RATE: 81 BPM | BODY MASS INDEX: 32.05 KG/M2 | HEIGHT: 62.5 IN

## 2023-04-21 DIAGNOSIS — M25.562 LEFT KNEE PAIN, UNSPECIFIED CHRONICITY: ICD-10-CM

## 2023-04-21 DIAGNOSIS — M17.0 PRIMARY OSTEOARTHRITIS OF BOTH KNEES: Primary | ICD-10-CM

## 2023-04-21 DIAGNOSIS — M25.561 RIGHT KNEE PAIN, UNSPECIFIED CHRONICITY: ICD-10-CM

## 2023-04-21 PROCEDURE — 73564 X-RAY EXAM KNEE 4 OR MORE: CPT | Performed by: PHYSICIAN ASSISTANT

## 2023-04-21 PROCEDURE — 99213 OFFICE O/P EST LOW 20 MIN: CPT | Performed by: PHYSICIAN ASSISTANT

## 2023-04-21 NOTE — PROGRESS NOTES
[facility] Progress Note    Subjective: Patient returns to clinic after last being seen by Dr. Bert Quiroz for her hip. She reports bilateral knee pain, right greater than left for several years. She notes that the pain occurs along the bilateral joint lines as well as proximal to the kneecap. Pain worsens with standing and walking and leads to episodes of instability where it feels like the knees will give out. She has been taking extra strength Tylenol which helps to a mild degree. She generally avoids NSAIDs due to reduced renal function secondary to chemotherapy for breast cancer. She is currently undergoing physical therapy for her lower extremities and reports that just about 1 week ago they began helping for her knee pain. Denies any previous injections or surgeries for either knee. Objective: Patient seated comfortably in the exam chair. Pleasant, conversational, verbose. Alert and oriented x3. Nonlabored breathing. Grossly neurologically intact. Bilateral knees extend fully to 0 degrees and flex to approximately 120 degrees. There is significant tenderness palpation along the bilateral joint lines of both knees. No tenderness to popliteal spaces. Knees are stable to valgus and varus stresses at 0 and 30 degrees. No laxity with anterior/posterior drawer. Imaging: Radiographs of both knees obtained today personally viewed, independently interpreted and radiology report read. Radiographs demonstrates moderate to severe patellofemoral joint space narrowing of both knees as well as right greater than left moderate medial compartmental joint space narrowing. Assessment/Plan: Patient with bilateral knee pain with symptoms most consistent with symptomatic radiographically moderate bilateral knee osteoarthritis. I discussed the etiology, natural history, and management options for symptomatic knee osteoarthritis.   I discussed nonsurgical and surgical treatments, with nonsurgical treatments to include anti-inflammatory medications, injections, activity modification, weight loss, low impact exercise and possible therapy. Surgery would be with knee replacement and is an elective operation reserved for when nonsurgical treatments no longer alleviate symptoms sufficiently. The patient ultimately would like to undergo injections but would like to do so closer to her trip to [facility]) in July. She has appointment scheduled with Dr. Perfecto Peña for injection into her hip in early July and I discussed scheduling appointments close to that date for her knees. A knee conditioning packet was also provided to the patient in clinic today. Her questions were sought and answered satisfactorily. She is happy with the plan and will follow as advised. Linh Rodgers PA-C  [facility]    This note was dictated using Dragon software. While it was briefly proofread prior to completion, some grammatical, spelling, and word choice errors due to dictation may still occur.

## 2023-04-25 ENCOUNTER — OFFICE VISIT (OUTPATIENT)
Dept: PHYSICAL THERAPY | Age: 73
End: 2023-04-25
Attending: NURSE PRACTITIONER
Payer: MEDICARE

## 2023-04-25 PROCEDURE — 97140 MANUAL THERAPY 1/> REGIONS: CPT

## 2023-04-25 PROCEDURE — 97112 NEUROMUSCULAR REEDUCATION: CPT

## 2023-04-25 NOTE — PROGRESS NOTES
Diagnosis:   Spinal stenosis of lumbar region with neurogenic claudication (H03.627)           Referring Provider: Rashida Bentley 3/31/23 specialist Britney Graf Date of Evaluation:    2/28/23    Precautions: drug allergy, tape adhesive, cancer Next MD visit:   3/31 spine specialist  4/7 hip injection  Date of Surgery: n/a   Insurance Primary/Secondary: MEDICARE / [employer]     # Auth Visits: 10          PT order also received from spine specialist.  Subjective: Xrays ankles last week, no significant arthritis. Appt with ortho for possible knee injections. And X ray L hip planned. Increasing steps; able to 3000, 5000 and then third day 6000; \"able to do but pretty sore and tired\". Pain: 5-6/10 standing, 6/10 at worst and these episodes \"not as frequent\". walking 3/10, laying 2/10 at best.   Tylenol when the pain reaches 5-6/10. Less Tylenol needed overall. Objective:   SLR  R (-)  L (+) for L buttock and L sided LBP. More centralized. No radiating N/T   Gait: antalgic, (+) Trendelenburg    ROM: segmental hypomobility consistent with diffuse DDD           Relief with flexion based exercises. Extension, L sidebend (+) L LBP    Palpation:  (+) tenderness multiple muscle groups. Significant decreased tenderness L ITB    Strength:  Distal myotomes strong/equal                 Core/gluteal weakness bilat                 Glut med R 3-/5, L 3-/5 *pain    Wears external lumbar support as needed for pain relief    Assessment: Pt has noted significant progress overall; increased walking tolerated with less cart support. Some relief with hip injection 2 weeks ago. Pt continues to make steady functional gains; For the first time, she has been able to complete short errands without full reliance on a cart. PN:  Bilat hip, ITB and low back involvement. Radiating symptoms L and/or R LE are more centralized. Relief with flexion based stretches.    Pt has had follow up with spine surgeon and has been referred to pain management as well as R hip injection completed 4 days ago. Excellent follow through with HEP and we have been able to gradually progress intensity of exercises. We are now able to progress Functionally pt continues to be to limited with weight bearing tolerance. She will benefit from additional PT to continue to progress exercises, as she follows up with pain management. Goals:    Progress made toward all goals. (to be met in 10 visits)   (I) with HEP  Demonstrate proper body mechanics with functional squat and without c/o pain. -->partially met; depending on the level of weight bearing activity for the day. Able to tolerate activities in standing/walking for errands 30 mins without aggravation of LE pain   Able to sleep uninterrupted from LE pain. Radicular symptoms abolished with all ADL's.     Plan: Cont PT 1x/week or 2 to progress exercise regimen, as she follows up with pain management, and to progress strengthening program.  Date: 3/16/23  Tx#: 6/ 3/23/23  7/10 3/30/23  8/10   4/6/23  9/10 4/11/23  10/10 4/18/23  11/16 4/25/23  12/16    NR 25'  TrAb train:  Modified bridge goal 10x2  HEP review/additions  Sciatic nerve glides 10x2  each  DLHR 10x modified  BTB ankle PF 10x  Nustep 5'  Pacing principles/ergo considerations, household tasks NR  Sciatic nerve glides 10x2 each  TrAb train:  Modified bridge  10x2  TrAb march SL 10x1 each  Cont with clam. still hold S/L hip abd  Passive L hip piriformis, ham stretches NR  Sciatic nerve glides 10x2 each  TrAb train:  glute sets -->  Modified bridge  10x2  TrAb march SL 10x1 each  Passive stretches-B cross over piriformis, hams  NR  Sciatic nerve glides 15x  Bridge 15x  TrAb hooklying single leg 20x each --> progress to SL ext  HEP review  Neutral spine sitting/standing considerations, reviewed pacing princples   NR  Pelvic tilt 5x  TrAb brace SL ext 10x2  Sahrmann L 1 progression 5x each    Reassess  Short walks as fannie  Wall push up 10x--> progression to counter at home  STS hip hinge 10x    SB :  DKTC 10x2  bridge LE'S extended NR    TrAb control - progress to Sahrmann L1  Bridge - incr range  Clam 20x - progression to increased LE's ext  Cardio program considerations, ie consider stat bike, Nustep to start. STS eccen control 5x2   NR  TrAb control - Sahrmann L1 10x  Bridge - progress to higher lift. 20x    STS 10x2  Balance in corner - L/R semi tandem--> tandem progressions  HEP upgrades as below  -DLHR 10  -walking program  -graded return to low impact ex class. .  Modified bridge/SLR   Manual 20'  L LE gentle distraction 30\"x3  Lumbar rot Gr I, II  30\"x4  Light STM L ITB Manual 20'  L LE gentle distraction 30\"x3  Lumbar rot Gr I, II  30\"x4  Light STM L ITB Manual 15''  Lumbar rot Gr I, II  30\"x4  Light STM L ITB Manual 15''  Lumbar rot Gr II  30\"x4  STM L ITB Manual 15''  Lumbar rot Gr II  30\"x4  STM L ITB Manual 15''  Lumbar rot Gr II  30\"x4  STM L ITB Manual 15''  Lumbar rot Gr II  30\"x4  STM L ITB                        HEP: DKTC/SKTC, cross leg stretch, pelvic tilt. Sciatic nerve glides sitting and supine options. Clam  3/16 TrAb bridge. SL march in hooklying. -->SL ext w progress to Sahrmann L1 as fannie. Short walks.  Wall push up/counter  DLHR, corner semi/tandem stance    Charges:  man NRx2       Total Timed Treatment: 45 min  Total Treatment Time: 45 min

## 2023-05-02 ENCOUNTER — OFFICE VISIT (OUTPATIENT)
Dept: PHYSICAL THERAPY | Age: 73
End: 2023-05-02
Attending: NURSE PRACTITIONER
Payer: MEDICARE

## 2023-05-02 PROCEDURE — 97112 NEUROMUSCULAR REEDUCATION: CPT

## 2023-05-02 PROCEDURE — 97110 THERAPEUTIC EXERCISES: CPT

## 2023-05-02 PROCEDURE — 97140 MANUAL THERAPY 1/> REGIONS: CPT

## 2023-05-02 NOTE — PROGRESS NOTES
Diagnosis:   Spinal stenosis of lumbar region with neurogenic claudication (Q83.034)           Referring Provider: Aruna Verma 3/31/23 specialist Paul Urrutia Date of Evaluation:    2/28/23    Precautions: drug allergy, tape adhesive, cancer Next MD visit:   3/31 spine specialist  4/7 hip injection  Date of Surgery: n/a   Insurance Primary/Secondary: Atif Zamora / Ashkan Parsons     # Auth Visits: 10          PT order also received from spine specialist.  Subjective:   Walked 6500 steps Friday, walked in the city, exhausted the next day. Able to do it but soreness Sat and Sun 4-5/10. Ankle pain still on and off, can be severe. Knees arthritis. Pain: 5-6/10 standing, 6/10 at worst and these episodes \"not as frequent\". walking 3/10, laying 2/10 at best.   Tylenol when the pain reaches 5-6/10. Less Tylenol needed overall. Objective:   Glut med 3-/5, overuse of hip flexors. SLR  R (-)  L (+) for L buttock and L sided LBP. More centralized. No radiating N/T   Gait: antalgic, (+) Trendelenburg    ROM: segmental hypomobility consistent with diffuse DDD           Relief with flexion based exercises. Extension, L sidebend (+) L LBP    Palpation:  (+) tenderness multiple muscle groups. Significant decreased tenderness L ITB    Strength:  Distal myotomes strong/equal                 Core/gluteal weakness bilat                 Glut med R 3-/5, L 3-/5 *pain    Wears external lumbar support as needed for pain relief    Assessment: Able to gradually progress to more WB exercise. Functionally pt is able to walk more; caution still given to avoid overdoing it. Benefit noted with new more supportive footwear. Advanced HEP as below which pt was able to tolerate with appropriate fatigue. PN:  Bilat hip, ITB and low back involvement. Radiating symptoms L and/or R LE are more centralized. Relief with flexion based stretches.    Pt has had follow up with spine surgeon and has been referred to pain management as well as R hip injection completed 4 days ago. Excellent follow through with HEP and we have been able to gradually progress intensity of exercises. We are now able to progress Functionally pt continues to be to limited with weight bearing tolerance. She will benefit from additional PT to continue to progress exercises, as she follows up with pain management. Goals:    Progress made toward all goals. (to be met in 10 visits)   (I) with HEP  Demonstrate proper body mechanics with functional squat and without c/o pain. -->partially met; depending on the level of weight bearing activity for the day. Able to tolerate activities in standing/walking for errands 30 mins without aggravation of LE pain   Able to sleep uninterrupted from LE pain. Radicular symptoms abolished with all ADL's. Plan: Review HEP additions next visit. Graded return to [facility].  Cont PT 1x/week or 2 to progress exercise regimen, as she follows up with pain management, and to progress strengthening program.  Date: 3/16/23  Tx#: 6/ 3/23/23  7/10 3/30/23  8/10   4/6/23  9/10 4/11/23  10/10 4/18/23  11/16 4/25/23  12/16 5/2/23  13/16   NR 25'  TrAb train:  Modified bridge goal 10x2  HEP review/additions  Sciatic nerve glides 10x2  each  DLHR 10x modified  BTB ankle PF 10x  Nustep 5'  Pacing principles/ergo considerations, household tasks NR  Sciatic nerve glides 10x2 each  TrAb train:  Modified bridge  10x2  TrAb march SL 10x1 each  Cont with clam. still hold S/L hip abd  Passive L hip piriformis, ham stretches NR  Sciatic nerve glides 10x2 each  TrAb train:  glute sets -->  Modified bridge  10x2  TrAb march SL 10x1 each  Passive stretches-B cross over piriformis, hams  NR  Sciatic nerve glides 15x  Bridge 15x  TrAb hooklying single leg 20x each --> progress to SL ext  HEP review  Neutral spine sitting/standing considerations, reviewed pacing princples   NR  Pelvic tilt 5x  TrAb brace SL ext 10x2  Sahrmann L 1 progression 5x each    Reassess  Short walks as fannie  Wall push up 10x--> progression to counter at home  STS hip hinge 10x    SB :  DKTC 10x2  bridge LE'S extended NR    TrAb control - progress to Sahrmann L1  Bridge - incr range  Clam 20x - progression to increased LE's ext  Cardio program considerations, ie consider stat bike, Nustep to start. STS eccen control 5x2   NR  TrAb control - Sahrmann L1 10x  Bridge - progress to higher lift. 20x    STS 10x2  Balance in corner - L/R semi tandem--> tandem progressions  HEP upgrades as below  -DLHR 10  -walking program  -graded return to low impact ex class. Georgiana Daft  Nustep 5'     DLHR 10x2  Shuttle 4 bands DL 10x3    NR  Bridge 10 x-->  Modified bridge/SLR on table 10x each  Side glut med 10x  Rhomberg EC 30\"x3  Foam Rhomberg: -head turns 10x,   -EC trials with SBA   Graded return to cardio/Melchor, safety awareness with balance/modifications as needed. Manual 20'  L LE gentle distraction 30\"x3  Lumbar rot Gr I, II  30\"x4  Light STM L ITB Manual 20'  L LE gentle distraction 30\"x3  Lumbar rot Gr I, II  30\"x4  Light STM L ITB Manual 15''  Lumbar rot Gr I, II  30\"x4  Light STM L ITB Manual 15''  Lumbar rot Gr II  30\"x4  STM L ITB Manual 15''  Lumbar rot Gr II  30\"x4  STM L ITB Manual 15''  Lumbar rot Gr II  30\"x4  STM L ITB Manual 15''  Lumbar rot Gr II  30\"x4  STM L ITB Manual 15''  Lumbar rot Gr II  30\"x4  STM L ITB                       HEP: DKTC/SKTC, cross leg stretch, pelvic tilt. Sciatic nerve glides sitting and supine options. Clam  3/16 TrAb bridge. SL march in hooklying. -->SL ext w progress to Sahrmann L1 as fannie. Short walks. Wall push up/counter  DLHR, corner semi/tandem stance. SLR, progress to sidelying glut med.      Charges:  man NR TE       Total Timed Treatment: 45 min  Total Treatment Time: 45 min

## 2023-05-09 ENCOUNTER — OFFICE VISIT (OUTPATIENT)
Dept: PHYSICAL THERAPY | Age: 73
End: 2023-05-09
Attending: NURSE PRACTITIONER
Payer: MEDICARE

## 2023-05-09 PROCEDURE — 97110 THERAPEUTIC EXERCISES: CPT

## 2023-05-09 PROCEDURE — 97140 MANUAL THERAPY 1/> REGIONS: CPT

## 2023-05-09 PROCEDURE — 97112 NEUROMUSCULAR REEDUCATION: CPT

## 2023-05-09 NOTE — PROGRESS NOTES
Diagnosis:   Spinal stenosis of lumbar region with neurogenic claudication (J64.840)           Referring Provider: Nirav Miguel 3/31/23 specialist Glenn Jacome Date of Evaluation:    2/28/23    Precautions: drug allergy, tape adhesive, cancer Next MD visit:   3/31 spine specialist  4/7 hip injection  Date of Surgery: n/a   Insurance Primary/Secondary: MEDICARE / Francis Rodriguez     # Auth Visits: 10  +8        PT order also received from spine specialist.    . Progress Summary  Pt has attended 14 visits in Physical Therapy. 0 cancels 0 no shows. Subjective: Pt reports overall doing much better. Able to do more activities, walk more, clean the house Got back Melchor tape today for the first time since November, eased into it, did about half speed. Pain down the L and/or R legs much better; R ankle can still feel vice . Reports knee arthritis pain with ortho follow up in about a month. Walked 6500 steps last week in the city, exhausted the next day. Able to do it but soreness last week 4-5/10. Pain:  Decreased to 2-3/10. Haven't needed the Tylenol this week. Will take the Tylenol if pain gets to ~ 5/10. Objective:   SLR  R (-)  L (+) for L buttock and L sided LBP. More centralized. No radiating N/T   Gait: quality much improved. (+) Trendelenburg    ROM: segmental hypomobility consistent with diffuse DDD           Relief with flexion based exercises. Extension, L sidebend (+) L LBP    Palpation: Overall much improved. (+) tenderness multiple muscle groups. Significant decreased tenderness L ITB    Strength:  Distal myotomes strong/equal                 Core/gluteal weakness bilat                 Glut med R 3-/5, L 3-/5     Wears external lumbar support as needed for pain relief    Assessment: Excellent follow through with HEP and pt has overall made steady progress. Severe radiating pain with inability to walk at eval. Radiating symptoms have nearly resolved; still with intermittent R ankle pain. Pt also continues to follow up for knee arthritis. Benefit noted from recent hip injection as well. We have gradually been able to progress an appropriate exercise program and pt is beginning to gradually increase her weight bearing tolerance. Functionally pt is able to walk more; caution still given to avoid overdoing it. Benefit noted with new more supportive footwear. Goals:      (to be met in 10 visits)   (I) with HEP  --> met  Demonstrate proper body mechanics with functional squat and without c/o pain. -->partially met; depending on the level of weight bearing activity for the day. Able to tolerate activities in standing/walking for errands 30 mins without aggravation of LE pain --> met  Able to sleep uninterrupted from LE pain. --> progressing  Radicular symptoms abolished with all ADL's.--> improved. R ankle 2-3x/week. Plan: Will keep chart open over the next month with the possibility of completing the final 2 visits on current referral if indicated. Otherwise, expect continued progress with HEP and will plan d/c. Pt also has follow up with ortho next month for possible knee injections.      Date: 3/16/23  Tx#: 6/ 3/23/23  7/10 3/30/23  8/10   4/6/23  9/10 4/11/23  10/10 4/18/23  11/16 4/25/23  12/16 5/2/23  13/16 5/9/23  14/16   NR 25'  TrAb train:  Modified bridge goal 10x2  HEP review/additions  Sciatic nerve glides 10x2  each  DLHR 10x modified  BTB ankle PF 10x  Nustep 5'  Pacing principles/ergo considerations, household tasks NR  Sciatic nerve glides 10x2 each  TrAb train:  Modified bridge  10x2  TrAb march SL 10x1 each  Cont with clam. still hold S/L hip abd  Passive L hip piriformis, ham stretches NR  Sciatic nerve glides 10x2 each  TrAb train:  glute sets -->  Modified bridge  10x2  TrAb march SL 10x1 each  Passive stretches-B cross over piriformis, hams  NR  Sciatic nerve glides 15x  Bridge 15x  TrAb hooklying single leg 20x each --> progress to SL ext  HEP review  Neutral spine sitting/standing considerations, reviewed pacing princples   NR  Pelvic tilt 5x  TrAb brace SL ext 10x2  Sahrmann L 1 progression 5x each    Reassess  Short walks as fannie  Wall push up 10x--> progression to counter at home  STS hip hinge 10x    SB :  DKTC 10x2  bridge LE'S extended NR    TrAb control - progress to Sahrmann L1  Bridge - incr range  Clam 20x - progression to increased LE's ext  Cardio program considerations, ie consider stat bike, Nustep to start. STS eccen control 5x2   NR  TrAb control - Sahrmann L1 10x  Bridge - progress to higher lift. 20x    STS 10x2  Balance in corner - L/R semi tandem--> tandem progressions  HEP upgrades as below  -DLHR 10  -walking program  -graded return to low impact ex class. Lila Redding  Nustep 5'     DLHR 10x2  Shuttle 4 bands DL 10x3    NR  Bridge 10 x-->  Modified bridge/SLR on table 10x each  Side glut med 10x  Rhomberg EC 30\"x3  Foam Rhomberg: -head turns 10x,   -EC trials with SBA   Graded return to cardio/Melchor, safety awareness with balance/modifications as needed. TE  Reassess and review of HEP for d/c  DLHR 20x  Standing gastroc stretch L/R  Lumbar rot cross leg stretch options L/R  Pt ed guidelines return to gym/modified melchor  NR   TrAb control -- Sahrmann progression L1  Bridge incr holds 10x2  sidelying glut med progression 10x   Manual 20'  L LE gentle distraction 30\"x3  Lumbar rot Gr I, II  30\"x4  Light STM L ITB Manual 20'  L LE gentle distraction 30\"x3  Lumbar rot Gr I, II  30\"x4  Light STM L ITB Manual 15''  Lumbar rot Gr I, II  30\"x4  Light STM L ITB Manual 15''  Lumbar rot Gr II  30\"x4  STM L ITB Manual 15''  Lumbar rot Gr II  30\"x4  STM L ITB Manual 15''  Lumbar rot Gr II  30\"x4  STM L ITB Manual 15''  Lumbar rot Gr II  30\"x4  STM L ITB Manual 15''  Lumbar rot Gr II  30\"x4  STM L ITB Manual 15''  Lumbar rot Gr II  30\"x4  STM L ITB                         HEP: DKTC/SKTC, cross leg stretch, pelvic tilt.   Sciatic nerve glides sitting and supine options. Clam  3/16 TrAb bridge. SL march in hooklying. -->SL ext w progress to [facility] L1 as fannie. Short walks. Wall push up/counter  DLHR, corner semi/tandem stance. SLR, progress to sidelying glut med.      Charges:  man NR TEx2       Total Timed Treatment: 55 min  Total Treatment Time: 55 min

## 2023-05-16 ENCOUNTER — APPOINTMENT (OUTPATIENT)
Dept: PHYSICAL THERAPY | Age: 73
End: 2023-05-16
Attending: NURSE PRACTITIONER
Payer: MEDICARE

## 2023-05-23 ENCOUNTER — APPOINTMENT (OUTPATIENT)
Dept: PHYSICAL THERAPY | Age: 73
End: 2023-05-23
Attending: NURSE PRACTITIONER
Payer: MEDICARE

## 2023-05-30 ENCOUNTER — APPOINTMENT (OUTPATIENT)
Dept: PHYSICAL THERAPY | Age: 73
End: 2023-05-30
Attending: NURSE PRACTITIONER
Payer: MEDICARE

## 2023-06-05 ENCOUNTER — LAB ENCOUNTER (OUTPATIENT)
Dept: LAB | Age: 73
End: 2023-06-05
Attending: NURSE PRACTITIONER
Payer: MEDICARE

## 2023-06-05 DIAGNOSIS — R73.03 PREDIABETES: ICD-10-CM

## 2023-06-05 DIAGNOSIS — E78.00 HYPERCHOLESTEROLEMIA: ICD-10-CM

## 2023-06-05 DIAGNOSIS — I10 PRIMARY HYPERTENSION: ICD-10-CM

## 2023-06-05 DIAGNOSIS — E03.9 ACQUIRED HYPOTHYROIDISM: ICD-10-CM

## 2023-06-05 LAB
ALBUMIN SERPL-MCNC: 3.7 G/DL (ref 3.4–5)
ALBUMIN/GLOB SERPL: 1.1 {RATIO} (ref 1–2)
ALP LIVER SERPL-CCNC: 55 U/L
ALT SERPL-CCNC: 41 U/L
ANION GAP SERPL CALC-SCNC: 2 MMOL/L (ref 0–18)
AST SERPL-CCNC: 26 U/L (ref 15–37)
BILIRUB SERPL-MCNC: 0.5 MG/DL (ref 0.1–2)
BUN BLD-MCNC: 20 MG/DL (ref 7–18)
CALCIUM BLD-MCNC: 9.1 MG/DL (ref 8.5–10.1)
CHLORIDE SERPL-SCNC: 107 MMOL/L (ref 98–112)
CHOLEST SERPL-MCNC: 183 MG/DL (ref ?–200)
CO2 SERPL-SCNC: 28 MMOL/L (ref 21–32)
CREAT BLD-MCNC: 1.05 MG/DL
EST. AVERAGE GLUCOSE BLD GHB EST-MCNC: 120 MG/DL (ref 68–126)
FASTING PATIENT LIPID ANSWER: YES
FASTING STATUS PATIENT QL REPORTED: YES
GFR SERPLBLD BASED ON 1.73 SQ M-ARVRAT: 57 ML/MIN/1.73M2 (ref 60–?)
GLOBULIN PLAS-MCNC: 3.5 G/DL (ref 2.8–4.4)
GLUCOSE BLD-MCNC: 91 MG/DL (ref 70–99)
HBA1C MFR BLD: 5.8 % (ref ?–5.7)
HDLC SERPL-MCNC: 62 MG/DL (ref 40–59)
LDLC SERPL CALC-MCNC: 105 MG/DL (ref ?–100)
NONHDLC SERPL-MCNC: 121 MG/DL (ref ?–130)
OSMOLALITY SERPL CALC.SUM OF ELEC: 286 MOSM/KG (ref 275–295)
POTASSIUM SERPL-SCNC: 3.5 MMOL/L (ref 3.5–5.1)
PROT SERPL-MCNC: 7.2 G/DL (ref 6.4–8.2)
SODIUM SERPL-SCNC: 137 MMOL/L (ref 136–145)
TRIGL SERPL-MCNC: 90 MG/DL (ref 30–149)
TSI SER-ACNC: 1.1 MIU/ML (ref 0.36–3.74)
VLDLC SERPL CALC-MCNC: 15 MG/DL (ref 0–30)

## 2023-06-05 PROCEDURE — 80053 COMPREHEN METABOLIC PANEL: CPT

## 2023-06-05 PROCEDURE — 80061 LIPID PANEL: CPT

## 2023-06-05 PROCEDURE — 84443 ASSAY THYROID STIM HORMONE: CPT

## 2023-06-05 PROCEDURE — 36415 COLL VENOUS BLD VENIPUNCTURE: CPT

## 2023-06-05 PROCEDURE — 83036 HEMOGLOBIN GLYCOSYLATED A1C: CPT

## 2023-06-06 ENCOUNTER — OFFICE VISIT (OUTPATIENT)
Dept: INTERNAL MEDICINE CLINIC | Facility: CLINIC | Age: 73
End: 2023-06-06
Payer: MEDICARE

## 2023-06-06 ENCOUNTER — HOSPITAL ENCOUNTER (OUTPATIENT)
Dept: GENERAL RADIOLOGY | Age: 73
Discharge: HOME | End: 2023-06-06
Attending: NURSE PRACTITIONER
Payer: MEDICARE

## 2023-06-06 VITALS
BODY MASS INDEX: 32.47 KG/M2 | SYSTOLIC BLOOD PRESSURE: 122 MMHG | RESPIRATION RATE: 14 BRPM | DIASTOLIC BLOOD PRESSURE: 72 MMHG | TEMPERATURE: 98 F | HEART RATE: 86 BPM | WEIGHT: 181 LBS | OXYGEN SATURATION: 98 % | HEIGHT: 62.5 IN

## 2023-06-06 DIAGNOSIS — I87.2 VENOUS INSUFFICIENCY: ICD-10-CM

## 2023-06-06 DIAGNOSIS — N18.31 STAGE 3A CHRONIC KIDNEY DISEASE (HCC): ICD-10-CM

## 2023-06-06 DIAGNOSIS — G89.29 CHRONIC LEFT HIP PAIN: ICD-10-CM

## 2023-06-06 DIAGNOSIS — E03.9 ACQUIRED HYPOTHYROIDISM: ICD-10-CM

## 2023-06-06 DIAGNOSIS — R73.03 PREDIABETES: ICD-10-CM

## 2023-06-06 DIAGNOSIS — F51.01 PRIMARY INSOMNIA: ICD-10-CM

## 2023-06-06 DIAGNOSIS — M25.552 CHRONIC LEFT HIP PAIN: ICD-10-CM

## 2023-06-06 DIAGNOSIS — M48.062 SPINAL STENOSIS OF LUMBAR REGION WITH NEUROGENIC CLAUDICATION: ICD-10-CM

## 2023-06-06 DIAGNOSIS — E78.00 HYPERCHOLESTEROLEMIA: ICD-10-CM

## 2023-06-06 DIAGNOSIS — I10 PRIMARY HYPERTENSION: Primary | ICD-10-CM

## 2023-06-06 PROBLEM — M25.471 SWELLING OF BOTH ANKLES: Status: RESOLVED | Noted: 2023-04-20 | Resolved: 2023-06-06

## 2023-06-06 PROBLEM — M25.472 SWELLING OF BOTH ANKLES: Status: RESOLVED | Noted: 2023-04-20 | Resolved: 2023-06-06

## 2023-06-06 PROCEDURE — 99214 OFFICE O/P EST MOD 30 MIN: CPT | Performed by: NURSE PRACTITIONER

## 2023-06-06 PROCEDURE — 73503 X-RAY EXAM HIP UNI 4/> VIEWS: CPT | Performed by: NURSE PRACTITIONER

## 2023-06-06 RX ORDER — FLUTICASONE PROPIONATE 50 MCG
1 SPRAY, SUSPENSION (ML) NASAL 2 TIMES DAILY
Qty: 3 EACH | Refills: 1 | Status: SHIPPED | OUTPATIENT
Start: 2023-06-06

## 2023-06-06 RX ORDER — ESZOPICLONE 1 MG/1
1 TABLET, FILM COATED ORAL NIGHTLY PRN
Qty: 90 TABLET | Refills: 0 | Status: SHIPPED | OUTPATIENT
Start: 2023-06-06

## 2023-06-06 RX ORDER — LEVOTHYROXINE SODIUM 0.07 MG/1
75 TABLET ORAL
Qty: 90 TABLET | Refills: 1 | Status: SHIPPED | OUTPATIENT
Start: 2023-06-06

## 2023-06-06 RX ORDER — FUROSEMIDE 40 MG/1
40 TABLET ORAL EVERY OTHER DAY
Qty: 45 TABLET | Refills: 1 | Status: SHIPPED | OUTPATIENT
Start: 2023-06-06

## 2023-06-06 RX ORDER — LISINOPRIL 10 MG/1
10 TABLET ORAL DAILY
Qty: 90 TABLET | Refills: 1 | Status: SHIPPED | OUTPATIENT
Start: 2023-06-06

## 2023-06-06 RX ORDER — POTASSIUM CHLORIDE 20 MEQ/1
20 TABLET, EXTENDED RELEASE ORAL EVERY OTHER DAY
Qty: 45 TABLET | Refills: 1 | Status: SHIPPED | OUTPATIENT
Start: 2023-06-06

## 2023-06-15 ENCOUNTER — TELEPHONE (OUTPATIENT)
Dept: PHYSICAL THERAPY | Facility: HOSPITAL | Age: 73
End: 2023-06-15

## 2023-06-20 ENCOUNTER — OFFICE VISIT (OUTPATIENT)
Dept: PHYSICAL THERAPY | Age: 73
End: 2023-06-20
Attending: NURSE PRACTITIONER
Payer: MEDICARE

## 2023-06-20 DIAGNOSIS — M48.062 SPINAL STENOSIS OF LUMBAR REGION WITH NEUROGENIC CLAUDICATION: ICD-10-CM

## 2023-06-20 DIAGNOSIS — G89.29 CHRONIC LEFT HIP PAIN: ICD-10-CM

## 2023-06-20 DIAGNOSIS — M25.552 CHRONIC LEFT HIP PAIN: ICD-10-CM

## 2023-06-20 PROCEDURE — 97162 PT EVAL MOD COMPLEX 30 MIN: CPT

## 2023-06-20 PROCEDURE — 97110 THERAPEUTIC EXERCISES: CPT

## 2023-06-22 ENCOUNTER — OFFICE VISIT (OUTPATIENT)
Dept: PHYSICAL THERAPY | Age: 73
End: 2023-06-22
Attending: NURSE PRACTITIONER
Payer: MEDICARE

## 2023-06-22 PROCEDURE — 97110 THERAPEUTIC EXERCISES: CPT

## 2023-06-22 PROCEDURE — 97140 MANUAL THERAPY 1/> REGIONS: CPT

## 2023-06-22 NOTE — PROGRESS NOTES
Diagnosis:   Associated DX:  Chronic left hip pain (M25.552,G89.29)  Spinal stenosis of lumbar region with neurogenic claudication (N78.534)           Referring Provider: Roseanne Hou  Date of Evaluation:    6/20/23    Precautions:     Drug Allergy, Cancer, adhesive allergy     Next MD visit:   none scheduled  Date of Surgery: n/a   Insurance Primary/Secondary: MEDICARE / [employer]     # Auth Visits: 6 POC            Subjective:     Getting ready for daughter's visit with lots of activity, getting the house ready, notes the painful soreness. Pain:   4/10, at best 2/10, at worst 6/10. Objective:   (+) significant tenderness bilat prox ITB into gluteals, piriformi and LS musculature. SLR: R (-)  L (-)       Assessment: Pt very motivated with excellent follow through with HEP. Tendancy to overdo household chores etc which increases pain; discussed possible options for prioritization. Discussed pain science education, effect on NS, benefit of exercise program.       Goals:   (to be met in 6 visits)   (I) with HEP  Demonstrate proper body mechanics with functional squat, able to reach objects on floor without c/o pain. Improved lumbar flexion to enable don/doff socks and shoes without complaints. Able to tolerate activities in standing for dishes at sink, 20 mins without aggravation of pain  Able to sleep uninterrupted from pain. Plan: Frequency / Duration: Patient will be seen for 1-2 x/week or a total of 6 visits over a 90 day period. Treatment will include: modify HEP as indicated Gait training, Manual Therapy, Neuromuscular Re-education, Therapeutic Activities, Therapeutic Exercise, Home Exercise Program instruction and IFC/heat/ice prn. Date: 6/22/2023  TX#: 2/6 Date:                 TX#: 3/ Date:                 TX#: 4/ Date:                 TX#: 5/ Date:    Tx#: 6/   TE  Nustep 5'         gastroc stretch 5x  Pelvic tilt 10x  Sciatic nerve glides ~60\" each       LTR  10x  Pt ed pain science, NS sensitization, breathing, pacing principles, stress response       Manual  STM L ITB/gluteals  L lumbar rot mobe Gr I, II 30\" x4  L/R LE distraction 10\"x3 each           HEP:  SKTC, DKTC, cross leg lumbar rot stretch L/R.  sciatic nerve glides. Hold clam and bridge for now.        Charges: TEx2 man x 1       Total Timed Treatment: 45 min  Total Treatment Time: 45 min

## 2023-06-30 ENCOUNTER — OFFICE VISIT (OUTPATIENT)
Dept: ORTHOPEDICS CLINIC | Facility: CLINIC | Age: 73
End: 2023-06-30
Payer: MEDICARE

## 2023-06-30 VITALS — BODY MASS INDEX: 33.13 KG/M2 | HEIGHT: 62 IN | WEIGHT: 180 LBS

## 2023-06-30 DIAGNOSIS — M17.0 PRIMARY OSTEOARTHRITIS OF BOTH KNEES: Primary | ICD-10-CM

## 2023-06-30 PROCEDURE — 20610 DRAIN/INJ JOINT/BURSA W/O US: CPT | Performed by: ORTHOPAEDIC SURGERY

## 2023-06-30 PROCEDURE — 1125F AMNT PAIN NOTED PAIN PRSNT: CPT | Performed by: ORTHOPAEDIC SURGERY

## 2023-06-30 RX ORDER — TRIAMCINOLONE ACETONIDE 40 MG/ML
40 INJECTION, SUSPENSION INTRA-ARTICULAR; INTRAMUSCULAR ONCE
Status: COMPLETED | OUTPATIENT
Start: 2023-06-30 | End: 2023-06-30

## 2023-06-30 RX ADMIN — TRIAMCINOLONE ACETONIDE 40 MG: 40 INJECTION, SUSPENSION INTRA-ARTICULAR; INTRAMUSCULAR at 12:18:00

## 2023-07-03 ENCOUNTER — TELEPHONE (OUTPATIENT)
Facility: LOCATION | Age: 73
End: 2023-07-03

## 2023-07-03 DIAGNOSIS — Z17.0 MALIGNANT NEOPLASM OF CENTRAL PORTION OF RIGHT BREAST IN FEMALE, ESTROGEN RECEPTOR POSITIVE (HCC): Primary | ICD-10-CM

## 2023-07-03 DIAGNOSIS — C50.111 MALIGNANT NEOPLASM OF CENTRAL PORTION OF RIGHT BREAST IN FEMALE, ESTROGEN RECEPTOR POSITIVE (HCC): Primary | ICD-10-CM

## 2023-07-05 ENCOUNTER — OFFICE VISIT (OUTPATIENT)
Facility: LOCATION | Age: 73
End: 2023-07-05
Payer: MEDICARE

## 2023-07-05 ENCOUNTER — HOSPITAL ENCOUNTER (OUTPATIENT)
Dept: MAMMOGRAPHY | Facility: HOSPITAL | Age: 73
Discharge: HOME | End: 2023-07-05
Attending: SURGERY
Payer: MEDICARE

## 2023-07-05 VITALS — TEMPERATURE: 97 F | HEART RATE: 95 BPM

## 2023-07-05 DIAGNOSIS — C50.111 MALIGNANT NEOPLASM OF CENTRAL PORTION OF RIGHT BREAST IN FEMALE, ESTROGEN RECEPTOR POSITIVE: ICD-10-CM

## 2023-07-05 DIAGNOSIS — Z17.0 MALIGNANT NEOPLASM OF CENTRAL PORTION OF RIGHT BREAST IN FEMALE, ESTROGEN RECEPTOR POSITIVE: ICD-10-CM

## 2023-07-05 DIAGNOSIS — R92.8 ABNORMAL ULTRASOUND OF BREAST: Primary | ICD-10-CM

## 2023-07-05 PROCEDURE — 99213 OFFICE O/P EST LOW 20 MIN: CPT | Performed by: SURGERY

## 2023-07-05 PROCEDURE — 76641 ULTRASOUND BREAST COMPLETE: CPT | Performed by: SURGERY

## 2023-07-06 ENCOUNTER — TELEPHONE (OUTPATIENT)
Dept: PHYSICAL THERAPY | Age: 73
End: 2023-07-06

## 2023-07-07 NOTE — TELEPHONE ENCOUNTER
Email received from pt for possible PT visit today. Fully booked today but Called pt to follow up and left message, also responded to email to check in and offered later, 7 pm appt today if pt needed to get in.

## 2023-07-10 ENCOUNTER — OFFICE VISIT (OUTPATIENT)
Dept: ORTHOPEDICS CLINIC | Facility: CLINIC | Age: 73
End: 2023-07-10
Payer: MEDICARE

## 2023-07-10 VITALS — BODY MASS INDEX: 32.94 KG/M2 | WEIGHT: 179 LBS | OXYGEN SATURATION: 97 % | HEART RATE: 100 BPM | HEIGHT: 62 IN

## 2023-07-10 DIAGNOSIS — M16.11 PRIMARY OSTEOARTHRITIS OF RIGHT HIP: Primary | ICD-10-CM

## 2023-07-10 RX ORDER — TRIAMCINOLONE ACETONIDE 40 MG/ML
40 INJECTION, SUSPENSION INTRA-ARTICULAR; INTRAMUSCULAR ONCE
Status: COMPLETED | OUTPATIENT
Start: 2023-07-10 | End: 2023-07-10

## 2023-07-10 RX ADMIN — TRIAMCINOLONE ACETONIDE 40 MG: 40 INJECTION, SUSPENSION INTRA-ARTICULAR; INTRAMUSCULAR at 16:52:00

## 2023-07-10 NOTE — PROCEDURES
After informed consent, the patient's right hip was marked, prepped with topical antiseptic, and locally anesthetized with skin refrigerant followed by injection of 1% lidocaine to create a skin wheal anteriorly at an insertion site a few fingerbreadths lateral to the femoral pulse, along the trajectory of the femoral neck. Next, the area was re-prepped with topical antiseptic, and ultrasound guidance was performed to direct a 20 gauge spinal needle into the hip joint at the junction of the femoral head and neck, after which a mixture of 1mL 40mg/mL Kenalog, 2mL 1% lidocaine and 2mL 0.5% marcaine was injected while visualizing the fluid under ultrasound. Confirmatory imaging was saved to the patient's chart. A band-aid was applied. The patient tolerated the procedure well.     Terral Frankel, MD, [address] [facility]  Phone [phone]  Fax [phone]

## 2023-09-04 NOTE — PROGRESS NOTES
Diagnosis:   Associated DX:  Chronic left hip pain (M25.552,G89.29)  Spinal stenosis of lumbar region with neurogenic claudication (C09.052)           Referring Provider: Tammy Egn  Date of Evaluation:    6/20/23    Precautions:     Drug Allergy, Cancer, adhesive allergy     Next MD visit:   none scheduled  Date of Surgery: n/a   Insurance Primary/Secondary: Aracely Stoll / Stephie Wing     # Auth Visits: 7 POC   Cert thru 3/84/73        Progress Summary  Pt has attended 3 visits in Physical Therapy. Pt has been out of country; requesting date extension on plan of care. Subjective:     Reports pain is not in the calf anymore. Sciatica pain \"easing off\". Still issues if I stand too long, 30-60 mins max. L or R ankle vice . L/R LBP depends on the day. Would like to get back to the gym but doesn't feel ready yet, pain concerns. Pain:   4/10, at best 2/10, at worst 6/10. Objective:   R knee 1st injection R hip 4th. ROM: %. Ext (+) R LE pain thigh. R SB (+) R LE pain thigh    (+) significant tenderness bilat prox ITB into gluteals, piriformi and LS musculature. SLR: R (-)  L (-)     Assessment: Pt returns from out of town noting overall progress with less pain since aggravation of symptoms back in Jan.  Pain more centralized R and L LE's. Pt is also managing pain with injections; benefit reported. Modification of hip abd strengthening which remains challenging for pt; able to fannie small range low reps. Pt to benefit from remaining visits of PT to progress strengthening regimen and gradual safe return to gym workouts. She should then be ready for d/c to HEP    Goals: In Progress  (to be met in 6 visits)   (I) with HEP  Demonstrate proper body mechanics with functional squat, able to reach objects on floor without c/o pain. Improved lumbar flexion to enable don/doff socks and shoes without complaints.    Able to tolerate activities in standing for dishes at sink, 20 mins without aggravation of pain  Able to sleep uninterrupted from pain. Plan:  Cont PT 4 remaining visits to be spread over the next 2 months to progress strengthening regimen. Pt should then be ready for d/c to (I) HEP. Patient/Family/Caregiver was advised of these findings, precautions, and treatment options and has agreed to actively participate in planning and for this course of care. Thank you for your referral. If you have any questions, please contact me at Dept: [phone]. Sincerely,  Electronically signed by therapist: Endy Rees, PT     Physician's certification required:  Yes  Please co-sign or sign and return this letter via fax as soon as possible to [phone]. I certify the need for these services furnished under this plan of treatment and while under my care. X___________________________________________________ Date____________________    Certification From: 0/5/0786  To:12/5/2023       Date: 6/22/2023  TX#: 2/6 Date:    9/6/23             TX#: 3/7 Date:                 TX#: 4/ Date:                 TX#: 5/ Date: Tx#: 6/   TE  Nustep 5'   TE  reassess      gastroc stretch 5x  Pelvic tilt 10x  Sciatic nerve glides ~60\" each HEP review answering pt questions  S/L glut med partial range, progress from clam- work up to 20x      LTR  10x  Pt ed pain science, NS sensitization, breathing, pacing principles, stress response Bridge 10x  TrAb review with dead bug      Manual  STM L ITB/gluteals  L lumbar rot mobe Gr I, II 30\" x4  L/R LE distraction 10\"x3 each     Manual  STM L/R ITB/gluteals, roller  R lumbar rot mobe Gr I, II 30\" x4      HEP:  SKTC, DKTC, cross leg lumbar rot stretch L/R.  sciatic nerve glides. 9/6/23 Clam --> side glut med.   Bridge progression    Charges: TEx2 29' man x 1  15'     Total Timed Treatment: 43 min  Total Treatment Time: 43 min

## 2023-09-05 ENCOUNTER — OFFICE VISIT (OUTPATIENT)
Dept: PHYSICAL THERAPY | Age: 73
End: 2023-09-05
Attending: NURSE PRACTITIONER
Payer: MEDICARE

## 2023-09-05 PROCEDURE — 97110 THERAPEUTIC EXERCISES: CPT

## 2023-09-05 PROCEDURE — 97140 MANUAL THERAPY 1/> REGIONS: CPT

## 2023-09-07 ENCOUNTER — APPOINTMENT (OUTPATIENT)
Dept: PHYSICAL THERAPY | Age: 73
End: 2023-09-07
Attending: NURSE PRACTITIONER
Payer: MEDICARE

## 2023-09-11 ENCOUNTER — TELEPHONE (OUTPATIENT)
Dept: PHYSICAL THERAPY | Facility: HOSPITAL | Age: 73
End: 2023-09-11

## 2023-09-11 ENCOUNTER — TELEPHONE (OUTPATIENT)
Dept: INTERNAL MEDICINE CLINIC | Facility: CLINIC | Age: 73
End: 2023-09-11

## 2023-09-11 NOTE — TELEPHONE ENCOUNTER
Per patient request, faxed test results dated 6/5/23 to Dr Shah Leaver office at [phone].  Confirmation received

## 2023-09-12 ENCOUNTER — APPOINTMENT (OUTPATIENT)
Dept: PHYSICAL THERAPY | Age: 73
End: 2023-09-12
Attending: NURSE PRACTITIONER
Payer: MEDICARE

## 2023-09-12 ENCOUNTER — OFFICE VISIT (OUTPATIENT)
Dept: PHYSICAL THERAPY | Age: 73
End: 2023-09-12
Attending: NURSE PRACTITIONER
Payer: MEDICARE

## 2023-09-12 PROCEDURE — 97140 MANUAL THERAPY 1/> REGIONS: CPT

## 2023-09-12 PROCEDURE — 97112 NEUROMUSCULAR REEDUCATION: CPT

## 2023-09-14 ENCOUNTER — APPOINTMENT (OUTPATIENT)
Dept: PHYSICAL THERAPY | Age: 73
End: 2023-09-14
Payer: MEDICARE

## 2023-09-21 ENCOUNTER — APPOINTMENT (OUTPATIENT)
Dept: PHYSICAL THERAPY | Age: 73
End: 2023-09-21
Payer: MEDICARE

## 2023-09-26 ENCOUNTER — OFFICE VISIT (OUTPATIENT)
Dept: PHYSICAL THERAPY | Age: 73
End: 2023-09-26
Attending: NURSE PRACTITIONER
Payer: MEDICARE

## 2023-09-26 PROCEDURE — 97112 NEUROMUSCULAR REEDUCATION: CPT

## 2023-09-26 PROCEDURE — 97110 THERAPEUTIC EXERCISES: CPT

## 2023-09-26 NOTE — PROGRESS NOTES
Diagnosis:   Associated DX:  Chronic left hip pain (M25.552,G89.29)  Spinal stenosis of lumbar region with neurogenic claudication (R81.987)           Referring Provider: Olman Norton  Date of Evaluation:    6/20/23    Precautions:     Drug Allergy, Cancer, adhesive allergy     Next MD visit:   none scheduled  Date of Surgery: n/a    Hip oct 11  Knee injection this friday   Insurance Primary/Secondary: Dallin Dow / Gabriela Buschy     # Auth Visits: 7 POC   Cert thru 5/56/78         Subjective:   Pleased sciatica pain has subsided. Hasn't had the pain in the ankle for 2 weeks but notes pain on top of thigh. Had attempted back to exercise class but had flared up pain terribly; plans still in future when able. For now is beginning to walk again, short distances with the neighbor. Struggles has a terrible time getting off the floor. Pain: across LB L/R R hip/thigh  4/10, at best 2/10, at worst 6/10. Objective:   Glut med 3/5 bilat  Hip injection scheduled Oct 11  Gradual decreased tenderness bilat ITB into gluteals - improved since last month  R knee 1st injection R hip 4th. ROM: %. Ext (+) R LE pain thigh. R SB (+) R LE pain thigh    SLR: R (-)  L (-)     Assessment: Functionally pt remains limited with any prolonged standing/walking tasks and increased activity around the house for chores. Pain complaints various areas in the back hips/knees. Pt has benefited from a comprehensive exercise program instruction, modification of strengthening exercises and guidance with progressions, as well as pain science principles ie role of NS, NS requirements, benefit of exercise, sensitiizing chemical and pacing principles. Addition of light walking program.  Excellent follow through with HEP. Goals: In Progress  (to be met in 6 visits)   (I) with HEP  Demonstrate proper body mechanics with functional squat, able to reach objects on floor without c/o pain.   Improved lumbar flexion to enable don/doff socks and shoes without complaints. Able to tolerate activities in standing for dishes at sink, 20 mins without aggravation of pain  Able to sleep uninterrupted from pain. Plan:  2 visits remaining to finalize HEP and then plan d/c          Date: 6/22/2023  TX#: 2/6 Date:    9/6/23             TX#: 3/7 Date:  9/12/23               TX#: 4/7 Date:  9/26/23               TX#: 5/ Date: Tx#: 6/7   TE  Nustep 5'   TE  reassess NR TE  Nustep 5'    gastroc stretch 5x  Pelvic tilt 10x  Sciatic nerve glides ~60\" each HEP review answering pt questions  S/L glut med partial range, progress from clam- work up to 20x STS 10x hip hinge  Sciatic nerve glides 10x2 each  Sidelying hip abd review  Pt ed back care, pain science, NS sensitization, NS requirements, ex benefits, yellow flags DLHR 10x2  HEP review, answering pt questions - glut med sidelying 20x  Cross leg lumbar stretch 3x each  NR  TNE as above  STS - hip hinge 10x2  Floor transfer train  -1/2 knee with support  -modified split squat 5x  Pelvic tilt 10x  TrAb control with bridge 15x    LTR  10x  Pt ed pain science, NS sensitization, breathing, pacing principles, stress response Bridge 10x  TrAb review with dead bug Manual  STM ITB into gluteals  Lumbar rot mobe Gr II 30\"x3     Manual  STM L ITB/gluteals  L lumbar rot mobe Gr I, II 30\" x4  L/R LE distraction 10\"x3 each     Manual  STM L/R ITB/gluteals, roller  R lumbar rot mobe Gr I, II 30\" x4      HEP:  SKTC, DKTC, cross leg lumbar rot stretch L/R.  sciatic nerve glides. 9/6/23 Clam --> side glut med.   Bridge progression  9/27 STS 20x    Charges: NRx2 28' TE x 1  15'     Total Timed Treatment: 43 min  Total Treatment Time: 43 min

## 2023-09-29 ENCOUNTER — OFFICE VISIT (OUTPATIENT)
Dept: ORTHOPEDICS CLINIC | Facility: CLINIC | Age: 73
End: 2023-09-29
Payer: MEDICARE

## 2023-09-29 VITALS — HEIGHT: 62 IN | WEIGHT: 179 LBS | BODY MASS INDEX: 32.94 KG/M2

## 2023-09-29 DIAGNOSIS — M17.0 PRIMARY OSTEOARTHRITIS OF BOTH KNEES: Primary | ICD-10-CM

## 2023-09-29 RX ORDER — TRIAMCINOLONE ACETONIDE 40 MG/ML
40 INJECTION, SUSPENSION INTRA-ARTICULAR; INTRAMUSCULAR ONCE
Status: COMPLETED | OUTPATIENT
Start: 2023-09-29 | End: 2023-09-29

## 2023-09-29 RX ADMIN — TRIAMCINOLONE ACETONIDE 40 MG: 40 INJECTION, SUSPENSION INTRA-ARTICULAR; INTRAMUSCULAR at 11:38:00

## 2023-09-29 NOTE — PROCEDURES
Risks and benefits of knee injection discussed with the patient, with risks including but not limited to pain and swelling at the injection site and/or within the knee joint, infection, elevation in blood pressure and/or glucose levels, facial flushing. After informed consent, the patient's right knee was marked, locally anesthetized with skin refrigerant, prepped with topical antiseptic, and injected with a mixture of 1mL 40mg/mL Kenalog, 2mL 1% lidocaine and 2mL 0.5% marcaine through the inferolateral portal.  A band-aid was applied. The patient tolerated the procedure well.     Tony Jordan MD, [address] [facility]  Phone [phone]  Fax [phone]

## 2023-10-03 ENCOUNTER — OFFICE VISIT (OUTPATIENT)
Dept: PHYSICAL THERAPY | Age: 73
End: 2023-10-03
Attending: NURSE PRACTITIONER
Payer: MEDICARE

## 2023-10-03 PROCEDURE — 97110 THERAPEUTIC EXERCISES: CPT

## 2023-10-03 PROCEDURE — 97140 MANUAL THERAPY 1/> REGIONS: CPT

## 2023-10-03 NOTE — PROGRESS NOTES
Diagnosis:   Associated DX:  Chronic left hip pain (M25.552,G89.29)  Spinal stenosis of lumbar region with neurogenic claudication (E63.680)           Referring Provider: Denisha Johansen  Date of Evaluation:    6/20/23    Precautions:     Drug Allergy, Cancer, adhesive allergy     Next MD visit:   none scheduled  Date of Surgery: n/a    Hip oct 11  Knee injection this friday   Insurance Primary/Secondary: Paul Bae / Mateusz      # Auth Visits: 7 POC   Cert thru 6/98/69         Subjective:   Knee injection was Friday. Hip injection planned. Both knees and hips painful. Would like to get back to more activity, workouts/adelaide and line dancing. Sciatica pain getting better but not gone. Pain: across LB L/R R hip/thigh  4/10, at best 2/10, at worst 6/10. Objective:   Glut med 3/5 bilat  Hip injection scheduled Oct 11  Gradual decreased tenderness bilat ITB into gluteals - improved since last month  R knee 1st injection R hip 4th. ROM: %. Ext (+) R LE pain thigh. R SB (+) R LE pain thigh    SLR: R (-)  L (-)     Assessment: Discussed long term goals of activity, incorporating paced return to classes, consider Yoga, breathing/meditation principles. Functionally pt remains limited with any prolonged standing/walking tasks and increased activity around the house for chores. Pain complaints various areas in the back hips/knees. Pt has benefited from a comprehensive exercise program instruction, modification of strengthening exercises and guidance with progressions, as well as pain science principles ie role of NS, NS requirements, benefit of exercise, sensitiizing chemical and pacing principles. Goals: In Progress  (to be met in 6 visits)   (I) with HEP  Demonstrate proper body mechanics with functional squat, able to reach objects on floor without c/o pain. Improved lumbar flexion to enable don/doff socks and shoes without complaints.    Able to tolerate activities in standing for dishes at sink, 20 mins without aggravation of pain  Able to sleep uninterrupted from pain. Plan:  Reassess next visit for d/c          Date: 6/22/2023  TX#: 2/6 Date:    9/6/23             TX#: 3/7 Date:  9/12/23               TX#: 4/7 Date:  9/26/23               TX#: 5/ Date: 10/3/23  Tx#: 6/7   TE  Nustep 5'   TE  reassess NR TE  Nustep 5' TE  Nustep 5'   gastroc stretch 5x  Pelvic tilt 10x  Sciatic nerve glides ~60\" each HEP review answering pt questions  S/L glut med partial range, progress from clam- work up to 20x STS 10x hip hinge  Sciatic nerve glides 10x2 each  Sidelying hip abd review  Pt ed back care, pain science, NS sensitization, NS requirements, ex benefits, yellow flags DLHR 10x2  HEP review, answering pt questions - glut med sidelying 20x  Cross leg lumbar stretch 3x each  NR  TNE as above  STS - hip hinge 10x2  Floor transfer train  -1/2 knee with support  -modified split squat 5x  Pelvic tilt 10x  TrAb control with bridge 15x DLHR 10x2  Standing hip abd [id number] each  STS review  HEP: cont with breathing/meditation, consider Yoga. Cardio component, desensitization principles  Manual   STM ITB into gluteals/ desensitization principles  Lumbar rot mobe L/R Gr II 30\"x3 each       LTR  10x  Pt ed pain science, NS sensitization, breathing, pacing principles, stress response Bridge 10x  TrAb review with dead bug Manual  STM ITB into gluteals  Lumbar rot mobe Gr II 30\"x3     Manual  STM L ITB/gluteals  L lumbar rot mobe Gr I, II 30\" x4  L/R LE distraction 10\"x3 each     Manual  STM L/R ITB/gluteals, roller  R lumbar rot mobe Gr I, II 30\" x4      HEP:  SKTC, DKTC, cross leg lumbar rot stretch L/R.  sciatic nerve glides. 9/6/23 Clam --> side glut med.   Bridge progression  9/27 STS 20x    Charges: TEx2 29' man x 1  15'     Total Timed Treatment: 43 min  Total Treatment Time: 43 min

## 2023-10-10 ENCOUNTER — APPOINTMENT (OUTPATIENT)
Dept: PHYSICAL THERAPY | Age: 73
End: 2023-10-10
Attending: NURSE PRACTITIONER
Payer: MEDICARE

## 2023-10-16 ENCOUNTER — HOSPITAL ENCOUNTER (OUTPATIENT)
Dept: ULTRASOUND IMAGING | Age: 73
Discharge: HOME | End: 2023-10-16
Attending: SURGERY
Payer: MEDICARE

## 2023-10-16 ENCOUNTER — TELEPHONE (OUTPATIENT)
Facility: LOCATION | Age: 73
End: 2023-10-16

## 2023-10-16 DIAGNOSIS — R92.8 ABNORMAL ULTRASOUND OF BREAST: ICD-10-CM

## 2023-10-16 DIAGNOSIS — N63.21 MASS OF UPPER OUTER QUADRANT OF LEFT BREAST: Primary | ICD-10-CM

## 2023-10-16 DIAGNOSIS — C50.111 MALIGNANT NEOPLASM OF CENTRAL PORTION OF RIGHT BREAST IN FEMALE, ESTROGEN RECEPTOR POSITIVE: ICD-10-CM

## 2023-10-16 DIAGNOSIS — Z17.0 MALIGNANT NEOPLASM OF CENTRAL PORTION OF RIGHT BREAST IN FEMALE, ESTROGEN RECEPTOR POSITIVE: ICD-10-CM

## 2023-10-16 PROCEDURE — 76642 ULTRASOUND BREAST LIMITED: CPT | Performed by: SURGERY

## 2023-10-16 NOTE — TELEPHONE ENCOUNTER
----- Message from Jinger Libman, MD sent at 10/16/2023  1:05 PM CDT -----  Please put in the recall system for a breast ultrasound to be done in 3 months. Thanks!

## 2023-10-17 ENCOUNTER — OFFICE VISIT (OUTPATIENT)
Dept: PHYSICAL THERAPY | Age: 73
End: 2023-10-17
Attending: NURSE PRACTITIONER
Payer: MEDICARE

## 2023-10-17 PROCEDURE — 97112 NEUROMUSCULAR REEDUCATION: CPT

## 2023-10-17 NOTE — PROGRESS NOTES
Diagnosis:   Associated DX:  Chronic left hip pain (M25.552,G89.29)  Spinal stenosis of lumbar region with neurogenic claudication (V02.151)           Referring Provider: Daryle Schwalbe  Date of Evaluation:    6/20/23    Precautions:     Drug Allergy, Cancer, adhesive allergy     Next MD visit:   none scheduled  Date of Surgery: n/a    Hip Nov 15  Knee injection this friday   Insurance Primary/Secondary: Lilian Bryant / Sherri Sommers     # Auth Visits: 7 POC   Cert thru 6/97/14  + 3 months recert      Progress/Discharge Summary  Pt has attended 7 visits in Physical Therapy. Subjective:   Overall improvement and pt feels ready to continue with exercises on her own now. She will ease back into [place]/gym. Good and bad days but she has been working on the strengthening exercises, core and hip strengthening. Will put off hip injection a month to plan it better around travel. Pleased she hasn't needed to use Tylenol for a couple weeks now. Leg cramps have also been better for the past month. Pain: across LB L/R R hip/thigh  4/10, at best 2/10, at worst: decreased to 5/10. Objective:   SLR R (-)   L (-)      (R (+) at eval)  Strength:  Glut med R 3+/5,  L 3/5  Glut max R 4/5  L 3+/5  Gradual decreased tenderness bilat ITB into gluteals - improved since last month  ROM: %. Ext (+) R LE pain thigh. R SB (+) R LE pain thigh      Assessment: Good overall, gradual progress in PT. Sciatica pain nearly resolved; pain more centralized across LB now. Pt has incorporated pacing principles into her day to day tasks. Discussed long term goals of activity, incorporating paced return to classes, consider Yoga, breathing/meditation principles. Functionally pt remains limited with any prolonged standing/walking tasks and increased activity around the house for chores. She has excellent follow through with her HEP and is now ready to cont with the exercises independently. Goals: Progress made toward all goals.    (to be met in 6 visits)   (I) with HEP -> met  Demonstrate proper body mechanics with functional squat, able to reach objects on floor without c/o pain. Improved lumbar flexion to enable don/doff socks and shoes without complaints. Able to tolerate activities in standing for dishes at sink, 20 mins without aggravation of pain  Able to sleep uninterrupted from pain. Plan:  D/C to HEP. Hip injection next month. Date: 6/22/2023  TX#: 2/6 Date:    9/6/23             TX#: 3/7 Date:  9/12/23               TX#: 4/7 Date:  9/26/23               TX#: 5/ Date: 10/3/23  Tx#: 6/7 10/17/23  7/7   TE  Nustep 5'   TE  reassess NR TE  Nustep 5' TE  Nustep 5' NR  Nustep 6'   gastroc stretch 5x  Pelvic tilt 10x  Sciatic nerve glides ~60\" each HEP review answering pt questions  S/L glut med partial range, progress from clam- work up to 20x STS 10x hip hinge  Sciatic nerve glides 10x2 each  Sidelying hip abd review  Pt ed back care, pain science, NS sensitization, NS requirements, ex benefits, yellow flags DLHR 10x2  HEP review, answering pt questions - glut med sidelying 20x  Cross leg lumbar stretch 3x each  NR  TNE as above  STS - hip hinge 10x2  Floor transfer train  -1/2 knee with support  -modified split squat 5x  Pelvic tilt 10x  TrAb control with bridge 15x DLHR 10x2  Standing hip abd [id number] each  STS review  HEP: cont with breathing/meditation, consider Yoga. Cardio component, desensitization principles  Manual   STM ITB into gluteals/ desensitization principles  Lumbar rot mobe L/R Gr II 30\"x3 each     Reassess and review of HEP for d/c, pt ed review with NS requirements, sensitization, benefits of appropriate execise, pacing principles.   Sciatic nerve glides L/R 20x  Bridge 10x--> modified: bridge with SLR progression 10x  Easier floor transfer noted  STS without UE assist/hip hinge  TrAb control hooklying Sahrmann L 1   LTR  10x  Pt ed pain science, NS sensitization, breathing, pacing principles, stress response Bridge 10x  TrAb review with dead bug Manual  STM ITB into gluteals  Lumbar rot mobe Gr II 30\"x3      Manual  STM L ITB/gluteals  L lumbar rot mobe Gr I, II 30\" x4  L/R LE distraction 10\"x3 each     Manual  STM L/R ITB/gluteals, roller  R lumbar rot mobe Gr I, II 30\" x4       HEP:  SKTC, DKTC, cross leg lumbar rot stretch L/R.  sciatic nerve glides. 9/6/23 Clam --> side glut med. Bridge progression  9/27 STS 20x  10/17 modified bridge with SLR.      Charges: NRx3   Total Timed Treatment: 43 min  Total Treatment Time: 43 min

## 2023-10-18 ENCOUNTER — LAB ENCOUNTER (OUTPATIENT)
Dept: LAB | Age: 73
End: 2023-10-18
Attending: COUNSELOR
Payer: MEDICARE

## 2023-10-18 DIAGNOSIS — E55.9 VITAMIN D DEFICIENCY: ICD-10-CM

## 2023-10-18 DIAGNOSIS — I10 HTN (HYPERTENSION): ICD-10-CM

## 2023-10-18 DIAGNOSIS — E78.5 HYPERLIPIDEMIA, UNSPECIFIED HYPERLIPIDEMIA TYPE: ICD-10-CM

## 2023-10-18 DIAGNOSIS — E03.9 ACQUIRED HYPOTHYROIDISM: Primary | ICD-10-CM

## 2023-10-18 DIAGNOSIS — N18.31 STAGE 3A CHRONIC KIDNEY DISEASE (HCC): ICD-10-CM

## 2023-10-18 DIAGNOSIS — R53.83 FATIGUE: ICD-10-CM

## 2023-10-18 LAB
BASOPHILS # BLD AUTO: 0.03 X10(3) UL (ref 0–0.2)
BASOPHILS NFR BLD AUTO: 0.5 %
EOSINOPHIL # BLD AUTO: 0.12 X10(3) UL (ref 0–0.7)
EOSINOPHIL NFR BLD AUTO: 1.9 %
ERYTHROCYTE [DISTWIDTH] IN BLOOD BY AUTOMATED COUNT: 14.3 %
HCT VFR BLD AUTO: 42 %
HGB BLD-MCNC: 13.8 G/DL
IMM GRANULOCYTES # BLD AUTO: 0.02 X10(3) UL (ref 0–1)
IMM GRANULOCYTES NFR BLD: 0.3 %
LYMPHOCYTES # BLD AUTO: 1.6 X10(3) UL (ref 1–4)
LYMPHOCYTES NFR BLD AUTO: 24.8 %
MCH RBC QN AUTO: 30.4 PG (ref 26–34)
MCHC RBC AUTO-ENTMCNC: 32.9 G/DL (ref 31–37)
MCV RBC AUTO: 92.5 FL
MONOCYTES # BLD AUTO: 0.6 X10(3) UL (ref 0.1–1)
MONOCYTES NFR BLD AUTO: 9.3 %
NEUTROPHILS # BLD AUTO: 4.08 X10 (3) UL (ref 1.5–7.7)
NEUTROPHILS # BLD AUTO: 4.08 X10(3) UL (ref 1.5–7.7)
NEUTROPHILS NFR BLD AUTO: 63.2 %
PLATELET # BLD AUTO: 248 10(3)UL (ref 150–450)
RBC # BLD AUTO: 4.54 X10(6)UL
TSI SER-ACNC: 0.6 MIU/ML (ref 0.36–3.74)
VIT D+METAB SERPL-MCNC: 48.6 NG/ML (ref 30–100)
WBC # BLD AUTO: 6.5 X10(3) UL (ref 4–11)

## 2023-10-18 PROCEDURE — 85025 COMPLETE CBC W/AUTO DIFF WBC: CPT

## 2023-10-18 PROCEDURE — 82306 VITAMIN D 25 HYDROXY: CPT

## 2023-10-18 PROCEDURE — 84443 ASSAY THYROID STIM HORMONE: CPT

## 2023-10-18 PROCEDURE — 36415 COLL VENOUS BLD VENIPUNCTURE: CPT

## 2023-10-20 ENCOUNTER — HOSPITAL ENCOUNTER (OUTPATIENT)
Dept: CV DIAGNOSTICS | Age: 73
Discharge: HOME | End: 2023-10-20
Attending: INTERNAL MEDICINE
Payer: MEDICARE

## 2023-10-20 DIAGNOSIS — Z79.899 ENCOUNTER FOR MONITORING CARDIOTOXIC DRUG THERAPY: ICD-10-CM

## 2023-10-20 DIAGNOSIS — Z17.0 MALIGNANT NEOPLASM OF CENTRAL PORTION OF RIGHT BREAST IN FEMALE, ESTROGEN RECEPTOR POSITIVE: ICD-10-CM

## 2023-10-20 DIAGNOSIS — Z51.81 ENCOUNTER FOR MONITORING CARDIOTOXIC DRUG THERAPY: ICD-10-CM

## 2023-10-20 DIAGNOSIS — I10 PRIMARY HYPERTENSION: ICD-10-CM

## 2023-10-20 DIAGNOSIS — C50.111 MALIGNANT NEOPLASM OF CENTRAL PORTION OF RIGHT BREAST IN FEMALE, ESTROGEN RECEPTOR POSITIVE: ICD-10-CM

## 2023-10-20 PROCEDURE — 93306 TTE W/DOPPLER COMPLETE: CPT | Performed by: INTERNAL MEDICINE

## 2023-11-15 ENCOUNTER — OFFICE VISIT (OUTPATIENT)
Dept: ORTHOPEDICS CLINIC | Facility: CLINIC | Age: 73
End: 2023-11-15
Payer: MEDICARE

## 2023-11-15 DIAGNOSIS — M16.11 PRIMARY OSTEOARTHRITIS OF RIGHT HIP: Primary | ICD-10-CM

## 2023-11-15 PROCEDURE — 20611 DRAIN/INJ JOINT/BURSA W/US: CPT | Performed by: ORTHOPAEDIC SURGERY

## 2023-11-15 RX ORDER — TRIAMCINOLONE ACETONIDE 40 MG/ML
40 INJECTION, SUSPENSION INTRA-ARTICULAR; INTRAMUSCULAR ONCE
Status: COMPLETED | OUTPATIENT
Start: 2023-11-15 | End: 2023-11-15

## 2023-11-15 RX ADMIN — TRIAMCINOLONE ACETONIDE 40 MG: 40 INJECTION, SUSPENSION INTRA-ARTICULAR; INTRAMUSCULAR at 11:51:00

## 2023-11-15 NOTE — PROCEDURES
She has concerns about proceeding with hip replacement due to nickel allergy. Has questions about steroid injections and bone degeneration. Risks and benefits of hip injection discussed with the patient, with risks including but not limited to pain and swelling at the injection site and/or within the hip joint, infection, elevation in blood pressure and/or glucose levels, facial flushing. After informed consent, the patient's right hip was marked, prepped with topical antiseptic, and locally anesthetized with skin refrigerant followed by injection of 1% lidocaine to create a skin wheal anteriorly at an insertion site a few fingerbreadths lateral to the femoral pulse, along the trajectory of the femoral neck. Next, the area was re-prepped with topical antiseptic, and ultrasound guidance was performed to direct a 20 gauge spinal needle into the hip joint at the junction of the femoral head and neck, after which a mixture of 1mL 40mg/mL Kenalog, 2mL 1% lidocaine and 2mL 0.5% marcaine was injected while visualizing the fluid under ultrasound. Confirmatory imaging was saved to the patient's chart. A band-aid was applied. The patient tolerated the procedure well.     Lamont Nix MD, [address] [facility]  Phone [phone]  Fax [phone]

## 2023-11-27 ENCOUNTER — TELEPHONE (OUTPATIENT)
Dept: INTERNAL MEDICINE CLINIC | Facility: CLINIC | Age: 73
End: 2023-11-27

## 2023-11-27 DIAGNOSIS — R73.03 PREDIABETES: ICD-10-CM

## 2023-11-27 DIAGNOSIS — I10 PRIMARY HYPERTENSION: ICD-10-CM

## 2023-11-27 DIAGNOSIS — N18.31 STAGE 3A CHRONIC KIDNEY DISEASE (HCC): Primary | ICD-10-CM

## 2023-11-27 NOTE — TELEPHONE ENCOUNTER
Patient has an AWV and DM check on 12/6/23. She usually has a blood test before her appts. Please place the orders and notify patient. Thank you!

## 2023-11-27 NOTE — TELEPHONE ENCOUNTER
Last labs 6/6/23. Do you want pt to repeat labs?      Future Appointments   Date Time Provider [place]   12/6/2023  2:20 PM Khoa Edgar, VALERIE [facility]   1/26/2024 12:40 PM PF [facility] [facility]

## 2023-12-04 ENCOUNTER — LAB ENCOUNTER (OUTPATIENT)
Dept: LAB | Age: 73
End: 2023-12-04
Attending: NURSE PRACTITIONER
Payer: MEDICARE

## 2023-12-04 DIAGNOSIS — R73.03 PREDIABETES: ICD-10-CM

## 2023-12-04 DIAGNOSIS — N18.31 STAGE 3A CHRONIC KIDNEY DISEASE (HCC): ICD-10-CM

## 2023-12-04 DIAGNOSIS — I10 PRIMARY HYPERTENSION: ICD-10-CM

## 2023-12-04 LAB
ANION GAP SERPL CALC-SCNC: 5 MMOL/L (ref 0–18)
BUN BLD-MCNC: 25 MG/DL (ref 9–23)
CALCIUM BLD-MCNC: 8.8 MG/DL (ref 8.5–10.1)
CHLORIDE SERPL-SCNC: 107 MMOL/L (ref 98–112)
CO2 SERPL-SCNC: 29 MMOL/L (ref 21–32)
CREAT BLD-MCNC: 1.04 MG/DL
EGFRCR SERPLBLD CKD-EPI 2021: 57 ML/MIN/1.73M2 (ref 60–?)
FASTING STATUS PATIENT QL REPORTED: YES
GLUCOSE BLD-MCNC: 103 MG/DL (ref 70–99)
OSMOLALITY SERPL CALC.SUM OF ELEC: 297 MOSM/KG (ref 275–295)
POTASSIUM SERPL-SCNC: 4 MMOL/L (ref 3.5–5.1)
SODIUM SERPL-SCNC: 141 MMOL/L (ref 136–145)

## 2023-12-04 PROCEDURE — 80048 BASIC METABOLIC PNL TOTAL CA: CPT

## 2023-12-04 PROCEDURE — 36415 COLL VENOUS BLD VENIPUNCTURE: CPT

## 2023-12-08 ENCOUNTER — OFFICE VISIT (OUTPATIENT)
Dept: INTERNAL MEDICINE CLINIC | Facility: CLINIC | Age: 73
End: 2023-12-08
Payer: MEDICARE

## 2023-12-08 VITALS
DIASTOLIC BLOOD PRESSURE: 72 MMHG | SYSTOLIC BLOOD PRESSURE: 118 MMHG | WEIGHT: 187 LBS | HEART RATE: 92 BPM | HEIGHT: 62 IN | BODY MASS INDEX: 34.41 KG/M2 | OXYGEN SATURATION: 97 % | TEMPERATURE: 97 F

## 2023-12-08 DIAGNOSIS — G47.33 OSA (OBSTRUCTIVE SLEEP APNEA): ICD-10-CM

## 2023-12-08 DIAGNOSIS — I50.32 CHRONIC HEART FAILURE WITH PRESERVED EJECTION FRACTION (HCC): ICD-10-CM

## 2023-12-08 DIAGNOSIS — R73.03 PREDIABETES: ICD-10-CM

## 2023-12-08 DIAGNOSIS — Z00.00 ENCOUNTER FOR ANNUAL WELLNESS EXAM IN MEDICARE PATIENT: Primary | ICD-10-CM

## 2023-12-08 DIAGNOSIS — N18.31 STAGE 3A CHRONIC KIDNEY DISEASE (HCC): ICD-10-CM

## 2023-12-08 DIAGNOSIS — I10 PRIMARY HYPERTENSION: ICD-10-CM

## 2023-12-08 DIAGNOSIS — E78.00 HYPERCHOLESTEROLEMIA: ICD-10-CM

## 2023-12-08 DIAGNOSIS — F51.01 PRIMARY INSOMNIA: ICD-10-CM

## 2023-12-08 DIAGNOSIS — M16.11 ARTHRITIS OF RIGHT HIP: ICD-10-CM

## 2023-12-08 DIAGNOSIS — E66.09 CLASS 1 OBESITY DUE TO EXCESS CALORIES WITH SERIOUS COMORBIDITY AND BODY MASS INDEX (BMI) OF 33.0 TO 33.9 IN ADULT: ICD-10-CM

## 2023-12-08 DIAGNOSIS — Z23 NEED FOR INFLUENZA VACCINATION: ICD-10-CM

## 2023-12-08 DIAGNOSIS — E03.9 ACQUIRED HYPOTHYROIDISM: ICD-10-CM

## 2023-12-08 DIAGNOSIS — Z23 NEED FOR VACCINATION: ICD-10-CM

## 2023-12-08 PROBLEM — M54.30 SCIATICA: Status: ACTIVE | Noted: 2023-12-08

## 2023-12-08 PROCEDURE — G0439 PPPS, SUBSEQ VISIT: HCPCS | Performed by: STUDENT IN AN ORGANIZED HEALTH CARE EDUCATION/TRAINING PROGRAM

## 2023-12-08 PROCEDURE — 1125F AMNT PAIN NOTED PAIN PRSNT: CPT | Performed by: STUDENT IN AN ORGANIZED HEALTH CARE EDUCATION/TRAINING PROGRAM

## 2023-12-08 PROCEDURE — 90662 IIV NO PRSV INCREASED AG IM: CPT | Performed by: STUDENT IN AN ORGANIZED HEALTH CARE EDUCATION/TRAINING PROGRAM

## 2023-12-08 PROCEDURE — G0008 ADMIN INFLUENZA VIRUS VAC: HCPCS | Performed by: STUDENT IN AN ORGANIZED HEALTH CARE EDUCATION/TRAINING PROGRAM

## 2023-12-08 RX ORDER — LEVOTHYROXINE SODIUM 0.07 MG/1
75 TABLET ORAL
Qty: 90 TABLET | Refills: 1 | Status: SHIPPED | OUTPATIENT
Start: 2023-12-08

## 2023-12-08 RX ORDER — FLUTICASONE PROPIONATE 50 MCG
1 SPRAY, SUSPENSION (ML) NASAL 2 TIMES DAILY
Qty: 3 EACH | Refills: 1 | Status: SHIPPED | OUTPATIENT
Start: 2023-12-08

## 2023-12-08 RX ORDER — CARVEDILOL 3.12 MG/1
3.12 TABLET ORAL 2 TIMES DAILY WITH MEALS
COMMUNITY
Start: 2023-11-13

## 2023-12-08 RX ORDER — SACUBITRIL AND VALSARTAN 24; 26 MG/1; MG/1
1 TABLET, FILM COATED ORAL 2 TIMES DAILY
COMMUNITY
Start: 2023-11-13

## 2023-12-08 RX ORDER — ESZOPICLONE 1 MG/1
1 TABLET, FILM COATED ORAL NIGHTLY PRN
Qty: 30 TABLET | Refills: 0 | Status: SHIPPED | OUTPATIENT
Start: 2023-12-08

## 2024-01-25 ENCOUNTER — TELEMEDICINE (OUTPATIENT)
Dept: INTERNAL MEDICINE CLINIC | Facility: CLINIC | Age: 74
End: 2024-01-25
Payer: MEDICARE

## 2024-01-25 VITALS — HEIGHT: 62 IN | WEIGHT: 187 LBS | BODY MASS INDEX: 34.41 KG/M2 | TEMPERATURE: 99 F

## 2024-01-25 DIAGNOSIS — U07.1 COVID-19: Primary | ICD-10-CM

## 2024-01-25 PROCEDURE — 99214 OFFICE O/P EST MOD 30 MIN: CPT | Performed by: NURSE PRACTITIONER

## 2024-01-25 NOTE — PATIENT INSTRUCTIONS
Go to the ER for any emergent symptoms. If you cannot get there safely call 911.     Start paxlovid. Monitor for side effects and effectiveness. Monitor for allergy.    Isolate per CDC guidelines    Take vitamins C and D    Do deep breathing exercises    Keep well hydrated     You can use robitussin DM or mucinex DM as directed on the bottle for cough and chest congestion.      Use cepacol for sore throat and dry cough as needed.      Use tylenol as needed for pain or fever     Follow up as needed or when routine care is due  Viral Upper Respiratory Illness (Adult)    You have a viral upper respiratory illness (URI), which is another term for the common cold. This illness is contagious during the first few days. It is spread through the air by coughing and sneezing. It may also be spread by direct contact (touching the sick person and then touching your own eyes, nose, or mouth). Frequent handwashing will decrease risk of spread. Most viral illnesses go away within 7 to 10 days with rest and simple home remedies. Sometimes the illness may last for several weeks. Antibiotics will not kill a virus, and they are generally not prescribed for this condition.  Home care  If symptoms are severe, rest at home for the first 2 to 3 days. When you resume activity, don't let yourself get too tired.  Don't smoke. If you need help stopping, talk with your healthcare provider.  Avoid being exposed to cigarette smoke (yours or others’).  You may use acetaminophen or ibuprofen to control pain and fever, unless another medicine was prescribed. If you have chronic liver or kidney disease, have ever had a stomach ulcer or gastrointestinal bleeding, or are taking blood-thinning medicines, talk with your healthcare provider before using these medicines. Aspirin should never be given to anyone under 18 years of age who is ill with a viral infection or fever. It may cause severe liver or brain damage.  Your appetite may be poor, so a light  diet is fine. Stay well hydrated by drinking 6 to 8 glasses of fluids per day (water, soft drinks, juices, tea, or soup). Extra fluids will help loosen secretions in the nose and lungs.  Over-the-counter cold medicines will not shorten the length of time you’re sick, but they may be helpful for the following symptoms: cough, sore throat, and nasal and sinus congestion. If you take prescription medicines, ask your healthcare provider or pharmacist which over-the-counter medicines are safe to use. (Note: Don't use decongestants if you have high blood pressure.)  Follow-up care  Follow up with your healthcare provider, or as advised.  When to seek medical advice  Call your healthcare provider right away if any of these occur:  Cough with lots of colored sputum (mucus)  Severe headache; face, neck, or ear pain  Difficulty swallowing due to throat pain  Fever of 100.4°F (38°C) or higher, or as directed by your healthcare provider  Call 911  Call 911 if any of these occur:  Chest pain, shortness of breath, wheezing, or difficulty breathing  Coughing up blood  Very severe pain with swallowing, especially if it goes along with a muffled voice   Date Last Reviewed: 6/1/2018  © 1370-4175 [employer]. [address] [place] [address], [place] [address]. All rights reserved. This information is not intended as a substitute for professional medical care. Always follow your healthcare professional's instructions.

## 2024-01-25 NOTE — PROGRESS NOTES
Virtual video Check-In    Vianney Daniel verbally consents to a Virtual/video Check-In visit on 01/25/24.  Patient has been referred to the [facility] website at [email or web address] to review the yearly Consent to Treat document.    Patient understands and accepts financial responsibility for any deductible, co-insurance and/or co-pays associated with this service.    Duration of the service: 20 minutes    Vianney Daniel is a 73 year old female.  CHIEF COMPLAINT   COVID    HPI:     Has cough, sore throat, HA, weak and fatigued, Nausea and vomited this am, diarrhea, poor appetite,     No sob, rashes.     Tested positive for COVID last night.     Is drinking some. Eating small things but not that much.      Current Outpatient Medications   Medication Sig Dispense Refill    carvedilol 3.125 MG Oral Tab Take 1 tablet (3.125 mg total) by mouth 2 (two) times daily with meals.      ENTRESTO 24-26 MG Oral Tab Take 1 tablet by mouth 2 (two) times daily.      Pseudoephedrine-Acetaminophen (SM NON-ASPRIN SINUS OR) LOW ASPRIN      eszopiclone 1 MG Oral Tab Take 1 tablet (1 mg total) by mouth nightly as needed. 30 tablet 0    levothyroxine 75 MCG Oral Tab Take 1 tablet (75 mcg total) by mouth before breakfast. 90 tablet 1    fluticasone propionate 50 MCG/ACT Nasal Suspension 1 spray by Nasal route 2 (two) times daily. 3 each 1    Ginkgo Biloba (GINKOBA OR) Take 240 mg by mouth every morning.      Omega 3-6-9 Fatty Acids (OMEGA 3-6-9 COMPLEX) Oral Cap Take 1 capsule by mouth daily.      Tart Molina 1200 MG Oral Cap Take 1 capsule by mouth as needed.      Magnesium 500 MG Oral Tab Take 1 tablet (500 mg total) by mouth daily.      Cholecalciferol (VITAMIN D3) 5000 UNITS Oral Cap Take 1 capsule (5,000 Units total) by mouth daily.      Calcium Carb-Cholecalciferol 500-600 MG-UNIT Oral Tab Take 1 tablet by mouth daily.        Biotin 5000 MCG Oral Tab Take 1 tablet (5 mg total) by mouth daily.      Vitamin B-12 500 MCG  Oral Tab Take 1 tablet (500 mcg total) by mouth daily.      Pyridoxine HCl (VITAMIN B-6) 100 MG Oral Tab Take 1 tablet (100 mg total) by mouth daily.      vitamin E 400 UNITS Oral Cap Take 1 capsule (400 Units total) by mouth daily.      Chromium-Cinnamon (CINNAMON PLUS CHROMIUM OR) Take 2,000 mg by mouth 2 (two) times a day.      Cranberry 500 MG Oral Cap Take 1 capsule by mouth 2 (two) times daily.        Lutein-Zeaxanthin 25-5 MG Oral Cap Take 1 capsule by mouth daily.      Ferrous Sulfate 325 (65 FE) MG Oral Tab Take 0.2 tablets (65 mg total) by mouth daily.      Vitamin C (VITAMIN C) 500 MG Oral Tab Take 1 tablet (500 mg total) by mouth daily.        Past Medical History:   Diagnosis Date    Acquired hypothyroidism 05/18/2017    Arthritis Dont know    Seeing a Chiroprator for ankles knees and hips especially ri    Arthritis of right hip 06/21/2022    Basal cell carcinoma (BCC) of right upper arm 01/20/2020    Breast cancer (HCC) 12/04/2014    right breast    Cancer (HCC)     CKD (chronic kidney disease) stage 3, GFR 30-59 ml/min ([facility]) 05/22/2018    Colon polyp, hyperplastic     Colon polyp, hyperplastic     COPD (chronic obstructive pulmonary disease) ([facility])     Essential hypertension     Heart disease     WEAKEND HEART    High blood pressure     History of adenomatous polyp of colon 03/21/2018    History of squamous cell carcinoma 11/05/2020    Left cheek, January 2018    HTN (hypertension)     Hypothyroidism     Obesity     Obstructive sleep apnea (adult) (pediatric) [facility] 1-30-16    AHI 15 supine AHI 22 non-supien AHI 8 SaO2 faith 68 %    Onychomycosis     JAMES on CPAP 02/19/2016    Osteoarthritis     Periorbital swelling     nickel allergy    Personal history of antineoplastic chemotherapy 07/13/2015    PONV (postoperative nausea and vomiting)     nausea    Prediabetes 06/21/2022    Rosacea     Sleep apnea     NO CPAP USE AT THIS TIME    Spinal stenosis of lumbar region at multiple levels 08/01/2013     Subclinical hypothyroidism 12/09/2016    Swelling of both ankles 04/20/2023    UTI (urinary tract infection)     Venous insufficiency     Visual impairment     glasses      Social History:  Social History     Socioeconomic History    Marital status:    Tobacco Use    Smoking status: Never    Smokeless tobacco: Never   Vaping Use    Vaping Use: Never used   Substance and Sexual Activity    Alcohol use: Yes     Alcohol/week: 0.0 - 1.0 standard drinks of alcohol     Comment: Social 1-2 month Never been drunk    Drug use: No   Other Topics Concern    Caffeine Concern No    Stress Concern No    Weight Concern Yes     Comment: Noom    Special Diet Yes     Comment: Noom    Exercise Yes    Seat Belt Yes        REVIEW OF SYSTEMS:   See HPI    EXAM:   Limited due to video visit  GENERAL: does not sound like they are in any acute distress on the video  LUNGS: They are able to phonate clearly without pausing due to sob, there was no coughing during the visit.  NEURO: Oriented times three      LABS:      Lab Results   Component Value Date    WBC 6.5 10/18/2023    RBC 4.54 10/18/2023    HGB 13.8 10/18/2023    HCT 42.0 10/18/2023    MCV 92.5 10/18/2023    MCH 30.4 10/18/2023    MCHC 32.9 10/18/2023    RDW 14.3 10/18/2023    .0 10/18/2023      Lab Results   Component Value Date     (H) 12/04/2023    BUN 25 (H) 12/04/2023    BUNCREA 25.3 (H) 10/12/2020    CREATSERUM 1.04 (H) 12/04/2023    ANIONGAP 5 12/04/2023    GFR 55 (L) 08/21/2017    GFRNAA 72 02/07/2022    GFRAA 83 02/07/2022    CA 8.8 12/04/2023    OSMOCALC 297 (H) 12/04/2023    ALKPHO 55 06/05/2023    AST 26 06/05/2023    ALT 41 06/05/2023    BILT 0.5 06/05/2023    TP 7.2 06/05/2023    ALB 3.7 06/05/2023    GLOBULIN 3.5 06/05/2023     12/04/2023    K 4.0 12/04/2023     12/04/2023    CO2 29.0 12/04/2023      Lab Results   Component Value Date    CHOLEST 183 06/05/2023    TRIG 90 06/05/2023    HDL 62 (H) 06/05/2023     (H) 06/05/2023     VLDL 15 06/05/2023    TCHDLRATIO 3.49 08/21/2017    NONHDLC 121 06/05/2023      Lab Results   Component Value Date    T4F 1.1 11/26/2018    TSH 0.601 10/18/2023      Lab Results   Component Value Date     06/05/2023    A1C 5.8 (H) 06/05/2023        ASSESSMENT AND PLAN:   1. COVID-19  - has covid. No sob. Lower kidney function.  - start low dose paxlovid. Pt agreeable- aware of risks and benefits.  - isolation per CDC  - supportive care with OTC meds discussed  - to ER as needed   - nirmatrelvir-ritonavir 150-100 MG Oral Tablet Therapy Pack; Take one nirmatrelvir tablet (150mg) with one ritonavir tablet (100mg) together twice daily for 5 days.   (Nirmatrelvir dose is renally adjusted)  Dispense: 20 tablet; Refill: 0      The patient indicates understanding of these issues and agrees to the plan.  The patient is asked to return as needed or when routine care is due.         Please note that the following visit was completed using two-way, real-time interactive audio and/or video communication.  This has been done in good nicole to provide continuity of care in the best interest of the provider-patient relationship, due to the ongoing public health crisis/national emergency and because of restrictions of visitation.  There are limitations of this visit as no physical exam could be performed.  Every conscious effort was taken to allow for sufficient and adequate time.  This billing was spent on reviewing labs, medications, radiology tests and decision making.  Appropriate medical decision-making and tests are ordered as detailed in the plan of care above.

## 2024-01-26 ENCOUNTER — TELEPHONE (OUTPATIENT)
Dept: INTERNAL MEDICINE CLINIC | Facility: CLINIC | Age: 74
End: 2024-01-26

## 2024-01-29 NOTE — TELEPHONE ENCOUNTER
Spoke to pt, she started paxlovid Thursday 1/25 evening and that night could not fall asleep until 6 am. Since then she has had sleep cylce reversed and only getting 4-5 hours at a time. She sates her symptoms of cough, sore throat, HA,  n/v/d and poor appetite have dissipated, no sob, cp or fevers. Appetite improving & she is hydrating well. She spoke with her pharmacist yesterday to advise on her sleep side effects as well & they told they had not heard of this as well, advised her it is okay to continue and take sleeping pill with paxlovid and she got some more sleep yesterday evening. She only has 3 doses left, she would like to finish out rx since her covid symptoms are improved with it.     If any other suggestions- please mychart patient today as she will be resting. Thanks!

## 2024-02-26 ENCOUNTER — LAB ENCOUNTER (OUTPATIENT)
Dept: LAB | Age: 74
End: 2024-02-26
Attending: INTERNAL MEDICINE
Payer: MEDICARE

## 2024-02-26 ENCOUNTER — HOSPITAL ENCOUNTER (OUTPATIENT)
Dept: CV DIAGNOSTICS | Age: 74
Discharge: HOME | End: 2024-02-26
Attending: INTERNAL MEDICINE
Payer: MEDICARE

## 2024-02-26 DIAGNOSIS — Z51.81 ENCOUNTER FOR THERAPEUTIC DRUG MONITORING: ICD-10-CM

## 2024-02-26 DIAGNOSIS — R06.09 DYSPNEA ON EXERTION: ICD-10-CM

## 2024-02-26 DIAGNOSIS — N18.31 CHRONIC KIDNEY DISEASE (CKD) STAGE G3A/A1, MODERATELY DECREASED GLOMERULAR FILTRATION RATE (GFR) BETWEEN 45-59 ML/MIN/1.73 SQUARE METER AND ALBUMINURIA CREATININE RATIO LESS THAN 30 MG/G (HCC): Primary | ICD-10-CM

## 2024-02-26 DIAGNOSIS — R06.09 DOE (DYSPNEA ON EXERTION): ICD-10-CM

## 2024-02-26 LAB
ALBUMIN SERPL-MCNC: 3.6 G/DL (ref 3.4–5)
ALBUMIN/GLOB SERPL: 1.1 {RATIO} (ref 1–2)
ALP LIVER SERPL-CCNC: 54 U/L
ALT SERPL-CCNC: 29 U/L
ANION GAP SERPL CALC-SCNC: 5 MMOL/L (ref 0–18)
AST SERPL-CCNC: 17 U/L (ref 15–37)
BILIRUB SERPL-MCNC: 0.4 MG/DL (ref 0.1–2)
BUN BLD-MCNC: 27 MG/DL (ref 9–23)
CALCIUM BLD-MCNC: 9.3 MG/DL (ref 8.5–10.1)
CHLORIDE SERPL-SCNC: 107 MMOL/L (ref 98–112)
CO2 SERPL-SCNC: 27 MMOL/L (ref 21–32)
CREAT BLD-MCNC: 0.88 MG/DL
EGFRCR SERPLBLD CKD-EPI 2021: 70 ML/MIN/1.73M2 (ref 60–?)
FASTING STATUS PATIENT QL REPORTED: YES
GLOBULIN PLAS-MCNC: 3.3 G/DL (ref 2.8–4.4)
GLUCOSE BLD-MCNC: 99 MG/DL (ref 70–99)
NT-PROBNP SERPL-MCNC: 107 PG/ML (ref ?–125)
OSMOLALITY SERPL CALC.SUM OF ELEC: 293 MOSM/KG (ref 275–295)
POTASSIUM SERPL-SCNC: 3.8 MMOL/L (ref 3.5–5.1)
PROT SERPL-MCNC: 6.9 G/DL (ref 6.4–8.2)
SODIUM SERPL-SCNC: 139 MMOL/L (ref 136–145)
TROPONIN I SERPL HS-MCNC: 4 NG/L

## 2024-02-26 PROCEDURE — 93306 TTE W/DOPPLER COMPLETE: CPT | Performed by: INTERNAL MEDICINE

## 2024-02-26 PROCEDURE — 36415 COLL VENOUS BLD VENIPUNCTURE: CPT

## 2024-02-26 PROCEDURE — 83880 ASSAY OF NATRIURETIC PEPTIDE: CPT

## 2024-02-26 PROCEDURE — 84484 ASSAY OF TROPONIN QUANT: CPT

## 2024-02-26 PROCEDURE — 80053 COMPREHEN METABOLIC PANEL: CPT

## 2024-02-27 ENCOUNTER — TELEPHONE (OUTPATIENT)
Facility: LOCATION | Age: 74
End: 2024-02-27

## 2024-02-27 NOTE — TELEPHONE ENCOUNTER
Patient would like to see Dr. Quiles, per her visit with Dr. Elizabeth, she needs an order for an US sent in.     Please advise

## 2024-03-02 DIAGNOSIS — I87.2 VENOUS INSUFFICIENCY: ICD-10-CM

## 2024-03-02 DIAGNOSIS — F51.01 PRIMARY INSOMNIA: ICD-10-CM

## 2024-03-02 DIAGNOSIS — I10 PRIMARY HYPERTENSION: ICD-10-CM

## 2024-03-04 ENCOUNTER — HOSPITAL ENCOUNTER (OUTPATIENT)
Dept: ULTRASOUND IMAGING | Age: 74
Discharge: HOME | End: 2024-03-04
Attending: SURGERY
Payer: MEDICARE

## 2024-03-04 DIAGNOSIS — N63.21 MASS OF UPPER OUTER QUADRANT OF LEFT BREAST: ICD-10-CM

## 2024-03-04 PROCEDURE — 76642 ULTRASOUND BREAST LIMITED: CPT | Performed by: SURGERY

## 2024-03-04 RX ORDER — LISINOPRIL 10 MG/1
10 TABLET ORAL DAILY
Qty: 90 TABLET | Refills: 1 | OUTPATIENT
Start: 2024-03-04

## 2024-03-04 RX ORDER — FUROSEMIDE 40 MG/1
40 TABLET ORAL EVERY OTHER DAY
Qty: 45 TABLET | Refills: 1 | OUTPATIENT
Start: 2024-03-04

## 2024-03-04 NOTE — TELEPHONE ENCOUNTER
Confirmed with patient she is no longer taking either of these, med list is up to date and she gets cardiac meds from . rx denied. Pt does need refill on lunesta.    Last OV relevant to medication: 12/8/23  Last refill date: 12/8/23 #30/refills: 0  When pt was asked to return for OV: 6/8/24  Upcoming appt/reason:   Future Appointments   Date Time Provider Department Center   3/19/2024  3:00 PM PF [facility] OUTPT LAB [place]   3/25/2024 10:45 AM Lara Quiles MD [facility] [address]   4/10/2024 11:20 AM Jl Boudreaux MD [facility] OR[facility]O [facility]   6/20/2024  3:00 PM Bailey Gibbs MD [facility] [address] [place]   6/26/2024  1:30 PM PF [facility] OUTPT LAB [place]   6/26/2024  2:00 PM PF CARD STRESS ECHO RM 1 PF CARD [place]   Was pt informed of any over due labs: utd

## 2024-03-04 NOTE — TELEPHONE ENCOUNTER
These say that they were discontinued at visit, do you know if pt is to continue these or do you need us to confirm with pt? Please advise, thanks!

## 2024-03-05 RX ORDER — ESZOPICLONE 1 MG/1
1 TABLET, FILM COATED ORAL NIGHTLY PRN
Qty: 30 TABLET | Refills: 0 | Status: SHIPPED | OUTPATIENT
Start: 2024-03-05

## 2024-03-19 ENCOUNTER — LAB ENCOUNTER (OUTPATIENT)
Dept: LAB | Age: 74
End: 2024-03-19
Attending: INTERNAL MEDICINE
Payer: MEDICARE

## 2024-03-19 DIAGNOSIS — R06.09 DOE (DYSPNEA ON EXERTION): Primary | ICD-10-CM

## 2024-03-19 DIAGNOSIS — Z79.899 ENCOUNTER FOR MONITORING CARDIOTOXIC DRUG THERAPY: ICD-10-CM

## 2024-03-19 DIAGNOSIS — C50.111 MALIGNANT NEOPLASM OF CENTRAL PORTION OF RIGHT BREAST IN FEMALE, ESTROGEN RECEPTOR POSITIVE (HCC): ICD-10-CM

## 2024-03-19 DIAGNOSIS — Z17.0 MALIGNANT NEOPLASM OF CENTRAL PORTION OF RIGHT BREAST IN FEMALE, ESTROGEN RECEPTOR POSITIVE (HCC): ICD-10-CM

## 2024-03-19 DIAGNOSIS — Z51.81 ENCOUNTER FOR MONITORING CARDIOTOXIC DRUG THERAPY: ICD-10-CM

## 2024-03-19 LAB
ANION GAP SERPL CALC-SCNC: <0 MMOL/L (ref 0–18)
BUN BLD-MCNC: 32 MG/DL (ref 9–23)
CALCIUM BLD-MCNC: 9.2 MG/DL (ref 8.5–10.1)
CHLORIDE SERPL-SCNC: 112 MMOL/L (ref 98–112)
CO2 SERPL-SCNC: 29 MMOL/L (ref 21–32)
CREAT BLD-MCNC: 1.06 MG/DL
EGFRCR SERPLBLD CKD-EPI 2021: 56 ML/MIN/1.73M2 (ref 60–?)
FASTING STATUS PATIENT QL REPORTED: NO
GLUCOSE BLD-MCNC: 106 MG/DL (ref 70–99)
OSMOLALITY SERPL CALC.SUM OF ELEC: 297 MOSM/KG (ref 275–295)
POTASSIUM SERPL-SCNC: 3.9 MMOL/L (ref 3.5–5.1)
SODIUM SERPL-SCNC: 140 MMOL/L (ref 136–145)

## 2024-03-19 PROCEDURE — 36415 COLL VENOUS BLD VENIPUNCTURE: CPT

## 2024-03-19 PROCEDURE — 80048 BASIC METABOLIC PNL TOTAL CA: CPT

## 2024-03-25 ENCOUNTER — OFFICE VISIT (OUTPATIENT)
Dept: SURGERY | Facility: CLINIC | Age: 74
End: 2024-03-25
Payer: MEDICARE

## 2024-03-25 DIAGNOSIS — C50.911 MALIGNANT NEOPLASM OF RIGHT FEMALE BREAST, UNSPECIFIED ESTROGEN RECEPTOR STATUS, UNSPECIFIED SITE OF BREAST (HCC): Primary | ICD-10-CM

## 2024-03-25 DIAGNOSIS — Z90.13 HISTORY OF BILATERAL MASTECTOMY: ICD-10-CM

## 2024-03-25 NOTE — PROGRESS NOTES
Follow Up Visit Note       Active Problems      1. Malignant neoplasm of right female breast, unspecified estrogen receptor status, unspecified site of breast (HCC)    2. History of bilateral mastectomy          Chief Complaint   Chief Complaint   Patient presents with    Follow - Up     EP - previous [facility] patient,  left breast done 3/4        Allergies  Vianney is allergic to adhesive tape, bacitracin, codeine, doxycycline, levaquin [levofloxacin], neosporin [neomycin-bacitracin-polymyxin], nickel, other, pcns [penicillins], polysporin [bacitracin-polymyxin b], and soap.    Past Medical / Surgical / Social / Family History    The past medical and past surgical history have been reviewed by me today.    Past Medical History:   Diagnosis Date    Acquired hypothyroidism 05/18/2017    Arthritis Dont know    Seeing a Chiroprator for ankles knees and hips especially ri    Arthritis of right hip 06/21/2022    Basal cell carcinoma (BCC) of right upper arm 01/20/2020    Breast cancer ([facility]) 12/04/2014    right breast    Cancer ([facility])     CKD (chronic kidney disease) stage 3, GFR 30-59 ml/min ([facility]) 05/22/2018    Colon polyp, hyperplastic     Colon polyp, hyperplastic     COPD (chronic obstructive pulmonary disease) ([facility])     Essential hypertension     Heart disease     WEAKEND HEART    High blood pressure     History of adenomatous polyp of colon 03/21/2018    History of squamous cell carcinoma 11/05/2020    Left cheek, January 2018    HTN (hypertension)     Hypothyroidism     Obesity     Obstructive sleep apnea (adult) (pediatric) Edward PSG 1-30-16    AHI 15 supine AHI 22 non-supien AHI 8 SaO2 faith 68 %    Onychomycosis     JAMES on CPAP 02/19/2016    Osteoarthritis     Periorbital swelling     nickel allergy    Personal history of antineoplastic chemotherapy 07/13/2015    PONV (postoperative nausea and vomiting)     nausea    Prediabetes 06/21/2022    Rosacea     Sleep apnea     NO CPAP USE AT THIS TIME    Spinal stenosis  of lumbar region at multiple levels 2013    Subclinical hypothyroidism 2016    Swelling of both ankles 2023    UTI (urinary tract infection)     Venous insufficiency     Visual impairment     glasses     Past Surgical History:   Procedure Laterality Date    ARTHROSCOPY OF JOINT UNLISTED      BIOPSY OF BREAST, NEEDLE CORE      x3  benign    BREAST RECONSTRUCTION Bilateral 2/23/15    with (FEMI)abdominal free flap    COLONOSCOPY  age 57 yrs    COLONOSCOPY      COLONOSCOPY N/A 3/16/2018    Procedure: COLONOSCOPY, POSSIBLE BIOPSY, POSSIBLE POLYPECTOMY 04186;  Surgeon: Sonja Cedillo MD;  Location: [facility], [facility]    D & C      MASTECTOMY LEFT      MASTECTOMY RIGHT      MRI BREAST BIOPSY RIGHT Right 1/13/15    NEEDLE BIOPSY LEFT      NEEDLE BIOPSY RIGHT            OTHER Bilateral 2/23/15    mastectomies      OTHER  2022    LEFT FRONTAL NEEDLE BIOPSY OF CALVARIAL LESION WITH NEURO NAVIGATIONLeftGeneral    OTHER SURGICAL HISTORY  03/16/15    power port    OTHER SURGICAL HISTORY  2015    Bilateral mastectomy    OTHER SURGICAL HISTORY  2018    excision skin left cheek- actinic keratosis    SHOULDER SURG PROC UNLISTED Left     removal of benign lump    SKIN SURGERY      TONSILLECTOMY      US BREAST BIOPSY Right 12/10/14       The family history and social history have been reviewed by me today.    Social History     Tobacco Use    Smoking status: Never    Smokeless tobacco: Never   Substance Use Topics    Alcohol use: Yes     Alcohol/week: 0.0 - 1.0 standard drinks of alcohol     Comment: Social 1-2 month Never been drunk        Current Outpatient Medications:     eszopiclone 1 MG Oral Tab, Take 1 tablet (1 mg total) by mouth nightly as needed., Disp: 30 tablet, Rfl: 0    carvedilol 3.125 MG Oral Tab, Take 1 tablet (3.125 mg total) by mouth 2 (two) times daily with meals., Disp: , Rfl:     ENTRESTO 24-26 MG Oral Tab, Take 1 tablet by mouth 2 (two) times daily., Disp: ,  Rfl:     Pseudoephedrine-Acetaminophen (SM NON-ASPRIN SINUS OR), LOW ASPRIN, Disp: , Rfl:     levothyroxine 75 MCG Oral Tab, Take 1 tablet (75 mcg total) by mouth before breakfast., Disp: 90 tablet, Rfl: 1    fluticasone propionate 50 MCG/ACT Nasal Suspension, 1 spray by Nasal route 2 (two) times daily., Disp: 3 each, Rfl: 1    Ginkgo Biloba (GINKOBA OR), Take 240 mg by mouth every morning., Disp: , Rfl:     Omega 3-6-9 Fatty Acids (OMEGA 3-6-9 COMPLEX) Oral Cap, Take 1 capsule by mouth daily., Disp: , Rfl:     Tart Cherry 1200 MG Oral Cap, Take 1 capsule by mouth as needed., Disp: , Rfl:     Magnesium 500 MG Oral Tab, Take 1 tablet (500 mg total) by mouth daily., Disp: , Rfl:     Cholecalciferol (VITAMIN D3) 5000 UNITS Oral Cap, Take 1 capsule (5,000 Units total) by mouth daily., Disp: , Rfl:     Calcium Carb-Cholecalciferol 500-600 MG-UNIT Oral Tab, Take 1 tablet by mouth daily.  , Disp: , Rfl:     Biotin 5000 MCG Oral Tab, Take 1 tablet (5 mg total) by mouth daily., Disp: , Rfl:     Vitamin B-12 500 MCG Oral Tab, Take 1 tablet (500 mcg total) by mouth daily., Disp: , Rfl:     Pyridoxine HCl (VITAMIN B-6) 100 MG Oral Tab, Take 1 tablet (100 mg total) by mouth daily., Disp: , Rfl:     vitamin E 400 UNITS Oral Cap, Take 1 capsule (400 Units total) by mouth daily., Disp: , Rfl:     Chromium-Cinnamon (CINNAMON PLUS CHROMIUM OR), Take 2,000 mg by mouth 2 (two) times a day., Disp: , Rfl:     Cranberry 500 MG Oral Cap, Take 1 capsule by mouth 2 (two) times daily.  , Disp: , Rfl:     Lutein-Zeaxanthin 25-5 MG Oral Cap, Take 1 capsule by mouth daily., Disp: , Rfl:     Ferrous Sulfate 325 (65 FE) MG Oral Tab, Take 0.2 tablets (65 mg total) by mouth daily., Disp: , Rfl:     Vitamin C (VITAMIN C) 500 MG Oral Tab, Take 1 tablet (500 mg total) by mouth daily., Disp: , Rfl:      Review of Systems  The Review of Systems has been reviewed by me during today.  Review of Systems   Constitutional:  Negative for diaphoresis, fever and  unexpected weight change.   HENT:  Negative for congestion, nosebleeds, sore throat and trouble swallowing.    Eyes:  Negative for pain, discharge, redness and visual disturbance.   Respiratory:  Negative for cough, shortness of breath and wheezing.    Cardiovascular:  Negative for chest pain and palpitations.   Gastrointestinal:  Negative for abdominal distention, abdominal pain, blood in stool, constipation, diarrhea, nausea and vomiting.   Genitourinary:  Negative for difficulty urinating, dysuria and frequency.   Musculoskeletal:  Positive for arthralgias and myalgias. Negative for joint swelling and neck pain.   Skin:  Negative for color change and rash.   Neurological:  Negative for dizziness, syncope and light-headedness.   Hematological:  Negative for adenopathy. Does not bruise/bleed easily.   Psychiatric/Behavioral:  Negative for confusion, hallucinations and sleep disturbance.         Physical Findings   There were no vitals taken for this visit.  Physical Exam  Constitutional:       Appearance: She is well-developed.   HENT:      Head: Normocephalic and atraumatic.   Eyes:      Pupils: Pupils are equal, round, and reactive to light.   Cardiovascular:      Rate and Rhythm: Normal rate and regular rhythm.   Pulmonary:      Effort: Pulmonary effort is normal.      Breath sounds: Normal breath sounds.   Chest:          Comments: No abnormality noted in the 1 o'clock position left breast  Abdominal:      General: Bowel sounds are normal. There is no distension.      Palpations: Abdomen is soft.      Tenderness: There is no abdominal tenderness.   Musculoskeletal:         General: No deformity. Normal range of motion.   Skin:     General: Skin is warm and dry.      Findings: No rash.   Neurological:      Mental Status: She is alert and oriented to person, place, and time.                History of Present Illness    Today, I am seeing this patient for Dr. Elizabeth for continued follow-up  Martha is here today for  continued surveillance examination status post diagnosis breast cancer    Martha is status post diagnosis right breast cancer in February 2015.  At that time she underwent right mastectomy, sentinel lymph node biopsy with immediate FEMI flap reconstruction for right breast cancer.  Final pathology 1.5 cm IDC, 3 lymph nodes negative, %, HER2 positive.  She also underwent concurrent prophylactic left mastectomy with immediate FEMI flap reconstruction.  Final pathology was negative for atypia or malignancy    Patient did have genetic testing at that time and was found to have a PALB2 gene mutation of uncertain significance.    Postoperatively patient did undergo Herceptin therapy.  She does see Dr. Thakur of cardiology service for ongoing cardiac issues which is thought to be related to chemotherapy.    Family history-patient's sister was diagnosed with breast cancer at the age of 60 and underwent bilateral mastectomy  Patient's daughter diagnosed with breast cancer initially at the age of 46 subsequently had multiple recurrences and did undergo eventual bilateral mastectomy.  No family history ovarian cancer or other malignancy    Since surgery, the patient has had multiple subcutaneous nodules and cysts which have been monitored with annual bilateral ultrasounds.    Left breast ultrasound in October 2023 revealed a new 4 to 5 mm nodule in the 1 o'clock position of the left breast.  6-month follow-up ultrasound of the left breast performed March 4, 2024 was negative for any cystic or solid lesions.    On physical examination, multiple tender nodules are identified as above    Continued sonographic surveillance of bilateral breast nodules is recommended with annual bilateral whole breast ultrasound.  Next sonographic surveillance due July 2024    Assessment   1. Malignant neoplasm of right female breast, unspecified estrogen receptor status, unspecified site of breast (HCC)    2. History of bilateral mastectomy         Plan     Continue monthly self-examination  Continue annual sonographic surveillance of bilateral breast nodules.  Next bilateral whole breast ultrasound ordered for July 2024  Follow-up after ultrasound for repeat examination    Thank you for allowing me to participate in your patient's care       No orders of the defined types were placed in this encounter.      Imaging & Referrals   US BREAST BILATERAL COMPLETE (CPT=76641-50)    Follow Up  No follow-ups on file.    Lara Quiles MD      Please note that this report has been produced using speech recognition software and may contain errors related to that system including but not limited to errors in grammar, punctuation and spelling as well as words and phrases that possibly may have been recognized inappropriately.  If there are any questions or concerns please contact the dictating provider for clarification.

## 2024-04-15 ENCOUNTER — TELEPHONE (OUTPATIENT)
Dept: INTERNAL MEDICINE CLINIC | Facility: CLINIC | Age: 74
End: 2024-04-15

## 2024-04-15 ENCOUNTER — OFFICE VISIT (OUTPATIENT)
Dept: INTERNAL MEDICINE CLINIC | Facility: CLINIC | Age: 74
End: 2024-04-15
Payer: MEDICARE

## 2024-04-15 VITALS
RESPIRATION RATE: 20 BRPM | WEIGHT: 187 LBS | OXYGEN SATURATION: 98 % | HEIGHT: 62 IN | DIASTOLIC BLOOD PRESSURE: 76 MMHG | TEMPERATURE: 97 F | HEART RATE: 76 BPM | SYSTOLIC BLOOD PRESSURE: 120 MMHG | BODY MASS INDEX: 34.41 KG/M2

## 2024-04-15 DIAGNOSIS — M54.31 RIGHT SIDED SCIATICA: ICD-10-CM

## 2024-04-15 DIAGNOSIS — G57.01 PIRIFORMIS SYNDROME OF RIGHT SIDE: Primary | ICD-10-CM

## 2024-04-15 PROBLEM — J42 CHRONIC BRONCHITIS (HCC): Status: ACTIVE | Noted: 2024-04-15

## 2024-04-15 PROCEDURE — 99213 OFFICE O/P EST LOW 20 MIN: CPT | Performed by: STUDENT IN AN ORGANIZED HEALTH CARE EDUCATION/TRAINING PROGRAM

## 2024-04-15 RX ORDER — LUTEIN 10 MG
TABLET ORAL
COMMUNITY
Start: 2024-02-28 | End: 2024-04-15 | Stop reason: ALTCHOICE

## 2024-04-15 RX ORDER — SACUBITRIL AND VALSARTAN 49; 51 MG/1; MG/1
1 TABLET, FILM COATED ORAL 2 TIMES DAILY
COMMUNITY
Start: 2024-03-25

## 2024-04-15 RX ORDER — RIBOFLAVIN (VITAMIN B2) 100 MG
TABLET ORAL
COMMUNITY
Start: 2022-06-15

## 2024-04-15 RX ORDER — METHYLPREDNISOLONE 4 MG/1
TABLET ORAL
Qty: 1 EACH | Refills: 0 | Status: SHIPPED | OUTPATIENT
Start: 2024-04-15

## 2024-04-15 NOTE — TELEPHONE ENCOUNTER
E-scribe did not go through for some reason. Called pharmacy and gave verbal order. Left message for pt to notify.

## 2024-04-15 NOTE — PROGRESS NOTES
Chief Complaint   Patient presents with    Pain     DX: with Spinal stenosis since 2006/ history sciatica 3/23 was under controled with (PT)   Symptoms worsening  back and leg, noticed tingling/pins and needles to foot x1 month but notices it varies in intensity/frequency.        SUBJECTIVE & A/P:  Vianney Daniel is a 73 year old female with pmhx of HTN, HLD, hypothyroidism, prediabetes, Obesity, CKD stage 3a, JAMES, hx of breast cancer s/p chemo in remission who presents for R leg pain.     //R Leg sciatica, concern for piriformis syndrome  //Moderate spinal canal stenosis at L4-L5  //Degenerative disc disease  Lumbar XR 3/2023 with diffuse degenerative changes   Since christmas symptoms are worse including leg pain and has gone back to exercise classes in the beginning of March. Exercises has helped with weight loss. In the last 1-2 months she has noted pins and needles in her R foot. Symptoms start in the side of her thigh and move to the front of the leg. Goes to chiropracter and it helps for a few days and goes back again. On Sunday when not in exercise class, symptoms are worse. Feels this in buttock area and the side of the leg and moves to the front. Feels like an ache taht it will not go away. She pushes the pain spot and it helps. Getting hip injections. Low impact aerobics. Similar episode occurred last year and was seen by ortho and recommendation was to pursue epidural injections if not improved.   PLAN:   MRI lumbar spine 2023 showed moderage DJD of the lumbar spine at multi levels and moderate spinal canal stenosis at L4-L5. Reflexes intact on exam, denies bowel or bladder incontinence. Straight leg positive on R and symptoms consistent with sciatica flare. Exam also concerning for piriformis syndrome as a potential etiology. Treat with steroids and PT.   -Ordered medrol dose pack.   -PT consulted appreciate recs  -Recommend patient to follow-up with ortho if symptoms do not resolve for potential  epidural injection as previously discussed.     //R leg contraction knots  Patient reports small knots that she can feel in her muscles on her leg. Concerned if this is something she should be worried about. Can be uncomfortable when pressed on, but otherwise no pain.   PLAN:   Exam notable for presence only with palpation during contraction of the quadricep muscle. Notable above the knee about 1cm in size and additional one located laterally on the side of the thigh as well. Some tenderness noted with palpation. Coincides with flare of sciatica. CTM. If increase in size or more visible, would consider ultrasound of the area to evaluate further. Ddx less concerning for vein pathology, mass. Most recent vein ultrasound negative per review.      //Hypercholesterolemia  PLAN:    6/2023. CAC score of 0 8/2022. Diet controlled. Should be on a statin due to elevated ASCVD risk. Patient refuses at this time. Did discuss CAC score does not show new plaque or soft plaque. Only shows old calcified plaques.   The 10-year ASCVD risk score (Aimee DK, et al., 2019) is: 11.9%    Values used to calculate the score:      Age: 73 years      Sex: Female      Is Non- : No      Diabetic: No      Tobacco smoker: No      Systolic Blood Pressure: 120 mmHg      Is BP treated: Yes      HDL Cholesterol: 62 mg/dL      Total Cholesterol: 183 mg/dL    //Chronic heart failure with preserved ejection fraction (HCC)  Expresses concern that increase in entresto has been causing a decrease in her energy and is afraid of increasing dose further as recommended by Dr. Tapia. Plan is to repeat echo in 3 months to reevaluate GLS and EF and if further decreasing may need to discuss dose up titration.   PLAN:   Most recent echo with stable GLS, but slight decrease in EF from 55 to 50%.   -Follows with Dr. Tapia. Started on entresto and carvedilol. Follow-up echo in 3 months.     PROBLEMS NOT DISCUSSED TODAY:  //Primary  hypertension  PLAN:   At goal.   -Entresto 49-51mg BID.   -Carvedilol 3.125mg BID.     //Acquired hypothyroidism  PLAN:   TSH at goal as of 10/2023.   -Continue levothyroxine 75mcg qAM daily.     //Class 1 obesity due to excess calories with serious comorbidity and body mass index (BMI) of 33.0 to 33.9 in adult  Exercising regularly in classes. Monitoring diet.   PLAN:   CTM     //Prediabetes  PLAN:   A1c 5.8%. Stable, CTM     //Stage 3a chronic kidney disease (HCC)  PLAN:   Stable, CTM.      //JAMES (obstructive sleep apnea)  PLAN:  Mild, positional. CTM not on CPAP.      //Primary insomnia  PLAN:   -Continue eszopiclone 1mg at bedtime.     //Arthritis of right hip  PLAN:   Hip XR 2023 with evidence of mild osteoarthritic changes in both femoral acetabular joints R>L.  -Following with ortho for joint injections and PT.     //Patient report of mild COPD  Denies shortness of breath, chronic cough, chest pressure.   PLAN:  2014 PFTs:With no evidenec of airway obstruction, Restriction noted, but total lung capacity wnls. Diffusion capacity is mild decreased but correct into the normal range when alveolar lung volumes are taken into account. Discussed with patient that this does not show evidence of COPD and as she is asymptomatic no further concerns at this time.     HEALTH MAINTENANCE:   -Vaccinations: Prevnar complete, Shingrix done, Flu done, COVID done  -Colonoscopy: 03/16/2018  -Mammogram: 07/05/2023  -Pap Smear: Tested out.   -Diabetes screening: Last A1c value was 5.8% done 6/5/2023.  -Lipid screening: Cholesterol: 183, done on 6/5/2023.  HDL Cholesterol: 62, done on 6/5/2023.  TriGlycerides 90, done on 6/5/2023.  LDL Cholesterol: 105, done on 6/5/2023.   -Birth Control: post menopausal   -Smoking: none  -Alcohol: rare   -Marijuana: none  -Recreational drug: none    Return if symptoms worsen or fail to improve.    Bailey Gibbs MD  Internal Medicine     Past Medical History:   Past Medical History:    Acquired  hypothyroidism    Arthritis    Seeing a Chiroprator for ankles knees and hips especially ri    Arthritis of right hip    Basal cell carcinoma (BCC) of right upper arm    Breast cancer (HCC)    right breast    Cancer (HCC)    CKD (chronic kidney disease) stage 3, GFR 30-59 ml/min (HCC)    Colon polyp, hyperplastic    Colon polyp, hyperplastic    COPD (chronic obstructive pulmonary disease) (HCC)    Essential hypertension    Heart disease    WEAKEND HEART    High blood pressure    History of adenomatous polyp of colon    History of squamous cell carcinoma    Left cheek, 2018    HTN (hypertension)    Hypothyroidism    Obesity    Obstructive sleep apnea (adult) (pediatric)    AHI 15 supine AHI 22 non-supien AHI 8 SaO2 faith 68 %    Onychomycosis    JAMES on CPAP    Osteoarthritis    Periorbital swelling    nickel allergy    Personal history of antineoplastic chemotherapy    PONV (postoperative nausea and vomiting)    nausea    Prediabetes    Rosacea    Sleep apnea    NO CPAP USE AT THIS TIME    Spinal stenosis of lumbar region at multiple levels    Subclinical hypothyroidism    Swelling of both ankles    UTI (urinary tract infection)    Venous insufficiency    Visual impairment    glasses        Past Surgical History:   Past Surgical History:   Procedure Laterality Date    Arthroscopy of joint unlisted      Biopsy of breast, needle core      x3  benign    Breast reconstruction Bilateral 2/23/15    with (FEMI)abdominal free flap    Colonoscopy  age 57 yrs    Colonoscopy      Colonoscopy N/A 3/16/2018    Procedure: COLONOSCOPY, POSSIBLE BIOPSY, POSSIBLE POLYPECTOMY 50841;  Surgeon: Sonja Cedillo MD;  Location: [facility]    D & c      Mastectomy left      Mastectomy right      Mri breast biopsy right Right 1/13/15    Needle biopsy left      Needle biopsy right            Other Bilateral 2/23/15    mastectomies      Other  2022    LEFT FRONTAL NEEDLE BIOPSY OF CALVARIAL LESION WITH NEURO  NAVIGATIONLeftGeneral    Other surgical history  03/16/15    power port    Other surgical history  2/2015    Bilateral mastectomy    Other surgical history  03/01/2018    excision skin left cheek- actinic keratosis    Shoulder surg proc unlisted Left 1990    removal of benign lump    Skin surgery      Tonsillectomy      Us breast biopsy Right 12/10/14       Current Medications:    Current Outpatient Medications   Medication Sig Dispense Refill    ENTRESTO 49-51 MG Oral Tab Take 1 tablet by mouth 2 (two) times daily.      riboflavin 100 MG Oral Tab Vitamin B-2  100 mg tablet, [RxNorm: 922374]      methylPREDNISolone (MEDROL) 4 MG Oral Tablet Therapy Pack As directed. 1 each 0    eszopiclone 1 MG Oral Tab Take 1 tablet (1 mg total) by mouth nightly as needed. 30 tablet 0    carvedilol 3.125 MG Oral Tab Take 1 tablet (3.125 mg total) by mouth 2 (two) times daily with meals.      Pseudoephedrine-Acetaminophen (SM NON-ASPRIN SINUS OR) LOW ASPRIN      levothyroxine 75 MCG Oral Tab Take 1 tablet (75 mcg total) by mouth before breakfast. 90 tablet 1    fluticasone propionate 50 MCG/ACT Nasal Suspension 1 spray by Nasal route 2 (two) times daily. 3 each 1    Ginkgo Biloba (GINKOBA OR) Take 240 mg by mouth every morning.      Omega 3-6-9 Fatty Acids (OMEGA 3-6-9 COMPLEX) Oral Cap Take 1 capsule by mouth daily.      Tart Molina 1200 MG Oral Cap Take 1 capsule by mouth as needed.      Magnesium 500 MG Oral Tab Take 1 tablet (500 mg total) by mouth daily.      Cholecalciferol (VITAMIN D3) 5000 UNITS Oral Cap Take 1 capsule (5,000 Units total) by mouth daily.      Calcium Carb-Cholecalciferol 500-600 MG-UNIT Oral Tab Take 1 tablet by mouth daily.        Biotin 5000 MCG Oral Tab Take 1 tablet (5 mg total) by mouth daily.      Vitamin B-12 500 MCG Oral Tab Take 1 tablet (500 mcg total) by mouth daily.      Pyridoxine HCl (VITAMIN B-6) 100 MG Oral Tab Take 1 tablet (100 mg total) by mouth daily.      vitamin E 400 UNITS Oral Cap  Take 1 capsule (400 Units total) by mouth daily.      Chromium-Cinnamon (CINNAMON PLUS CHROMIUM OR) Take 2,000 mg by mouth 2 (two) times a day.      Cranberry 500 MG Oral Cap Take 1 capsule by mouth 2 (two) times daily.        Lutein-Zeaxanthin 25-5 MG Oral Cap Take 1 capsule by mouth daily.      Ferrous Sulfate 325 (65 FE) MG Oral Tab Take 0.2 tablets (65 mg total) by mouth daily.      Vitamin C (VITAMIN C) 500 MG Oral Tab Take 1 tablet (500 mg total) by mouth daily.         PHYSICAL EXAM:   /76 (BP Location: Right arm, Patient Position: Sitting, Cuff Size: large)   Pulse 76   Temp 97.2 °F (36.2 °C)   Resp 20   Ht 5' 2\" (1.575 m)   Wt 187 lb (84.8 kg)   SpO2 98%   BMI 34.20 kg/m²  Body mass index is 34.2 kg/m².   General: No acute distress. Alert and oriented x 3.  Neurologic: PERRLA. EOM intact. Straight leg positive on the R. No notable weakness on examination. Patellar reflex equal bilaterally. No evidence of hyperreflexia or fasciculations of the leg muscles on examination. Point tenderness noted overlying the piriformis muscle on palpation. No lumbar spine tenderness.   Psychiatric: Appropriate mood and affect.    LABS:   Lab Results   Component Value Date    A1C 5.8 (H) 06/05/2023    A1C 6.0 (H) 06/07/2022      Lab Results   Component Value Date     (H) 03/19/2024    BUN 32 (H) 03/19/2024    BUNCREA 25.3 (H) 10/12/2020    CREATSERUM 1.06 (H) 03/19/2024    ANIONGAP <0 (L) 03/19/2024    GFR 55 (L) 08/21/2017    GFRNAA 72 02/07/2022    GFRAA 83 02/07/2022    CA 9.2 03/19/2024    OSMOCALC 297 (H) 03/19/2024    ALKPHO 54 (L) 02/26/2024    AST 17 02/26/2024    ALT 29 02/26/2024    BILT 0.4 02/26/2024    TP 6.9 02/26/2024    ALB 3.6 02/26/2024    GLOBULIN 3.3 02/26/2024     03/19/2024    K 3.9 03/19/2024     03/19/2024    CO2 29.0 03/19/2024      Lab Results   Component Value Date    WBC 6.5 10/18/2023    RBC 4.54 10/18/2023    HGB 13.8 10/18/2023    HCT 42.0 10/18/2023    MCV 92.5  10/18/2023    MCH 30.4 10/18/2023    MCHC 32.9 10/18/2023    RDW 14.3 10/18/2023    .0 10/18/2023        IMAGING:   Ultrasound venous insufficiency 8/24/2022:   CONCLUSION:    Bilateral varicosities as detailed above.   Left Baker cyst.   Criteria for Venous Insufficiency:   CFV, SFV, POP, PVT'S, PERONEALS:  Reflux of >0.5 seconds or more at 7 cm/sec is abnormal.    NORMAL (Competent) is less than 0.5 seconds of reflux   MILD is between 0.5 to 1.0 seconds of reflux   MODERATE is 1.0 to 3.0 seconds of reflux   SEVERE reflux is greater than 3.0 seconds or high velocity reflux for a brief time.

## 2024-04-15 NOTE — TELEPHONE ENCOUNTER
Pt called stating that her she had worsening pain to back and leg, has noticed tingling/pins and needles to foot x1 month but notices it varies in intensity/frequency. Has no loss of b/b or loss of coordination. Apt scheduled for eval.    Future Appointments   Date Time Provider Department Center   4/15/2024  1:00 PM Bailey Gibbs MD [facility] [place]   4/22/2024  8:00 AM Jl Boudreaux MD [facility] ORTHO 75 [facility]   4/24/2024  3:00 PM PF [facility] PF OUTPT LAB [place]   6/13/2024  3:00 PM Bailey Gibbs MD [facility] [place]   6/26/2024  1:30 PM PF [facility] PF OUTPT LAB [place]   6/26/2024  1:45 PM PF [facility] PF OUTPT LAB [place]   6/26/2024  2:00 PM PF CARD STRESS ECHO RM 1 PF CARD [place]   7/8/2024  9:00 AM PF US RM2 PF US [place]   7/9/2024  1:00 PM Lara Quiles MD [facility] [id number]

## 2024-04-16 PROBLEM — J42 CHRONIC BRONCHITIS (HCC): Status: RESOLVED | Noted: 2024-04-15 | Resolved: 2024-04-16

## 2024-04-18 ENCOUNTER — TELEPHONE (OUTPATIENT)
Dept: ORTHOPEDICS CLINIC | Facility: CLINIC | Age: 74
End: 2024-04-18

## 2024-04-18 NOTE — PATIENT INSTRUCTIONS
//R leg sciatica, concern for piriformis syndrome   -Ordered medrol dose pack.   -PT consulted.   -Recommend follow-up with ortho if symptoms do not resolve for potential epidural injection evaluation.     //R leg muscle contraction knots  The small little knots appreciated in your quadriceps muscle when contracted is likely contraction knots. These are benign and usually release with massaging. Continue to monitor. If size progresses regardless if muscle is contracted or not, would potentially ultrasound that location.     //High blood pressure - at goal today     //Heart failure with preserved ejection fraction 2/2 chemo  Continue to follow with cardiology. Discuss entresto dosing with them. You expressed feeling very tired at the current dosage of entresto. Would be reasonable not to go up at this time.     //High cholesterol   You are not on a statin. Your stroke and heart attack risk in the next 10 years is 11.9%. Recommendation is to be on a statin for primary prevention. Although you do not have calcified plaque on the CT calcium score, it does not mean you do not have soft plaque that has not calcified. Statin will greatly reduce cardiovascular risk. Will continue to monitor your cholesterol.

## 2024-04-18 NOTE — TELEPHONE ENCOUNTER
Future Appointments   Date Time Provider Department Center   4/22/2024  8:00 AM Jl Boudreaux MD [facility] 75 [facility]       Patient called and wanted to verify with MD since she has sciatica she was put on a Rx- Methlprednisone for 5 days last pill is Sunday and her appt is on Monday, since it's going on 24 hours, would her appt be okay. Please advise. Thanks.    Patient would like to get an answer today and may be reached at [phone]

## 2024-04-22 ENCOUNTER — OFFICE VISIT (OUTPATIENT)
Dept: ORTHOPEDICS CLINIC | Facility: CLINIC | Age: 74
End: 2024-04-22
Payer: MEDICARE

## 2024-04-22 DIAGNOSIS — M16.11 PRIMARY OSTEOARTHRITIS OF RIGHT HIP: Primary | ICD-10-CM

## 2024-04-22 RX ORDER — TRIAMCINOLONE ACETONIDE 40 MG/ML
40 INJECTION, SUSPENSION INTRA-ARTICULAR; INTRAMUSCULAR ONCE
Status: COMPLETED | OUTPATIENT
Start: 2024-04-22 | End: 2024-04-22

## 2024-04-22 RX ADMIN — TRIAMCINOLONE ACETONIDE 40 MG: 40 INJECTION, SUSPENSION INTRA-ARTICULAR; INTRAMUSCULAR at 08:20:00

## 2024-04-22 NOTE — PROCEDURES
Had relief from the last hip injection.  She has questions about surgery.  Discussed that she does have symptoms from piriformis, sciatic on stenosis.  Advised to monitor for relief of symptoms for diagnostic purposes.    Risks and benefits of hip injection discussed with the patient, with risks including but not limited to pain and swelling at the injection site and/or within the hip joint, infection, elevation in blood pressure and/or glucose levels, facial flushing. After informed consent, the patient's right hip was marked, prepped with topical antiseptic, and locally anesthetized with skin refrigerant followed by injection of 1% lidocaine to create a skin wheal anteriorly at an insertion site a few fingerbreadths lateral to the femoral pulse, along the trajectory of the femoral neck.  Next, the area was re-prepped with topical antiseptic, and ultrasound guidance was performed to direct a 20 gauge spinal needle into the hip joint at the junction of the femoral head and neck, after which a mixture of 1mL 40mg/mL Kenalog, 2mL 1% lidocaine and 2mL 0.5% marcaine was injected while visualizing the fluid under ultrasound.  Confirmatory imaging was saved to the patient's chart.  A band-aid was applied.  The patient tolerated the procedure well.    Jl Boudreaux MD, [facility]  [facility]opedic [facility]  Phone [phone]  Fax [phone]

## 2024-04-24 ENCOUNTER — LAB ENCOUNTER (OUTPATIENT)
Dept: LAB | Age: 74
End: 2024-04-24
Attending: INTERNAL MEDICINE
Payer: MEDICARE

## 2024-04-24 DIAGNOSIS — R06.09 DYSPNEA ON EXERTION: Primary | ICD-10-CM

## 2024-04-24 DIAGNOSIS — I10 ESSENTIAL HYPERTENSION, MALIGNANT: ICD-10-CM

## 2024-04-24 DIAGNOSIS — E78.5 HYPERLIPIDEMIA: ICD-10-CM

## 2024-04-24 DIAGNOSIS — R73.9 BLOOD GLUCOSE ELEVATED: ICD-10-CM

## 2024-04-24 LAB
ANION GAP SERPL CALC-SCNC: 4 MMOL/L (ref 0–18)
BUN BLD-MCNC: 31 MG/DL (ref 9–23)
CALCIUM BLD-MCNC: 9.6 MG/DL (ref 8.5–10.1)
CHLORIDE SERPL-SCNC: 108 MMOL/L (ref 98–112)
CO2 SERPL-SCNC: 29 MMOL/L (ref 21–32)
CREAT BLD-MCNC: 1.09 MG/DL
EGFRCR SERPLBLD CKD-EPI 2021: 54 ML/MIN/1.73M2 (ref 60–?)
FASTING STATUS PATIENT QL REPORTED: NO
GLUCOSE BLD-MCNC: 102 MG/DL (ref 70–99)
OSMOLALITY SERPL CALC.SUM OF ELEC: 299 MOSM/KG (ref 275–295)
POTASSIUM SERPL-SCNC: 3.8 MMOL/L (ref 3.5–5.1)
SODIUM SERPL-SCNC: 141 MMOL/L (ref 136–145)

## 2024-04-24 PROCEDURE — 36415 COLL VENOUS BLD VENIPUNCTURE: CPT

## 2024-04-24 PROCEDURE — 80048 BASIC METABOLIC PNL TOTAL CA: CPT

## 2024-05-08 NOTE — PROGRESS NOTES
SPINE EVALUATION:     Diagnosis:   Piriformis syndrome of right side (G57.01)  Right sided sciatica (M54.31)      Referring Provider: Bailey Gibbs  Date of Evaluation:    5/8/2024    Precautions:  Cancer hx Next MD visit:   Hip ortho as needed   June 17 ortho/spine at [facility]  Date of Surgery: n/a     PATIENT SUMMARY   Vianney Daniel is a 73 year old female who presents to therapy today with complaints of R sided LBP and LE pain worsening since Jan/Feb.  \"Vice \" into R leg/calf.  No specific injury; increased activity over the months assisting family, travel, garage sale over the past 2 weeks.  Reports doing well after PT last year. Had returned to modified exercise class March.  Reports cardiology follow up and further testing; terrible fatigue with the medication, exhausted.    Has followed up with PCP; dose pack and almost painfree for 1 day then pain returned. Taking Tylenol, votaren currently.  Hx of L sciatica as well, \"now is good\" pain all on the R.  Notes balance is off. Doing home exercises 45' 2x/day; notes exercises help.     Recent R hip injection 4/22/24 helpful.  Spinal stenosis dx 2006.  2023 Xrays, MRI show diffuse degenerative changes lumbar spine, hip OA L4-5 stenosis.   Pt describes pain level current 6/10, at best 2/10, at worst 7/10.   Neuropathy into R foot, pins and needles constant.   Current functional limitations include pain/difficulty walking/standing, interrupted sleep. Walking 1 block max, with pain. Struggles to get off the floor.    Vianney describes prior level of function independent.  Walking/3 miles, [place]/low impact aerobics. Pt goals include pain relief and better mobility. Be able to walk and stand for longer. .  Past medical history was reviewed with Vianney. Significant findings include HTN, HLD, hypothyroidism, prediabetes, Obesity, CKD stage 3a, JAMES, hx of breast cancer s/p chemo in remission  Pt denies diplopia, dysarthria, dysphasia, dizziness, drop  attacks, bowel/bladder changes, saddle anesthesia, and PAULINA LE N/T.    ASSESSMENT  Vianney presents to physical therapy evaluation with primary c/o LBP R LE pain worsening since Jan. The results of the objective tests and measures show antalgic gait, decreased strength/flexibility.  SLR is (-). (+) tenderness multiple muscle groups.  .  Functional deficits include but are not limited to pain/difficulty with walking/standing tasks, interrupted sleep.  Signs and symptoms are consistent with diagnosis of spinal stenosis, radiculopathy, hip OA and sciatica. Pt and PT discussed evaluation findings, pathology, POC and HEP.  Pt voiced understanding and performs HEP correctly without reported pain. Skilled Physical Therapy is medically necessary to address the above impairments and reach functional goals.     OBJECTIVE:   Observation/Posture: increased thoracic kyphosis  Gait: antalgic with decreased R stance. (+) trendelenburg  Neuro Screen: Denies changes in bowel/bladder function. Sensation grossly intact to light touch        Lumbar ROM: Hypomobility throughout  % range, relief. Extension limited/painful   Hip Screen: Injections. bilat hip OA, limited bilat IR  Functional:                Single Leg Stance Balance:  limited R with pain/UE support  Accessory Motion: TBA   Palpation: (+)  tenderness multiple muscle groups bilat prox ITB into gluteals, piriformis and LS musculature.        Strength/MMT:         Right  Left   Hip flexion (L1,2,3)  4/5  4/5   Knee Extension (L3, L4)  4+/5  4+/5   Ankle DF/inversion (L4, L5)  5/5  5/5   Great Toe Extension (L5)  4/5  4/5   Ankle Eversion (S1)  5/5  5/5    glut med     R 2+/5 *pain  Glut max R 2+/5 *pain  Flexibility: hams 90/90 R -10  L -10  Special Tests:                SLR: R (-)  L (-)  Today’s Treatment and Response:   Pt education was provided on exam findings, treatment diagnosis, treatment plan, expectations, and prognosis. Pt was also provided recommendations for  activity modifications, postural corrections, and pain science education. NS sensitization, benefits of exercise, stress management, and relaxation techniques, pacing principles  Patient was instructed in and issued a HEP for: pt brought in prior HEP.  Modifications STS modified range.  Pelvic tilt, DKTC for pain relief.  Bridge modified range/glute set for improved tolerance.   Charges: PT Eval Moderate Complexity, TE      Total Timed Treatment: 10 min     Total Treatment Time: 45 min     PLAN OF CARE:    Goals: (to be met in 10 visits)   (I) with HEP  Demonstrate proper body mechanics with functional squat and without c/o pain.  Improved lumbar flexion to enable don/doff socks and shoes without complaints.   Able to tolerate activities in stand/walk 60 mins with decreased pain to 4/10 levels max.  Able to sleep uninterrupted from pain.   Radicular symptoms at least 80% improvement with all ADL's.    Frequency / Duration: Patient will be seen for 1-2 x/week or a total of 10 visits over a 90 day period. Treatment will include: Gait training, Manual Therapy, Neuromuscular Re-education, Therapeutic Activities, Therapeutic Exercise, and Home Exercise Program instruction    Education or treatment limitation: None  Rehab Potential:good    Patient/Family/Caregiver was advised of these findings, precautions, and treatment options and has agreed to actively participate in planning and for this course of care.    Thank you for your referral. Please co-sign or sign and return this letter via fax as soon as possible to [phone]. If you have any questions, please contact me at Dept: [phone]    Sincerely,  Electronically signed by therapist: Keiko Tejeda PT    Physician's certification required: Yes  I certify the need for these services furnished under this plan of treatment and while under my care.    X___________________________________________________ Date____________________    Certification From: 5/8/2024   To:8/6/2024

## 2024-05-09 ENCOUNTER — OFFICE VISIT (OUTPATIENT)
Dept: PHYSICAL THERAPY | Age: 74
End: 2024-05-09
Attending: STUDENT IN AN ORGANIZED HEALTH CARE EDUCATION/TRAINING PROGRAM
Payer: MEDICARE

## 2024-05-09 DIAGNOSIS — M54.31 RIGHT SIDED SCIATICA: ICD-10-CM

## 2024-05-09 DIAGNOSIS — G57.01 PIRIFORMIS SYNDROME OF RIGHT SIDE: Primary | ICD-10-CM

## 2024-05-09 PROCEDURE — 97162 PT EVAL MOD COMPLEX 30 MIN: CPT

## 2024-05-09 PROCEDURE — 97110 THERAPEUTIC EXERCISES: CPT

## 2024-05-13 NOTE — PROGRESS NOTES
Diagnosis:   Piriformis syndrome of right side (G57.01)  Right sided sciatica (M54.31)      Referring Provider: Bailey Gibbs  Date of Evaluation:    5/9/24    Precautions:  PMHx cancer Next MD visit:   none scheduled  Date of Surgery: n/a   Insurance Primary/Secondary: MEDICARE / AARP     # Auth Visits: 10 POC            Subjective: Very difficult sleeping due to the leg pain.  Walking is painful as well.  ~4 hours/ sleep per night.   Taking Tylenol before bed, working on sleep regimen so she doesn't need the sleeping pills as much.    Pain: 6/10, at best 2/10, at worst 7/10      Objective:   (-) SLR  (+) tenderness multiple muscle groups, hip rotators, ITB throughout including insertion at patella    Assessment: Persistent R LE pain affecting function, sleep. Limited tolerance for exercise in WB; focus more on mat exercises. Tolerated today's exercises well; relief with LE distraction.  ITB exquisite tenderness. Discussed pillow positioning, exercise, use of ice.     Goals:   (to be met in 10 visits)   (I) with HEP  Demonstrate proper body mechanics with functional squat and without c/o pain.  Improved lumbar flexion to enable don/doff socks and shoes without complaints.   Able to tolerate activities in stand/walk 60 mins with decreased pain to 4/10 levels max.  Able to sleep uninterrupted from pain.   Radicular symptoms at least 80% improvement with all ADL's.    Plan: Patient will be seen for 1-2 x/week or a total of 10 visits over a 90 day period. Treatment will include: Gait training, Manual Therapy, Neuromuscular Re-education, Therapeutic Activities, Therapeutic Exercise, and Home Exercise Program instruction     Date: 5/13/2024  TX#: 2/10 Date:                 TX#: 3/ Date:                 TX#: 4/ Date:                 TX#: 5/ Date:   Tx#: 6/   TE 30'  HEP review  Nustep 5' L3  DLHR 10x2       Gastroc stretch 2x each       Pelvic tilt 10x  SKTC 3x       TrAb control :  BKFO 10x2  Low level sciatic nerve glides  with bolster 10x  Manual 15'  R LE 10\"x5  Gentle STM ITB         HEP: STS modified range. Pelvic tilt, DKTC for pain relief. Bridge modified range/glute set    Charges: TEx2 man x1       Total Timed Treatment: 50 min  Total Treatment Time: 50 min

## 2024-05-14 ENCOUNTER — OFFICE VISIT (OUTPATIENT)
Dept: PHYSICAL THERAPY | Age: 74
End: 2024-05-14
Attending: STUDENT IN AN ORGANIZED HEALTH CARE EDUCATION/TRAINING PROGRAM

## 2024-05-14 PROCEDURE — 97140 MANUAL THERAPY 1/> REGIONS: CPT

## 2024-05-14 PROCEDURE — 97110 THERAPEUTIC EXERCISES: CPT

## 2024-05-16 ENCOUNTER — OFFICE VISIT (OUTPATIENT)
Dept: PHYSICAL THERAPY | Age: 74
End: 2024-05-16
Attending: STUDENT IN AN ORGANIZED HEALTH CARE EDUCATION/TRAINING PROGRAM

## 2024-05-16 PROCEDURE — 97110 THERAPEUTIC EXERCISES: CPT

## 2024-05-16 PROCEDURE — 97140 MANUAL THERAPY 1/> REGIONS: CPT

## 2024-05-16 PROCEDURE — 97112 NEUROMUSCULAR REEDUCATION: CPT

## 2024-05-16 NOTE — PROGRESS NOTES
Diagnosis:   Piriformis syndrome of right side (G57.01)  Right sided sciatica (M54.31)      Referring Provider: Bailey Gibbs  Date of Evaluation:    5/9/24    Precautions:  PMHx cancer Next MD visit:   none scheduled  Date of Surgery: n/a   Insurance Primary/Secondary: MEDICARE / AARP     # Auth Visits: 10 POC            Subjective:   Pain is better than last visit.  Walking a bit better.   R leg pain bothersome at night all the way down the leg, numbness into foot.   Working on pillow support at night.      Pain: 6/10, at best 2/10, at worst 7/10  R side only.  R piriformis into hip, throughout ITB, lateral knee. N/T foot.     Objective:   Antalgic gait, decreased R stance  (+) pain bed mobility  Gait taking shorter strides with some relief  (-) SLR  (+) tenderness multiple muscle groups, hip rotators, ITB throughout including insertion at patella    Assessment: R ITB irritation throughout; cold pack today post session. High levels of pain; modification of home exercises to keep in more manageable range.   Bridge remains limited by pain.  Emphasis on activity modification; not to overdo which pt continues to try to work into her lifestyle. She has made some helpful changes.      Goals:   (to be met in 10 visits)   (I) with HEP  Demonstrate proper body mechanics with functional squat and without c/o pain.  Improved lumbar flexion to enable don/doff socks and shoes without complaints.   Able to tolerate activities in stand/walk 60 mins with decreased pain to 4/10 levels max.  Able to sleep uninterrupted from pain.   Radicular symptoms at least 80% improvement with all ADL's.    Plan: Pt out of town over the next month, then plan to resume.  Discussed exercise over the month, gradual resume bridge as fannie, trial cold pack 3 days 2-3x/day, 10-15' and note benefit.  Patient will be seen for 1-2 x/week or a total of 10 visits over a 90 day period. Treatment will include: Gait training, Manual Therapy, Neuromuscular  Re-education, Therapeutic Activities, Therapeutic Exercise, and Home Exercise Program instruction     Date: 5/13/2024  TX#: 2/10 Date:   5/16/24              TX#: 3/10 Date:                 TX#: 4/ Date:                 TX#: 5/ Date:   Tx#: 6/   TE 30'  HEP review  Nustep 5' L3  DLHR 10x2 TE  Nustep 5'  DLHR partial range 10x2  Gastroc stretch 3x        Gastroc stretch 2x each NR       Pelvic tilt 10x  SKTC 3x Pelvic tilt 10x  Pt ed NS sensitization, pacing principles, exercise effect        TrAb control :  BKFO 10x2  Low level sciatic nerve glides with bolster 10x  Manual 15'  R LE distraction 10\"x5  Gentle STM ITB   TrAb control :  BKFO 10x decreased range  Low level sciatic nerve glides with TKE bolster 10x  Manual 10'  Gentle STM ITB, post hip  Piriformis manual stretch/STM      HEP: STS modified range. Pelvic tilt, DKTC for pain relief. Bridge modified range/glute set    Charges: TEx1 15'   NR x 1 15'   man x1 10'     Total Timed Treatment: 40 min+ CP   Total Treatment Time: 50 min

## 2024-05-31 DIAGNOSIS — E03.9 ACQUIRED HYPOTHYROIDISM: ICD-10-CM

## 2024-05-31 RX ORDER — LEVOTHYROXINE SODIUM 75 UG/1
75 TABLET ORAL
Qty: 90 TABLET | Refills: 0 | Status: SHIPPED | OUTPATIENT
Start: 2024-05-31 | End: 2024-06-24

## 2024-05-31 NOTE — TELEPHONE ENCOUNTER
Thyroid Medication Protocol [id number]/31/2024 05:56 AM   Protocol Details TSH in past 12 months    Last TSH value is normal    In person appointment or virtual visit in the past 12 mos or appointment in next 3 mos      //Acquired hypothyroidism  PLAN:   TSH at goal as of 10/2023.   -Continue levothyroxine 75mcg qAM daily.       Return in about 6 months (around 6/8/2024) for Medication follow-up.   Future Appointments   Date Time Provider Department Center   6/18/2024  3:15 PM Keiko Tejeda, PT SNPT [facility]   6/20/2024  3:15 PM Keiko Tejeda, PT SNPT [facility]   6/24/2024  3:00 PM Bailey Gibbs MD EMG 29 [facility] [place]   6/25/2024  3:15 PM Keiko Tejeda, PT SNPT [facility]   6/26/2024  1:30 PM PF [facility] PF OUTPT LAB [place]   6/26/2024  1:45 PM PF [facility] PF OUTPT LAB [place]   6/26/2024  2:00 PM PF CARD STRESS ECHO RM 1 PF CARD [place]   6/27/2024  3:15 PM Keiko Tejeda, PT SNPT [facility]   7/2/2024  3:15 PM Keiko Tejeda, PT SNPT [facility]   7/8/2024  9:00 AM PF US RM2 PF US [place]   7/11/2024 10:15 AM Lara Quiles MD EMGGENSURNAP [id number]      Rx refilled per protocol.

## 2024-06-11 ENCOUNTER — APPOINTMENT (OUTPATIENT)
Dept: PHYSICAL THERAPY | Age: 74
End: 2024-06-11
Attending: STUDENT IN AN ORGANIZED HEALTH CARE EDUCATION/TRAINING PROGRAM
Payer: MEDICARE

## 2024-06-13 ENCOUNTER — APPOINTMENT (OUTPATIENT)
Dept: PHYSICAL THERAPY | Age: 74
End: 2024-06-13
Attending: STUDENT IN AN ORGANIZED HEALTH CARE EDUCATION/TRAINING PROGRAM
Payer: MEDICARE

## 2024-06-18 ENCOUNTER — OFFICE VISIT (OUTPATIENT)
Dept: PHYSICAL THERAPY | Age: 74
End: 2024-06-18
Attending: STUDENT IN AN ORGANIZED HEALTH CARE EDUCATION/TRAINING PROGRAM
Payer: MEDICARE

## 2024-06-18 DIAGNOSIS — F51.01 PRIMARY INSOMNIA: ICD-10-CM

## 2024-06-18 PROCEDURE — 97110 THERAPEUTIC EXERCISES: CPT

## 2024-06-18 PROCEDURE — 97140 MANUAL THERAPY 1/> REGIONS: CPT

## 2024-06-18 NOTE — PROGRESS NOTES
Diagnosis:   Piriformis syndrome of right side (G57.01)  Right sided sciatica (M54.31)      Referring Provider: Bailey Gibbs  Date of Evaluation:    5/9/24    Precautions:  PMHx cancer Next MD visit:   none scheduled  Date of Surgery: n/a   Insurance Primary/Secondary: MEDICARE / AARP     # Auth Visits: 10 POC            Subjective:   has been away in [place], lots of stairs.  Standing/walking and cooking/baking.     Saw a new orthopedic, taking gabapentin low dose, extra strength tylenol, lidocaine patch. Pleased to get 7 hours sleep with the patch.  Reports [place] allergy; limitations regarding joint replacement options.    Pain: 6-7/10/10  R side only.  R piriformis into hip, throughout ITB, lateral knee. N/T foot.     Objective:   Lower abs 2/5  (+) trendelenburg  Antalgic gait, decreased R stance  (-) SLR  (+) tenderness multiple muscle groups, hip rotators, ITB throughout including insertion at patella    Assessment: Pt returns from trip out of country for the past few weeks. Aggravation of pain, at least partially attributed to excessive stairs.  Pt is mindful of pacing principles and is trying to incorporate into day to day.   Discussed sleep hygiene principles; issued handout.   Able to progress clam exercises; cues for increased recruitment of glut med.   Pt has followed up recently with ortho for rx including R hip bursitis, initial relief noted.     Goals:   (to be met in 10 visits)   (I) with HEP  Demonstrate proper body mechanics with functional squat and without c/o pain.  Improved lumbar flexion to enable don/doff socks and shoes without complaints.   Able to tolerate activities in stand/walk 60 mins with decreased pain to 4/10 levels max.  Able to sleep uninterrupted from pain.   Radicular symptoms at least 80% improvement with all ADL's.    Plan: Clam with progression to glut med sidelying over the next week. ITB STM.       Date: 5/13/2024  TX#: 2/10 Date:   5/16/24              TX#: 3/10 Date:   6/18/24               TX#: 4/10 Date:                 TX#: 5/ Date:   Tx#: 6/ TE 30'  HEP review  Nustep 5' L3  DLHR 10x2 TE  Nustep 5'  DLHR partial range 10x2  Gastroc stretch 3x   TE  Pelvic tilt 5x  Reassess  HEP modifications  -bridge decreased range, glute set emphasis  -clam with incr hip/knee ext  -low level S/L hip abd/glut med progress     Gastroc stretch 2x each NR  BKFO 10x3 each     Pelvic tilt 10x  SKTC 3x Pelvic tilt 10x  Pt ed NS sensitization, pacing principles, exercise effect        TrAb control :  BKFO 10x2  Low level sciatic nerve glides with bolster 10x  Manual 15'  R LE distraction 10\"x5  Gentle STM ITB   TrAb control :  BKFO 10x decreased range  Low level sciatic nerve glides with TKE bolster 10x  Manual 10'  Gentle STM ITB, post hip  Piriformis manual stretch/STM      HEP: STS modified range. Pelvic tilt, DKTC for pain relief. Bridge modified range/glute set    Charges: TEx2 35'  man x1 10'     Total Timed Treatment: 45  Total Treatment Time: 45 min

## 2024-06-19 RX ORDER — FLUTICASONE PROPIONATE 50 MCG
1 SPRAY, SUSPENSION (ML) NASAL 2 TIMES DAILY
Qty: 48 G | Refills: 0 | Status: SHIPPED | OUTPATIENT
Start: 2024-06-19 | End: 2024-06-24

## 2024-06-19 NOTE — TELEPHONE ENCOUNTER
Allergy Medication Protocol [id number]/18/2024 12:53 PM   Protocol Details In person appointment or virtual visit in the past 12 mos or appointment in next 3 mos       Last OV relevant to medication: 4/15/24  Last refill date: 3/5/24    30  #/refills: 0  When pt was asked to return for OV:   Upcoming appt/reason: 6/24/24  Was pt informed of any over due labs: n/a   Lab Results   Component Value Date     (H) 04/24/2024    BUN 31 (H) 04/24/2024    BUNCREA 25.3 (H) 10/12/2020    CREATSERUM 1.09 (H) 04/24/2024    ANIONGAP 4 04/24/2024    GFR 55 (L) 08/21/2017    GFRNAA 72 02/07/2022    GFRAA 83 02/07/2022    CA 9.6 04/24/2024    OSMOCALC 299 (H) 04/24/2024    ALKPHO 54 (L) 02/26/2024    AST 17 02/26/2024    ALT 29 02/26/2024    BILT 0.4 02/26/2024    TP 6.9 02/26/2024    ALB 3.6 02/26/2024    GLOBULIN 3.3 02/26/2024     04/24/2024    K 3.8 04/24/2024     04/24/2024    CO2 29.0 04/24/2024         Future Appointments   Date Time Provider Department Center   6/20/2024  3:15 PM Keiko Tejeda, PT SNPT [facility]   6/24/2024  3:00 PM Bailey Gibbs MD EMG 29 [facility] [place]

## 2024-06-20 ENCOUNTER — OFFICE VISIT (OUTPATIENT)
Dept: PHYSICAL THERAPY | Age: 74
End: 2024-06-20
Attending: STUDENT IN AN ORGANIZED HEALTH CARE EDUCATION/TRAINING PROGRAM
Payer: MEDICARE

## 2024-06-20 PROCEDURE — 97110 THERAPEUTIC EXERCISES: CPT

## 2024-06-20 PROCEDURE — 97112 NEUROMUSCULAR REEDUCATION: CPT

## 2024-06-20 RX ORDER — ESZOPICLONE 1 MG/1
1 TABLET, FILM COATED ORAL NIGHTLY PRN
Qty: 30 TABLET | Refills: 0 | Status: SHIPPED | OUTPATIENT
Start: 2024-06-20 | End: 2024-09-18

## 2024-06-20 NOTE — PROGRESS NOTES
Diagnosis:   Piriformis syndrome of right side (G57.01)  Right sided sciatica (M54.31)      Referring Provider: Bailey Gibbs  Date of Evaluation:    5/9/24    Precautions:  PMHx cancer Next MD visit:   none scheduled  Date of Surgery: n/a   Insurance Primary/Secondary: MEDICARE / AARP     # Auth Visits: 10 POC            Subjective:  reports the gabapentin and lidocaine patches seem to really be helping.     Pain: 6-7/10  R side only.  R piriformis into hip, throughout ITB, lateral knee. N/T foot.     Objective:   Lower abs 2/5  (+) trendelenburg  Antalgic gait, decreased R stance  (-) SLR  (+) tenderness multiple muscle groups, hip rotators, ITB throughout including insertion at patella    Assessment: Quad inhibition as well as hip pain limiting SLR; modified to seated. Also promoting STS with increased use of quads, hip hinge, as fannie.   Quad have become weak over them months with pain; functionally pt continues to have extreme difficulty getting off the floor.     Goals:   (to be met in 10 visits)   (I) with HEP  Demonstrate proper body mechanics with functional squat and without c/o pain.  Improved lumbar flexion to enable don/doff socks and shoes without complaints.   Able to tolerate activities in stand/walk 60 mins with decreased pain to 4/10 levels max.  Able to sleep uninterrupted from pain.   Radicular symptoms at least 80% improvement with all ADL's.    Plan: STS, shuttle.   Clam with progression to glut med sidelying over the next week. ITB STM.       Date: 5/13/2024  TX#: 2/10 Date:   5/16/24              TX#: 3/10 Date:  6/18/24               TX#: 4/10 Date: 6/20/24                TX#: 5/ Date:   Tx#: 6/   TE 30'  HEP review  Nustep 5' L3  DLHR 10x2 TE  Nustep 5'  DLHR partial range 10x2  Gastroc stretch 3x   TE  Pelvic tilt 5x  Reassess  HEP modifications  -bridge decreased range, glute set emphasis  -clam with incr hip/knee ext  -low level S/L hip abd/glut med progress TE  reassess  LTR 10x  Cross leg  piriformis - reviewed options for home  HEP review - sciatic nerve glides progression    NR  TrAb BKFO 20x L/ 20x R   [place] L 1 10x each  STS elevated table train hip hinge  Seated SLR modification - HEP    Gastroc stretch 2x each NR  BKFO 10x3 each     Pelvic tilt 10x  SKTC 3x Pelvic tilt 10x  Pt ed NS sensitization, pacing principles, exercise effect        TrAb control :  BKFO 10x2  Low level sciatic nerve glides with bolster 10x  Manual 15'  R LE distraction 10\"x5  Gentle STM ITB   TrAb control :  BKFO 10x decreased range  Low level sciatic nerve glides with TKE bolster 10x  Manual 10'  Gentle STM ITB, post hip  Piriformis manual stretch/STM      HEP: STS modified range. Pelvic tilt, DKTC for pain relief. Bridge modified range/glute set.  Sciatic nerve glides, seated SLR    Charges: TEx2 35'  NR x1 10'     Total Timed Treatment: 45  Total Treatment Time: 45 min

## 2024-06-24 ENCOUNTER — OFFICE VISIT (OUTPATIENT)
Dept: INTERNAL MEDICINE CLINIC | Facility: CLINIC | Age: 74
End: 2024-06-24

## 2024-06-24 VITALS
WEIGHT: 179.63 LBS | HEART RATE: 74 BPM | HEIGHT: 62 IN | DIASTOLIC BLOOD PRESSURE: 76 MMHG | BODY MASS INDEX: 33.06 KG/M2 | OXYGEN SATURATION: 98 % | RESPIRATION RATE: 16 BRPM | SYSTOLIC BLOOD PRESSURE: 122 MMHG | TEMPERATURE: 98 F

## 2024-06-24 DIAGNOSIS — F51.01 PRIMARY INSOMNIA: ICD-10-CM

## 2024-06-24 DIAGNOSIS — E03.9 ACQUIRED HYPOTHYROIDISM: ICD-10-CM

## 2024-06-24 PROCEDURE — 99214 OFFICE O/P EST MOD 30 MIN: CPT | Performed by: STUDENT IN AN ORGANIZED HEALTH CARE EDUCATION/TRAINING PROGRAM

## 2024-06-24 RX ORDER — FLUTICASONE PROPIONATE 50 MCG
1 SPRAY, SUSPENSION (ML) NASAL 2 TIMES DAILY
Qty: 48 G | Refills: 3 | Status: SHIPPED | OUTPATIENT
Start: 2024-06-24

## 2024-06-24 RX ORDER — GABAPENTIN 300 MG/1
300 CAPSULE ORAL NIGHTLY
COMMUNITY
Start: 2024-06-17

## 2024-06-24 RX ORDER — LIDOCAINE 50 MG/G
1 PATCH TOPICAL EVERY 24 HOURS
COMMUNITY
Start: 2024-06-17

## 2024-06-24 RX ORDER — ASPIRIN 81 MG/1
81 TABLET ORAL DAILY
COMMUNITY

## 2024-06-24 RX ORDER — LEVOTHYROXINE SODIUM 0.07 MG/1
75 TABLET ORAL
Qty: 90 TABLET | Refills: 3 | Status: SHIPPED | OUTPATIENT
Start: 2024-06-24

## 2024-06-24 NOTE — PROGRESS NOTES
Chief Complaint   Patient presents with    Medication Follow-Up     6 Month Med Check    Medication Request     Going on a Cruise - requesting Medication for Motion Sickness and Nausea       SUBJECTIVE & A/P:  Vianney Daniel is a 73 year old female with pmhx of HTN, HLD, hypothyroidism, prediabetes, Obesity, CKD stage 3a, JAMES, hx of breast cancer s/p chemo in remission who presents for R leg pain.     //Lumbar radiculopathy   //Primary osteoarthritis of the hip   //Greater trochanter bursitis   //Moderate spinal canal stenosis at L4-L5  //Degenerative disc disease  Sleeping better with gabapentin. Pain at night is getting worse, but initially gabapentin has really helped. Will get a bursitis injection in July. Has had intraarticular joint injections x 6 without benefit. PT has been helpful. Steroids helps the first day.   PLAN:   MRI lumbar spine 2023 showed moderage DJD of the lumbar spine at multi levels and moderate spinal canal stenosis at L4-L5. Reflexes intact on exam, denies bowel or bladder incontinence. Straight leg positive on R and symptoms consistent with sciatica flare. Exam also concerning for piriformis syndrome as a potential etiology. Treat with steroids and PT.   -PT consulted appreciate recs  -Recommend patient to follow-up with ortho if symptoms do not resolve for potential epidural injection as previously discussed.   Consider hip bursal injection for her. She has had multiple intra-articular hip injections through her outside care provider.  Consider lumbar ALOK based on lumbar MRI imaging.  Start gabapentin 300 mg QHS. Common side effect profile was reviewed with the patient.  Lidoderm patch 12 hours on and 12 hours off. Watch for adhesive sensitivity.  Tylenol arthritis 650 mg TID. Max dose of tylenol is 3500 mg/day     //R leg contraction knots  Patient reports small knots that she can feel in her muscles on her leg. Concerned if this is something she should be worried about. Can be  uncomfortable when pressed on, but otherwise no pain.   PLAN:   Exam notable for presence only with palpation during contraction of the quadricep muscle. Notable above the knee about 1cm in size and additional one located laterally on the side of the thigh as well. Some tenderness noted with palpation. Coincides with flare of sciatica. CTM. If increase in size or more visible, would consider ultrasound of the area to evaluate further. Ddx less concerning for vein pathology, mass. Most recent vein ultrasound negative per review.      //Hypercholesterolemia  PLAN:    6/2023. CAC score of 0 8/2022. Diet controlled. Should be on a statin due to elevated ASCVD risk. Patient refuses at this time. Did discuss CAC score does not show new plaque or soft plaque. Only shows old calcified plaques.   The 10-year ASCVD risk score (Aimee MANE, et al., 2019) is: 12.3%    Values used to calculate the score:      Age: 73 years      Sex: Female      Is Non- : No      Diabetic: No      Tobacco smoker: No      Systolic Blood Pressure: 122 mmHg      Is BP treated: Yes      HDL Cholesterol: 62 mg/dL      Total Cholesterol: 183 mg/dL    //Chronic heart failure with preserved ejection fraction (HCC)  Expresses concern that increase in entresto has been causing a decrease in her energy and is afraid of increasing dose further as recommended by Dr. Tapia. Plan is to repeat echo in 3 months to reevaluate GLS and EF and if further decreasing may need to discuss dose up titration.   PLAN:   Most recent echo with stable GLS, but slight decrease in EF from 55 to 50%.   -Follows with Dr. Tapia. Started on entresto and carvedilol. Follow-up echo in 3 months.     //Primary hypertension  PLAN:   At goal. Jardiance and maybe uptitrate carvedilol?   -Entresto 49-51mg BID.   -Carvedilol 3.125mg BID.     //Acquired hypothyroidism  PLAN:   TSH at goal as of 10/2023.   -Continue levothyroxine 75mcg qAM daily.   -repeat  TSH ordered.     //Class 1 obesity due to excess calories with serious comorbidity and body mass index (BMI) of 33.0 to 33.9 in adult  Exercising regularly in classes. Monitoring diet.   PLAN:   CTM     //Prediabetes  PLAN:   A1c 5.8%. Stable, CTM     //Stage 3a chronic kidney disease (HCC)  PLAN:   Stable, CTM.      //JAMES (obstructive sleep apnea)  PLAN:  Mild, positional. CTM not on CPAP.      //Primary insomnia  PLAN:   -Continue eszopiclone 1mg at bedtime.     //Arthritis of right hip  PLAN:   Hip XR 2023 with evidence of mild osteoarthritic changes in both femoral acetabular joints R>L.  -Following with ortho for joint injections and PT.     //Patient report of mild COPD  Denies shortness of breath, chronic cough, chest pressure.   PLAN:  2014 PFTs:With no evidenec of airway obstruction, Restriction noted, but total lung capacity wnls. Diffusion capacity is mild decreased but correct into the normal range when alveolar lung volumes are taken into account. Discussed with patient that this does not show evidence of COPD and as she is asymptomatic no further concerns at this time.     HEALTH MAINTENANCE:   -Vaccinations: Prevnar complete, Shingrix done, Flu done, COVID done  -Colonoscopy: 03/16/2018  -Mammogram: 07/05/2023  -Pap Smear: Tested out.   -Diabetes screening: Last A1c value was 5.8% done 6/5/2023.  -Lipid screening: Cholesterol: 183, done on 6/5/2023.  HDL Cholesterol: 62, done on 6/5/2023.  TriGlycerides 90, done on 6/5/2023.  LDL Cholesterol: 105, done on 6/5/2023.   -Birth Control: post menopausal   -Smoking: none  -Alcohol: rare   -Marijuana: none  -Recreational drug: none    Return in about 5 months (around 12/1/2024) for AWV.    Bailey Gibbs MD  Internal Medicine     Past Medical History:   Past Medical History:    Acquired hypothyroidism    Arthritis    Seeing a Chiroprator for ankles knees and hips especially ri    Arthritis of right hip    Basal cell carcinoma (BCC) of right upper arm    Breast  cancer (HCC)    right breast    Cancer (HCC)    CKD (chronic kidney disease) stage 3, GFR 30-59 ml/min (HCC)    Colon polyp, hyperplastic    Colon polyp, hyperplastic    COPD (chronic obstructive pulmonary disease) (HCC)    Essential hypertension    Heart disease    WEAKEND HEART    High blood pressure    History of adenomatous polyp of colon    History of squamous cell carcinoma    Left cheek, 2018    HTN (hypertension)    Hypothyroidism    Obesity    Obstructive sleep apnea (adult) (pediatric)    AHI 15 supine AHI 22 non-supien AHI 8 SaO2 faith 68 %    Onychomycosis    JAMES on CPAP    Osteoarthritis    Periorbital swelling    nickel allergy    Personal history of antineoplastic chemotherapy    PONV (postoperative nausea and vomiting)    nausea    Prediabetes    Rosacea    Sleep apnea    NO CPAP USE AT THIS TIME    Spinal stenosis of lumbar region at multiple levels    Subclinical hypothyroidism    Swelling of both ankles    UTI (urinary tract infection)    Venous insufficiency    Visual impairment    glasses        Past Surgical History:   Past Surgical History:   Procedure Laterality Date    Arthroscopy of joint unlisted      Biopsy of breast, needle core      x3  benign    Breast reconstruction Bilateral 2/23/15    with (FEMI)abdominal free flap    Colonoscopy  age 57 yrs    Colonoscopy      Colonoscopy N/A 3/16/2018    Procedure: COLONOSCOPY, POSSIBLE BIOPSY, POSSIBLE POLYPECTOMY 82203;  Surgeon: Sonja Cedillo MD;  [place] [facility] [place] [facility]    D & c      Mastectomy left      Mastectomy right      Mri breast biopsy right Right 1/13/15    Needle biopsy left      Needle biopsy right            Other Bilateral 2/23/15    mastectomies      Other  2022    LEFT FRONTAL NEEDLE BIOPSY OF CALVARIAL LESION WITH NEURO NAVIGATIONLeftGeneral    Other surgical history  03/16/15    power port    Other surgical history  2015    Bilateral mastectomy    Other surgical history  2018     excision skin left cheek- actinic keratosis    Shoulder surg proc unlisted Left 1990    removal of benign lump    Skin surgery      Tonsillectomy      Us breast biopsy Right 12/10/14       Current Medications:    Current Outpatient Medications   Medication Sig Dispense Refill    aspirin 81 MG Oral Tab EC Take 1 tablet (81 mg total) by mouth daily.      gabapentin 300 MG Oral Cap Take 1 capsule (300 mg total) by mouth nightly.      lidocaine 5 % External Patch Place 1 patch onto the skin daily.      levothyroxine 75 MCG Oral Tab Take 1 tablet (75 mcg total) by mouth before breakfast. 90 tablet 3    fluticasone propionate 50 MCG/ACT Nasal Suspension 1 spray by Nasal route 2 (two) times daily. 48 g 3    ESZOPICLONE 1 MG Oral Tab TAKE 1 TABLET(1 MG) BY MOUTH EVERY NIGHT AS NEEDED 30 tablet 0    ENTRESTO 49-51 MG Oral Tab Take 1 tablet by mouth 2 (two) times daily.      riboflavin 100 MG Oral Tab Vitamin B-2  100 mg tablet, [RxNorm: 685023]      carvedilol 3.125 MG Oral Tab Take 1 tablet (3.125 mg total) by mouth 2 (two) times daily with meals.      Pseudoephedrine-Acetaminophen (SM NON-ASPRIN SINUS OR) LOW ASPRIN      Ginkgo Biloba (GINKOBA OR) Take 240 mg by mouth every morning.      Omega 3-6-9 Fatty Acids (OMEGA 3-6-9 COMPLEX) Oral Cap Take 1 capsule by mouth daily.      Tart Molina 1200 MG Oral Cap Take 1 capsule by mouth as needed.      Magnesium 500 MG Oral Tab Take 1 tablet (500 mg total) by mouth daily.      Cholecalciferol (VITAMIN D3) 5000 UNITS Oral Cap Take 1 capsule (5,000 Units total) by mouth daily.      Calcium Carb-Cholecalciferol 500-600 MG-UNIT Oral Tab Take 1 tablet by mouth daily.        Biotin 5000 MCG Oral Tab Take 1 tablet (5 mg total) by mouth daily.      Vitamin B-12 500 MCG Oral Tab Take 1 tablet (500 mcg total) by mouth daily.      Pyridoxine HCl (VITAMIN B-6) 100 MG Oral Tab Take 1 tablet (100 mg total) by mouth daily.      vitamin E 400 UNITS Oral Cap Take 1 capsule (400 Units total) by  mouth daily.      Chromium-Cinnamon (CINNAMON PLUS CHROMIUM OR) Take 2,000 mg by mouth 2 (two) times a day.      Cranberry 500 MG Oral Cap Take 1 capsule by mouth 2 (two) times daily.        Lutein-Zeaxanthin 25-5 MG Oral Cap Take 1 capsule by mouth daily.      Ferrous Sulfate 325 (65 FE) MG Oral Tab Take 0.2 tablets (65 mg total) by mouth daily.      Vitamin C (VITAMIN C) 500 MG Oral Tab Take 1 tablet (500 mg total) by mouth daily.         PHYSICAL EXAM:   /76 (BP Location: Left arm, Patient Position: Sitting, Cuff Size: large)   Pulse 74   Temp 97.6 °F (36.4 °C) (Temporal)   Resp 16   Ht 5' 2\" (1.575 m)   Wt 179 lb 9.6 oz (81.5 kg)   SpO2 98%   BMI 32.85 kg/m²  Body mass index is 32.85 kg/m².   General: No acute distress. Alert and oriented x 3.  HEENT: Ear canals clear. TMs pearly gray bilaterally. Throat without erythema or exudates.  Respiratory: Clear to auscultation bilaterally.  No wheezes, rales, or rhonchi.   Cardiovascular: RRR. No murmurs, rubs, or gallops.   Psychiatric: Appropriate mood and affect.    LABS:   Lab Results   Component Value Date    A1C 5.8 (H) 06/05/2023    A1C 6.0 (H) 06/07/2022      Lab Results   Component Value Date     (H) 04/24/2024    BUN 31 (H) 04/24/2024    BUNCREA 25.3 (H) 10/12/2020    CREATSERUM 1.09 (H) 04/24/2024    ANIONGAP 4 04/24/2024    GFR 55 (L) 08/21/2017    GFRNAA 72 02/07/2022    GFRAA 83 02/07/2022    CA 9.6 04/24/2024    OSMOCALC 299 (H) 04/24/2024    ALKPHO 54 (L) 02/26/2024    AST 17 02/26/2024    ALT 29 02/26/2024    BILT 0.4 02/26/2024    TP 6.9 02/26/2024    ALB 3.6 02/26/2024    GLOBULIN 3.3 02/26/2024     04/24/2024    K 3.8 04/24/2024     04/24/2024    CO2 29.0 04/24/2024      Lab Results   Component Value Date    WBC 6.5 10/18/2023    RBC 4.54 10/18/2023    HGB 13.8 10/18/2023    HCT 42.0 10/18/2023    MCV 92.5 10/18/2023    MCH 30.4 10/18/2023    MCHC 32.9 10/18/2023    RDW 14.3 10/18/2023    .0 10/18/2023         IMAGING:   None to review.

## 2024-06-25 ENCOUNTER — OFFICE VISIT (OUTPATIENT)
Dept: PHYSICAL THERAPY | Age: 74
End: 2024-06-25
Attending: STUDENT IN AN ORGANIZED HEALTH CARE EDUCATION/TRAINING PROGRAM
Payer: MEDICARE

## 2024-06-25 PROCEDURE — 97112 NEUROMUSCULAR REEDUCATION: CPT

## 2024-06-25 NOTE — PROGRESS NOTES
Diagnosis:   Piriformis syndrome of right side (G57.01)  Right sided sciatica (M54.31)      Referring Provider: Bailey Gibbs  Date of Evaluation:    5/9/24    Precautions:  PMHx cancer Next MD visit:   none scheduled  Date of Surgery: n/a   Insurance Primary/Secondary: MEDICARE / [employer]     # Auth Visits: 10 POC            Subjective:  Some things getting better; Pins and needles easing up a bit but can still feel it. Decided to get the bursa injection; scheduled for July.  Reports the gabapentin and lidocaine patches seem to really be helping.     Pain: 6-7/10  R side only.  R piriformis into hip, throughout ITB, lateral knee. N/T foot.     Objective:   Glut med 2+/5 with pain R  Lower abs 2/5  (+) trendelenburg R  Antalgic gait, decreased R stance  (-) SLR  (+) tenderness multiple muscle groups, hip rotators, ITB throughout including insertion at patella    Assessment: ASLR R unable to due pain; modified to sitting.  Tolerated light shuttle well today without increased pain; will continue to assess.  Deconditioning UE/LE due to pain complaints for the past several months; discussed graded return to UE strengthening program    Goals:   (to be met in 10 visits)   (I) with HEP  Demonstrate proper body mechanics with functional squat and without c/o pain.  Improved lumbar flexion to enable don/doff socks and shoes without complaints.   Able to tolerate activities in stand/walk 60 mins with decreased pain to 4/10 levels max.  Able to sleep uninterrupted from pain.   Radicular symptoms at least 80% improvement with all ADL's.    Plan:   July 12 back to ortho for hip bursa injection.    Light strengthening as tolerated. STS, shuttle.       Date: 5/13/2024  TX#: 2/10 Date:   5/16/24              TX#: 3/10 Date:  6/18/24               TX#: 4/10 Date: 6/20/24                TX#: 5/ Date: 6/25/24  Tx#: 6/10   TE 30'  HEP review  Nustep 5' L3  DLHR 10x2 TE  Nustep 5'  DLHR partial range 10x2  Gastroc stretch 3x   TE  Pelvic tilt  5x  Reassess  HEP modifications  -bridge decreased range, glute set emphasis  -clam with incr hip/knee ext  -low level S/L hip abd/glut med progress TE  reassess  LTR 10x  Cross leg piriformis - reviewed options for home  HEP review - sciatic nerve glides progression    NR  TrAb BKFO 20x L/ 20x R   Sahrmann L 1 10x each  STS elevated table train hip hinge  Seated SLR modification - HEP TE  Nustep 5'  Shuttle 4 bands DL 10x3    Reviewed UE conditioning exercises: wall push up, 2 lb bicep curls    Bridge 10x2      TKE 20x    HEP: instructed  LAQ and  Seated SLR modification       Gastroc stretch 2x each NR  BKFO 10x3 each     Pelvic tilt 10x  SKTC 3x Pelvic tilt 10x  Pt ed NS sensitization, pacing principles, exercise effect        TrAb control :  BKFO 10x2  Low level sciatic nerve glides with bolster 10x  Manual 15'  R LE distraction 10\"x5  Gentle STM ITB   TrAb control :  BKFO 10x decreased range  Low level sciatic nerve glides with TKE bolster 10x  Manual 10'  Gentle STM ITB, post hip  Piriformis manual stretch/STM      HEP: STS modified range. Pelvic tilt, DKTC for pain relief. Bridge modified range/glute set.  Sciatic nerve glides, seated SLR, LAQ    Charges: TEx3 43'   Total Timed Treatment: 43'  Total Treatment Time: 43' min

## 2024-06-26 ENCOUNTER — LAB ENCOUNTER (OUTPATIENT)
Dept: LAB | Age: 74
End: 2024-06-26
Attending: INTERNAL MEDICINE

## 2024-06-26 ENCOUNTER — HOSPITAL ENCOUNTER (OUTPATIENT)
Dept: CV DIAGNOSTICS | Age: 74
Discharge: HOME | End: 2024-06-26
Attending: INTERNAL MEDICINE

## 2024-06-26 DIAGNOSIS — E03.9 ACQUIRED HYPOTHYROIDISM: ICD-10-CM

## 2024-06-26 DIAGNOSIS — C50.111 MALIGNANT NEOPLASM OF CENTRAL PORTION OF RIGHT BREAST IN FEMALE, ESTROGEN RECEPTOR POSITIVE (HCC): ICD-10-CM

## 2024-06-26 DIAGNOSIS — R06.09 DOE (DYSPNEA ON EXERTION): ICD-10-CM

## 2024-06-26 DIAGNOSIS — Z51.81 ENCOUNTER FOR MONITORING CARDIOTOXIC DRUG THERAPY: ICD-10-CM

## 2024-06-26 DIAGNOSIS — Z79.899 ENCOUNTER FOR MONITORING CARDIOTOXIC DRUG THERAPY: ICD-10-CM

## 2024-06-26 DIAGNOSIS — M89.9 BONE DISEASE: Primary | ICD-10-CM

## 2024-06-26 DIAGNOSIS — Z17.0 MALIGNANT NEOPLASM OF CENTRAL PORTION OF RIGHT BREAST IN FEMALE, ESTROGEN RECEPTOR POSITIVE (HCC): ICD-10-CM

## 2024-06-26 LAB
ANION GAP SERPL CALC-SCNC: 7 MMOL/L (ref 0–18)
BUN BLD-MCNC: 29 MG/DL (ref 9–23)
CALCIUM BLD-MCNC: 9 MG/DL (ref 8.5–10.1)
CHLORIDE SERPL-SCNC: 107 MMOL/L (ref 98–112)
CO2 SERPL-SCNC: 26 MMOL/L (ref 21–32)
CREAT BLD-MCNC: 0.88 MG/DL
EGFRCR SERPLBLD CKD-EPI 2021: 70 ML/MIN/1.73M2 (ref 60–?)
FASTING STATUS PATIENT QL REPORTED: YES
GLUCOSE BLD-MCNC: 105 MG/DL (ref 70–99)
NT-PROBNP SERPL-MCNC: 99 PG/ML (ref ?–125)
OSMOLALITY SERPL CALC.SUM OF ELEC: 296 MOSM/KG (ref 275–295)
POTASSIUM SERPL-SCNC: 3.8 MMOL/L (ref 3.5–5.1)
SODIUM SERPL-SCNC: 140 MMOL/L (ref 136–145)
TROPONIN I SERPL HS-MCNC: 4 NG/L
TSI SER-ACNC: 1.43 MIU/ML (ref 0.36–3.74)
VIT D+METAB SERPL-MCNC: 48.4 NG/ML (ref 30–100)

## 2024-06-26 PROCEDURE — 84484 ASSAY OF TROPONIN QUANT: CPT

## 2024-06-26 PROCEDURE — 93306 TTE W/DOPPLER COMPLETE: CPT | Performed by: INTERNAL MEDICINE

## 2024-06-26 PROCEDURE — 84443 ASSAY THYROID STIM HORMONE: CPT

## 2024-06-26 PROCEDURE — 83880 ASSAY OF NATRIURETIC PEPTIDE: CPT

## 2024-06-26 PROCEDURE — 80048 BASIC METABOLIC PNL TOTAL CA: CPT

## 2024-06-26 PROCEDURE — 36415 COLL VENOUS BLD VENIPUNCTURE: CPT

## 2024-06-26 PROCEDURE — 82306 VITAMIN D 25 HYDROXY: CPT

## 2024-06-27 ENCOUNTER — APPOINTMENT (OUTPATIENT)
Dept: PHYSICAL THERAPY | Age: 74
End: 2024-06-27
Attending: STUDENT IN AN ORGANIZED HEALTH CARE EDUCATION/TRAINING PROGRAM
Payer: MEDICARE

## 2024-07-02 ENCOUNTER — OFFICE VISIT (OUTPATIENT)
Dept: PHYSICAL THERAPY | Age: 74
End: 2024-07-02
Attending: STUDENT IN AN ORGANIZED HEALTH CARE EDUCATION/TRAINING PROGRAM
Payer: MEDICARE

## 2024-07-02 PROCEDURE — 97112 NEUROMUSCULAR REEDUCATION: CPT

## 2024-07-02 PROCEDURE — 97110 THERAPEUTIC EXERCISES: CPT

## 2024-07-02 NOTE — PROGRESS NOTES
Diagnosis:   Piriformis syndrome of right side (G57.01)  Right sided sciatica (M54.31)      Referring Provider: Bailey Gibbs  Date of Evaluation:    5/9/24    Precautions:  PMHx cancer Next MD visit:   none scheduled  Date of Surgery: n/a   Insurance Primary/Secondary: MEDICARE / [employer]     # Auth Visits: 10 POC            Subjective:  \"The nights are the worst\"  wakes up about 5 am with the leg.  Gabapentin helps get to sleep and easing the pain.   Tingling into the foot has eased up, not constant all day.  Getting off the floor not easy but more doable.  Pain: 6-7/10  R side only.  R piriformis into hip, throughout ITB, lateral knee. N/T foot.     Objective:   Glut med 2+/5 with pain R  Lower abs 2/5  (+) trendelenburg R  Antalgic gait, decreased R stance  (-) SLR  (+) tenderness multiple muscle groups, hip rotators, ITB throughout including insertion at patella    Assessment: Excellent follow through with HEP. Improved bridge with less pain overall. Tolerating mat exercise for quads and light shuttle; SLR limited due to hip pain.       Goals:   (to be met in 10 visits)   (I) with HEP  Demonstrate proper body mechanics with functional squat and without c/o pain.  Improved lumbar flexion to enable don/doff socks and shoes without complaints.   Able to tolerate activities in stand/walk 60 mins with decreased pain to 4/10 levels max.  Able to sleep uninterrupted from pain.   Radicular symptoms at least 80% improvement with all ADL's.    Plan:   Quad and glut strength as tolerated. Progression to WB.   July 12 back to ortho for hip bursa injection.          Date: 5/13/2024  TX#: 2/10 Date:   5/16/24              TX#: 3/10 Date:  6/18/24               TX#: 4/10 Date: 6/20/24                TX#: 5/ Date: 6/25/24  Tx#: 6/10 7/2/24  7/10     TE 30'  HEP review  Nustep 5' L3  DLHR 10x2 TE  Nustep 5'  DLHR partial range 10x2  Gastroc stretch 3x   TE  Pelvic tilt 5x  Reassess  HEP modifications  -bridge decreased range, glute  set emphasis  -clam with incr hip/knee ext  -low level S/L hip abd/glut med progress TE  reassess  LTR 10x  Cross leg piriformis - reviewed options for home  HEP review - sciatic nerve glides progression    NR  TrAb BKFO 20x L/ 20x R   Sahrmann L 1 10x each  STS elevated table train hip hinge  Seated SLR modification - HEP TE  Nustep 5'  Shuttle 4 bands DL 10x3    Reviewed UE conditioning exercises: wall push up, 2 lb bicep curls    Bridge 10x2      TKE 20x    HEP: instructed  LAQ and  Seated SLR modification     TE  Nustep 5'  Shuttle 5 bands DL  20x2  Passive ham, piriformis stretches 5'    LAQ 20x    NR  TrAb control Sahrmann L1 10x each  -single leg 10x --> alt heel taps 10x  Floor transfer technique instruct/ 1/2 kneel  Nerve glides 20x  Bridge 20x   Gastroc stretch 2x each NR  BKFO 10x3 each      Pelvic tilt 10x  SKTC 3x Pelvic tilt 10x  Pt ed NS sensitization, pacing principles, exercise effect         TrAb control :  BKFO 10x2  Low level sciatic nerve glides with bolster 10x  Manual 15'  R LE distraction 10\"x5  Gentle STM ITB   TrAb control :  BKFO 10x decreased range  Low level sciatic nerve glides with TKE bolster 10x  Manual 10'  Gentle STM ITB, post hip  Piriformis manual stretch/STM       HEP: STS modified range. Pelvic tilt, DKTC for pain relief. Bridge modified range/glute set.  Sciatic nerve glides, seated SLR, LAQ    Charges: TEx1 18' NR x 2 25'   Total Timed Treatment: 43'  Total Treatment Time: 43' min

## 2024-07-08 ENCOUNTER — HOSPITAL ENCOUNTER (OUTPATIENT)
Dept: ULTRASOUND IMAGING | Age: 74
Discharge: HOME | End: 2024-07-08
Attending: SURGERY
Payer: MEDICARE

## 2024-07-08 DIAGNOSIS — C50.911 MALIGNANT NEOPLASM OF RIGHT FEMALE BREAST, UNSPECIFIED ESTROGEN RECEPTOR STATUS, UNSPECIFIED SITE OF BREAST (HCC): ICD-10-CM

## 2024-07-08 DIAGNOSIS — Z90.13 HISTORY OF BILATERAL MASTECTOMY: ICD-10-CM

## 2024-07-08 PROCEDURE — 76641 ULTRASOUND BREAST COMPLETE: CPT | Performed by: SURGERY

## 2024-07-09 ENCOUNTER — OFFICE VISIT (OUTPATIENT)
Dept: PHYSICAL THERAPY | Age: 74
End: 2024-07-09
Attending: STUDENT IN AN ORGANIZED HEALTH CARE EDUCATION/TRAINING PROGRAM
Payer: MEDICARE

## 2024-07-09 PROCEDURE — 97140 MANUAL THERAPY 1/> REGIONS: CPT

## 2024-07-09 PROCEDURE — 97110 THERAPEUTIC EXERCISES: CPT

## 2024-07-09 NOTE — PROGRESS NOTES
Diagnosis:   Piriformis syndrome of right side (G57.01)  Right sided sciatica (M54.31)      Referring Provider: Bailey Gibbs  Date of Evaluation:    5/9/24    Precautions:  PMHx cancer Next MD visit:   none scheduled  Date of Surgery: n/a   Insurance Primary/Secondary: MEDICARE / [employer]     # Auth Visits: 10 POC            Subjective:  Nights are worse. Gabapentin may not be working as well.  .   Also had a bad weekend, better today but did some yard work over the weekend.  Stairs still leading with the L only.  Pain: 6-7/10  R side only.  R piriformis into hip, throughout ITB, lateral knee. N/T foot.     Objective:   (+) ITB/(+) phillip  Glut med 2+/5 with pain R  Lower abs 2/5  (+) trendelenburg R  Antalgic gait, decreased R stance  (-) SLR  (+) tenderness multiple muscle groups, hip rotators, ITB throughout including insertion at patella    Assessment: R hip/ITB pain continues to limit functional mobility. Exquisite tenderness ITB throughout. Injection is scheduled for Friday.  Light quad strengthening tolerated; unable to fannie SLR due to pain.      Goals:   (to be met in 10 visits)   (I) with HEP  Demonstrate proper body mechanics with functional squat and without c/o pain.  Improved lumbar flexion to enable don/doff socks and shoes without complaints.   Able to tolerate activities in stand/walk 60 mins with decreased pain to 4/10 levels max.  Able to sleep uninterrupted from pain.   Radicular symptoms at least 80% improvement with all ADL's.    Plan:   Hip bursa Injection planned for friday.             Date: 5/13/2024  TX#: 2/10 Date:   5/16/24              TX#: 3/10 Date:  6/18/24               TX#: 4/10 Date: 6/20/24                TX#: 5/ Date: 6/25/24  Tx#: 6/10 7/2/24  7/10   7/9/24  8/10     TE 30'  HEP review  Nustep 5' L3  DLHR 10x2 TE  Nustep 5'  DLHR partial range 10x2  Gastroc stretch 3x   TE  Pelvic tilt 5x  Reassess  HEP modifications  -bridge decreased range, glute set emphasis  -clam with incr hip/knee  ext  -low level S/L hip abd/glut med progress TE  reassess  LTR 10x  Cross leg piriformis - reviewed options for home  HEP review - sciatic nerve glides progression    NR  TrAb BKFO 20x L/ 20x R   Sahrmann L 1 10x each  STS elevated table train hip hinge  Seated SLR modification - HEP TE  Nustep 5'  Shuttle 4 bands DL 10x3    Reviewed UE conditioning exercises: wall push up, 2 lb bicep curls    Bridge 10x2      TKE 20x    HEP: instructed  LAQ and  Seated SLR modification     TE  Nustep 5'  Shuttle 5 bands DL  20x2  Passive ham, piriformis stretches 5'    LAQ 20x    NR  TrAb control Sahrmann L1 10x each  -single leg 10x --> alt heel taps 10x  Floor transfer technique instruct/ 1/2 kneel  Nerve glides 20x  Bridge 20x TE  Nustep   Shuttle 5 bands 15x DL  SL L 3 bands 15x, R 2 bands 15x  TKE 20x each with nerve glide  LAQ 10x  TrAb single leg march  Clam 30 x --> modified leg lift glut med 10x    Manual  STM/ light roller ITB R   Gastroc stretch 2x each NR  BKFO 10x3 each       Pelvic tilt 10x  SKTC 3x Pelvic tilt 10x  Pt ed NS sensitization, pacing principles, exercise effect          TrAb control :  BKFO 10x2  Low level sciatic nerve glides with bolster 10x  Manual 15'  R LE distraction 10\"x5  Gentle STM ITB   TrAb control :  BKFO 10x decreased range  Low level sciatic nerve glides with TKE bolster 10x  Manual 10'  Gentle STM ITB, post hip  Piriformis manual stretch/STM        HEP: STS modified range. Pelvic tilt, DKTC for pain relief. Bridge modified range/glute set.  Sciatic nerve glides, seated SLR, LAQ    Charges: TEx2 30' man x 1 10'   Total Timed Treatment: 40'  Total Treatment Time: 40' min

## 2024-07-10 ENCOUNTER — OFFICE VISIT (OUTPATIENT)
Facility: CLINIC | Age: 74
End: 2024-07-10
Payer: MEDICARE

## 2024-07-10 VITALS
HEART RATE: 80 BPM | WEIGHT: 183 LBS | DIASTOLIC BLOOD PRESSURE: 83 MMHG | SYSTOLIC BLOOD PRESSURE: 138 MMHG | RESPIRATION RATE: 18 BRPM | OXYGEN SATURATION: 99 % | BODY MASS INDEX: 33 KG/M2 | TEMPERATURE: 98 F

## 2024-07-10 DIAGNOSIS — N63.22 MASS OF UPPER INNER QUADRANT OF LEFT BREAST: Primary | ICD-10-CM

## 2024-07-10 PROCEDURE — 99204 OFFICE O/P NEW MOD 45 MIN: CPT | Performed by: SURGERY

## 2024-07-11 NOTE — CONSULTS
Breast Surgery New Patient Consultation    Vianney Daniel is a 73 year old patient, a previous patient of Dr. Elizabeth and Dr. Quiles, referred by Dr. Gibbs, who presents for evaluation of abnormal imaging.    History of Present Illness:   Ms. Vianney Daniel is a 73 year old woman with a past history significant for right breast cancer in 2015 s/p bilateral mastectomy with FEMI flap reconstruction who presents for breast cancer follow up and discussion of imaging results. Her pathology from surgery in 2015 showed R breast invasive ductal carcinoma, grade 3, 1.5 cm in greatest dimension. Margins were negative and 0/3 lymph nodes harbored metastatic disease ([id number]). ER positive, HER2 positive. She underwent Herceptin therapy and did have some cardiac damage.     In October 2023 she had an ultrasound that showed a 5 mm nodule in the 1:00 position of her left breast although that resolved on follow up imaging in March 2024. On 7/8/2024 she had an ultrasound that showed multiple, bilateral, benign- appearing cysts, but then she got a letter that there was a concern and she required clinical follow up.      She denies any nipple discharge, skin changes, or axillary adenopathy. She does not have breast pain. She does have family history of breast cancer. Her sister had breast cancer at age 62 (now 71). Her daughter was first diagnosed at age 46 and has had breast cancer \"3 times\" including a reported recurrence and a contralateral breast cancer. The patient does have a history of genetic testing. She has a VUS in PALB2 gene.           Past Medical History:    Acquired hypothyroidism    Arthritis    Seeing a Chiroprator for ankles knees and hips especially ri    Arthritis of right hip    Basal cell carcinoma (BCC) of right upper arm    Breast cancer (HCC)    right breast    Cancer (HCC)    CKD (chronic kidney disease) stage 3, GFR 30-59 ml/min (HCC)    Colon polyp, hyperplastic    Colon polyp, hyperplastic     COPD (chronic obstructive pulmonary disease) (HCC)    Essential hypertension    Heart disease    WEAKEND HEART    High blood pressure    History of adenomatous polyp of colon    History of squamous cell carcinoma    Left cheek, 2018    HTN (hypertension)    Hypothyroidism    Obesity    Obstructive sleep apnea (adult) (pediatric)    AHI 15 supine AHI 22 non-supien AHI 8 SaO2 faith 68 %    Onychomycosis    JAMES on CPAP    Osteoarthritis    Periorbital swelling    nickel allergy    Personal history of antineoplastic chemotherapy    PONV (postoperative nausea and vomiting)    nausea    Prediabetes    Rosacea    Sleep apnea    NO CPAP USE AT THIS TIME    Spinal stenosis of lumbar region at multiple levels    Subclinical hypothyroidism    Swelling of both ankles    UTI (urinary tract infection)    Venous insufficiency    Visual impairment    glasses       Past Surgical History:   Procedure Laterality Date    Arthroscopy of joint unlisted      Biopsy of breast, needle core      x3  benign    Breast reconstruction Bilateral 2/23/15    with (FEMI)abdominal free flap    Colonoscopy  age 57 yrs    Colonoscopy      Colonoscopy N/A 3/16/2018    Procedure: COLONOSCOPY, POSSIBLE BIOPSY, POSSIBLE POLYPECTOMY 00344;  Surgeon: Sonja Cedillo MD;  [facility] [facility]    D & c      Mastectomy left      Mastectomy right      Mri breast biopsy right Right 1/13/15    Needle biopsy left      Needle biopsy right            Other Bilateral 2/23/15    mastectomies      Other  2022    LEFT FRONTAL NEEDLE BIOPSY OF CALVARIAL LESION WITH NEURO NAVIGATIONLeftGeneral    Other surgical history  03/16/15    power port    Other surgical history  2015    Bilateral mastectomy    Other surgical history  2018    excision skin left cheek- actinic keratosis    Shoulder surg proc unlisted Left     removal of benign lump    Skin surgery      Tonsillectomy      Us breast biopsy Right 12/10/14        Gynecological History:  Menarche at age 14 and LMP n/a  Pt is a   Pt was 22 years old at time of first pregnancy.    She has cumulative breastfeeding history of 3 months.  Age of Menopause: 54  Type: natural  She denies any history of hormone replacement therapy   She has history of oral contraceptive use for 32 years, last .   She denies infertility treatment to achieve pregnancy.    Medications:     aspirin 81 MG Oral Tab EC Take 1 tablet (81 mg total) by mouth daily.      gabapentin 300 MG Oral Cap Take 1 capsule (300 mg total) by mouth nightly.      lidocaine 5 % External Patch Place 1 patch onto the skin daily.      levothyroxine 75 MCG Oral Tab Take 1 tablet (75 mcg total) by mouth before breakfast. 90 tablet 3    fluticasone propionate 50 MCG/ACT Nasal Suspension 1 spray by Nasal route 2 (two) times daily. 48 g 3    ESZOPICLONE 1 MG Oral Tab TAKE 1 TABLET(1 MG) BY MOUTH EVERY NIGHT AS NEEDED 30 tablet 0    ENTRESTO 49-51 MG Oral Tab Take 1 tablet by mouth 2 (two) times daily.      riboflavin 100 MG Oral Tab Vitamin B-2  100 mg tablet, [RxNorm: 368231]      carvedilol 3.125 MG Oral Tab Take 1 tablet (3.125 mg total) by mouth 2 (two) times daily with meals.      Pseudoephedrine-Acetaminophen (SM NON-ASPRIN SINUS OR) LOW ASPRIN      Ginkgo Biloba (GINKOBA OR) Take 240 mg by mouth every morning.      Omega 3-6-9 Fatty Acids (OMEGA 3-6-9 COMPLEX) Oral Cap Take 1 capsule by mouth daily.      Tart Molina 1200 MG Oral Cap Take 1 capsule by mouth as needed.      Magnesium 500 MG Oral Tab Take 1 tablet (500 mg total) by mouth daily.      Cholecalciferol (VITAMIN D3) 5000 UNITS Oral Cap Take 1 capsule (5,000 Units total) by mouth daily.      Calcium Carb-Cholecalciferol 500-600 MG-UNIT Oral Tab Take 1 tablet by mouth daily.        Biotin 5000 MCG Oral Tab Take 1 tablet (5 mg total) by mouth daily.      Vitamin B-12 500 MCG Oral Tab Take 1 tablet (500 mcg total) by mouth daily.      Pyridoxine HCl (VITAMIN  B-6) 100 MG Oral Tab Take 1 tablet (100 mg total) by mouth daily.      vitamin E 400 UNITS Oral Cap Take 1 capsule (400 Units total) by mouth daily.      Chromium-Cinnamon (CINNAMON PLUS CHROMIUM OR) Take 2,000 mg by mouth 2 (two) times a day.      Cranberry 500 MG Oral Cap Take 1 capsule by mouth 2 (two) times daily.        Lutein-Zeaxanthin 25-5 MG Oral Cap Take 1 capsule by mouth daily.      Ferrous Sulfate 325 (65 FE) MG Oral Tab Take 0.2 tablets (65 mg total) by mouth daily.      Vitamin C (VITAMIN C) 500 MG Oral Tab Take 1 tablet (500 mg total) by mouth daily.         Allergies:    Allergies   Allergen Reactions    Adhesive Tape RASH and ITCHING     Tacho and tacho    Bacitracin RASH and ITCHING    Codeine RASH, ITCHING and NAUSEA AND VOMITING    Doxycycline RASH and ITCHING    Levaquin [Levofloxacin] RASH and ITCHING    Neosporin [Neomycin-Bacitracin-Polymyxin] RASH and ITCHING    Nickel RASH and ITCHING    Other RASH and ITCHING     Dermabond      SURGICAL STEEL     Pcns [Penicillins] RASH and ITCHING    Polysporin [Bacitracin-Polymyxin B] RASH and ITCHING    Soap RASH and ITCHING     gojo        Family History:   Family History   Problem Relation Age of Onset    Glaucoma Mother     Dementia Mother     Cancer Mother         Bladder Cancer    Hypertension Mother     Fuchs' dystrophy Mother     Other (htn) Mother     Other (osteoporosis) Mother     Diabetes Father     Other (heart issues) Paternal Grandfather     Diabetes Maternal Grandmother         \"runs on mothers side\"    Other (Other) Maternal Grandmother     Cancer Sister 64        Breast Cancer    Cancer Sister         Breast Cancer    Fuchs' dystrophy Sister     Cancer Daughter 46        Breast Cancer    No Known Problems Daughter        She is not of Ashkenazi Jainism ancestry.    Social History:  History   Alcohol Use    0.0 - 1.0 standard drinks of alcohol/week     Comment: Social 1-2 month Never been drunk       History   Smoking Status     Never   Smokeless Tobacco    Never       Review of Systems:    Review of Systems   Constitutional:  Positive for fatigue. Negative for activity change, appetite change, chills and unexpected weight change.   HENT:  Positive for congestion. Negative for ear pain, hearing loss, nosebleeds, sore throat, trouble swallowing and voice change.    Eyes:  Negative for pain and visual disturbance.   Respiratory:  Positive for shortness of breath. Negative for cough and chest tightness.    Cardiovascular:  Negative for chest pain, palpitations and leg swelling.   Gastrointestinal:  Negative for nausea, vomiting, abdominal pain, diarrhea, constipation and blood in stool.   Endocrine: Negative for cold intolerance and heat intolerance.   Genitourinary:  Negative for dysuria, hematuria and difficulty urinating.   Musculoskeletal:  Positive for myalgias, back pain, joint pain and neck pain. Negative for joint swelling.   Skin:  Positive for rash. Negative for color change and wound.   Allergic/Immunologic: Negative for environmental allergies.   Neurological:  Negative for tremors, syncope, facial asymmetry, speech difficulty and weakness.   Hematological:  Negative for adenopathy. Does not bruise/bleed easily.   Psychiatric/Behavioral:  Negative for hallucinations, behavioral problems, confusion, agitation and depressed mood.       Otherwise as per HPI.    Physical Exam:    /83 (BP Location: Right arm, Patient Position: Sitting, Cuff Size: adult)   Pulse 80   Temp 98 °F (36.7 °C)   Resp 18   Wt 83 kg (183 lb)   SpO2 99%   BMI 33.47 kg/m²     Physical Exam  Vitals reviewed.   Constitutional:       Appearance: Normal appearance.   HENT:      Head: Normocephalic and atraumatic.   Eyes:      Extraocular Movements: Extraocular movements intact.      Pupils: Pupils are equal, round, and reactive to light.   Cardiovascular:      Rate and Rhythm: Normal rate.   Pulmonary:      Effort: Pulmonary effort is normal.   Chest:    Breasts:     Right: Normal. No mass, nipple discharge, skin change or tenderness.      Left: Normal. No mass, nipple discharge, skin change or tenderness.       Abdominal:      General: Abdomen is flat.      Palpations: Abdomen is soft.   Musculoskeletal:         General: Normal range of motion.      Cervical back: Normal range of motion and neck supple.   Lymphadenopathy:      Upper Body:      Right upper body: No supraclavicular or axillary adenopathy.      Left upper body: No supraclavicular or axillary adenopathy.   Skin:     General: Skin is warm and dry.   Neurological:      General: No focal deficit present.      Mental Status: She is alert and oriented to person, place, and time.   Psychiatric:         Mood and Affect: Mood normal.        Bra size: 38D    Labs/imaging: reviewed in EPIC    Impression:   Ms. Vianney Dainel is a 73 year old woman that presents for follow up of right breast cancer s/p bilateral mastectomy with FEMI flap reconstruction for HEF2 + invasive ductal carcinoma ([id number]).    Discussion and Plan:  I had a discussion with the Patient regarding her breast exam. On exam today I was able to palpate small left breast masses that correspond to the cystic structures seen on the ultrasound. I personally reviewed her recent imaging and we discussed this. I will reach out to radiology to make sure we're not missing something as the patient received the letter about a \"concerning imaging finding\". We will order a follow up ultrasound in a year. She will continue self breast exams. She states these nodules have slightly gotten bigger in the past, but imaging showed them to be the same size. I reassured her that she no longer has breast tissue and I think these cysts may be just within the fat tissue from the flap. She will return to the office in 1 year.     Bilateral US breast complete ordered for 7/2025    She was given ample opportunity for questions and those questions were answered  to her satisfaction. She has been encouraged to contact the office with any questions or concerns prior to her next appointment. This encounter lasted a total of 40 minutes, more than 50% of which was dedicated to the discussion of management options.     Dayana Flynn MD  Breast Surgical Oncology    CC: Dr. Gibbs

## 2024-07-12 DIAGNOSIS — N63.22 MASS OF UPPER INNER QUADRANT OF LEFT BREAST: Primary | ICD-10-CM

## 2024-07-16 ENCOUNTER — OFFICE VISIT (OUTPATIENT)
Dept: PHYSICAL THERAPY | Age: 74
End: 2024-07-16
Attending: STUDENT IN AN ORGANIZED HEALTH CARE EDUCATION/TRAINING PROGRAM
Payer: MEDICARE

## 2024-07-16 PROCEDURE — 97110 THERAPEUTIC EXERCISES: CPT

## 2024-07-16 PROCEDURE — 97140 MANUAL THERAPY 1/> REGIONS: CPT

## 2024-07-16 NOTE — PROGRESS NOTES
Diagnosis:   Piriformis syndrome of right side (G57.01)  Right sided sciatica (M54.31)      Referring Provider: Bailey Gibbs  Date of Evaluation:    5/9/24    Precautions:  PMHx cancer Next MD visit:   none scheduled  Date of Surgery: n/a   Insurance Primary/Secondary: MEDICARE / AARP     # Auth Visits: 10 POC           Progress Summary  Pt has attended 9 visits in Physical Therapy.   0 cancels    Subjective:  Reports overall the therapy has been helpful, especially to learn the new exercises, getting off the floor a little better.  Reports \"pain is still there but on the whole better.\" Hip bursa injection friday very painful, but did note some immediate relief within a couple hours. But then yesterday pulled muscle in leg rushing into an appt, \"went to chiro to fix it\".  It was very painful but somewhat better  Night still difficult; Gabapentin, lidocaine helpful at night, pain is less.   Working on nighttime regimen as well; Was sleeping  2-4 hours/night now 5-6 hours sleep, 7 hours sleep even noted one night.   Pain:   6/10 R LE, inside thigh, groin and then around up the back.  1-2/10 intermittent below the knee.   Foot tingling at times    Objective:   ROM: full forward bend without pain.                 Ext 50% with LBP (-) radiating symptoms.  Relief with flexion based stretches; hx of stenosis.   (+) ITB/(+) phillip  Strength: pain limiting MMT; will cont to assess.   Glut med 2+/5 with pain R  Lower abs 2/5  Distal myotomes strong/equal    Gait: (+) trendelenburg R.           Antalgic gait, decreased R stance  (-) SLR  (+) tenderness multiple muscle groups, hip rotators, ITB throughout including insertion at patella    Assessment: R hip/ITB pain continues to limit functional mobility. Exquisite tenderness multiple muscles groups throughout hip and ITB.  Bursa injection was Friday with relief noted. Pt has limited tolerance for icing. Light STM is tolerated. Excellent follow through with HEP.   Pt tends to  overdo but is working through better pacing principles and adjustments.  Pt is also incorporating sleep hygiene and nervous system desensitization principles.  Sleep has improved.  Functionally, stairs remain difficult; often  leading with the L only.  Pt will benefit from progression to more functional strengthening as pain is better managed.     Goals:   (to be met in 10 visits)   (I) with HEP - ongoing   Demonstrate proper body mechanics with functional squat and without c/o pain. Partially met due to pain  Improved lumbar flexion to enable don/doff socks and shoes without complaints. Met, full flexion  Able to tolerate activities in stand/walk 60 mins with decreased pain to 4/10 levels max. Not consistently met  Able to sleep uninterrupted from pain.  Not consistently met  Radicular symptoms at least 80% improvement with all ADL's. - in progress    Plan:  Recommend to extend date of certification due to pt leaving out of country.  Pt just received bursa injection. Recommend to resume PT in September 1-3 additional visits to make adjustments to HEP and then pt should be ready for d/c.     Patient/Family/Caregiver was advised of these findings, precautions, and treatment options and has agreed to actively participate in planning and for this course of care.    Thank you for your referral. If you have any questions, please contact me at Dept: [phone].    Sincerely,  Electronically signed by therapist: Keiko Tejeda, PT     Physician's certification required:  Yes  Please co-sign or sign and return this letter via fax as soon as possible to [phone].   I certify the need for these services furnished under this plan of treatment and while under my care.    X___________________________________________________ Date____________________    Certification From: 7/16/2024  To:10/14/2024             Date: 5/13/2024  TX#: 2/10 Date:   5/16/24              TX#: 3/10 Date:  6/18/24               TX#: 4/10 Date:  6/20/24                TX#: 5/ Date: 6/25/24  Tx#: 6/10 7/2/24  7/10   7/9/24  8/10   7/16/24  9/10       TE 30'  HEP review  Nustep 5' L3  DLHR 10x2 TE  Nustep 5'  DLHR partial range 10x2  Gastroc stretch 3x   TE  Pelvic tilt 5x  Reassess  HEP modifications  -bridge decreased range, glute set emphasis  -clam with incr hip/knee ext  -low level S/L hip abd/glut med progress TE  reassess  LTR 10x  Cross leg piriformis - reviewed options for home  HEP review - sciatic nerve glides progression    NR  TrAb BKFO 20x L/ 20x R   Sahrmann L 1 10x each  STS elevated table train hip hinge  Seated SLR modification - HEP TE  Nustep 5'  Shuttle 4 bands DL 10x3    Reviewed UE conditioning exercises: wall push up, 2 lb bicep curls    Bridge 10x2      TKE 20x    HEP: instructed  LAQ and  Seated SLR modification     TE  Nustep 5'  Shuttle 5 bands DL  20x2  Passive ham, piriformis stretches 5'    LAQ 20x    NR  TrAb control Sahrmann L1 10x each  -single leg 10x --> alt heel taps 10x  Floor transfer technique instruct/ 1/2 kneel  Nerve glides 20x  Bridge 20x TE  Nustep   Shuttle 5 bands 15x DL  SL L 3 bands 15x, R 2 bands 15x  TKE 20x each with nerve glide  LAQ 10x  TrAb single leg march  Clam 30 x --> modified leg lift glut med 10x    Manual  STM/ light roller ITB R   TE  reassess  Modify sidelying hip abd; hold 2 days due to pain  LTR modified to single BKFO, progress to bilat    Seated LAQ ->SLR for quad strength  STS elevated mat table, hip hinge review with increased quad/glute    Manual   STM ITB - light tolerated. Pt cont with self STM/desensitization     Gastroc stretch 2x each NR  BKFO 10x3 each        Pelvic tilt 10x  SKTC 3x Pelvic tilt 10x  Pt ed NS sensitization, pacing principles, exercise effect           TrAb control :  BKFO 10x2  Low level sciatic nerve glides with bolster 10x  Manual 15'  R LE distraction 10\"x5  Gentle STM ITB   TrAb control :  BKFO 10x decreased range  Low level sciatic nerve glides with TKE bolster  10x  Manual 10'  Gentle STM ITB, post hip  Piriformis manual stretch/STM         HEP: STS modified range. Pelvic tilt, DKTC for pain relief. Bridge modified range/glute set.  Sciatic nerve glides, seated SLRAGUILAR    Charges: TEx2 30' man x 1 15'   Total Timed Treatment: 45'  Total Treatment Time: 45' min

## 2024-07-23 ENCOUNTER — APPOINTMENT (OUTPATIENT)
Dept: PHYSICAL THERAPY | Age: 74
End: 2024-07-23
Attending: STUDENT IN AN ORGANIZED HEALTH CARE EDUCATION/TRAINING PROGRAM
Payer: MEDICARE

## 2024-08-27 ENCOUNTER — TELEPHONE (OUTPATIENT)
Dept: PHYSICAL THERAPY | Age: 74
End: 2024-08-27

## 2024-08-28 NOTE — TELEPHONE ENCOUNTER
Received email from pt with reports of worse pain while out of town.   Called pt to discuss and LM.  Pt scheduled to return to PT in Sept.

## 2024-09-16 NOTE — PROGRESS NOTES
Diagnosis:   Piriformis syndrome of right side (G57.01)  Right sided sciatica (M54.31)      Referring Provider: Bailey Gibbs  Date of Evaluation:    5/9/24    Precautions:  PMHx cancer Next MD visit:   none scheduled  Date of Surgery: n/a   Insurance Primary/Secondary: MEDICARE / [employer]     # Auth Visits: 10 POC.  Signed extension POC thru 10/14/24      Progress Summary  Pt has attended 10 visits in Physical Therapy.   0 cancels    Subjective: The whole R leg is bothersome, from the ankle all the way up the leg, hip and sciatic.  All the walking, standing, cooking, shopping, stairs, sitting [place] car ride [place].  Began using the cane.   Difficulty lifting the leg into the car.     Pain:   5-7/10 R LE R LB/hip down leg  Foot tingling at times      Objective:   R hip restriction: ER 15 deg, IR 10 deg  +pain  (+) Garrick's R  (+) R hip scour      ROM: full forward bend without pain.                 Ext 50% with LBP (-) radiating symptoms.  Relief with flexion based stretches; hx of stenosis.   (+) ITB/(+) phillip  Strength: pain limiting MMT; will cont to assess.   Glut med 2+/5 with pain R  Lower abs 2/5  Distal myotomes strong/equal    Gait: (+) trendelenburg R, antalgic.  Began using cane with relief.  (-) SLR  (+) tenderness multiple muscle groups, hip rotators, ITB throughout including insertion at patella    Assessment: [place] from [place] after increased stairs/walking and activity, with increased pain throughout R LE and decreased R hip mobility.   R hip area/ITB sensitive and tender to palpation throughout.   Began using cane and with relief.    Pt continues to have excellent follow through with HEP.      7/16/24 Progress Note: R hip/ITB pain continues to limit functional mobility. Exquisite tenderness multiple muscles groups throughout hip and ITB.  Bursa injection was Friday with relief noted. Pt has limited tolerance for icing. Light STM is tolerated. Excellent follow through with HEP.   Pt  tends to overdo but is working through better pacing principles and adjustments.  Pt is also incorporating sleep hygiene and nervous system desensitization principles.  Sleep has improved.  Functionally, stairs remain difficult; often  leading with the L only.  Pt will benefit from progression to more functional strengthening as pain is better managed.     Goals:   (to be met in 10 visits)   (I) with HEP - ongoing   Demonstrate proper body mechanics with functional squat and without c/o pain. Partially met due to pain  Improved lumbar flexion to enable don/doff socks and shoes without complaints. Met, full flexion  Able to tolerate activities in stand/walk 60 mins with decreased pain to 4/10 levels max. Not consistently met  Able to sleep uninterrupted from pain.  Not consistently met  Radicular symptoms at least 80% improvement with all ADL's. - in progress    Plan:  PT has completed 10/10 initial visits.  Cont HEP independently as pt follows up with ortho in Oct for worsening hip pain.      Patient/Family/Caregiver was advised of these findings, precautions, and treatment options and has agreed to actively participate in planning and for this course of care.    Thank you for your referral. If you have any questions, please contact me at Dept: [phone].    Sincerely,  Electronically signed by therapist: Keiko Tejeda, PT     Physician's certification required:  no             Date: 5/13/2024  TX#: 2/10 Date:   5/16/24              TX#: 3/10 Date:  6/18/24               TX#: 4/10 Date: 6/20/24                TX#: 5/ Date: 6/25/24  Tx#: 6/10 7/2/24  7/10   7/9/24  8/10   7/16/24  9/10     9/16/24  10/10   TE 30'  HEP review  Nustep 5' L3  DLHR 10x2 TE  Nustep 5'  DLHR partial range 10x2  Gastroc stretch 3x   TE  Pelvic tilt 5x  Reassess  HEP modifications  -bridge decreased range, glute set emphasis  -clam with incr hip/knee ext  -low level S/L hip abd/glut med progress TE  reassess  LTR 10x  Cross leg  piriformis - reviewed options for home  HEP review - sciatic nerve glides progression    NR  TrAb BKFO 20x L/ 20x R   Sahrmann L 1 10x each  STS elevated table train hip hinge  Seated SLR modification - HEP TE  Nustep 5'  Shuttle 4 bands DL 10x3    Reviewed UE conditioning exercises: wall push up, 2 lb bicep curls    Bridge 10x2      TKE 20x    HEP: instructed  LAQ and  Seated SLR modification     TE  Nustep 5'  Shuttle 5 bands DL  20x2  Passive ham, piriformis stretches 5'    LAQ 20x    NR  TrAb control Sahrmann L1 10x each  -single leg 10x --> alt heel taps 10x  Floor transfer technique instruct/ 1/2 kneel  Nerve glides 20x  Bridge 20x TE  Nustep   Shuttle 5 bands 15x DL  SL L 3 bands 15x, R 2 bands 15x  TKE 20x each with nerve glide  LAQ 10x  TrAb single leg march  Clam 30 x --> modified leg lift glut med 10x    Manual  STM/ light roller ITB R   TE  reassess  Modify sidelying hip abd; hold 2 days due to pain  LTR modified to single BKFO, progress to bilat    Seated LAQ ->SLR for quad strength  STS elevated mat table, hip hinge review with increased quad/glute    Manual   STM ITB - light tolerated. Pt cont with self STM/desensitization       TE  Reassess  HEP review and modifications due to pain  Pt ed use of cane, height adjustment, sequencing.  Bridge 10x2  Sciatic nerve glides supine 15x  BKFO modified range as fannie.    Manual   Light STM fannie ITB, hip rotators - Pt cont with self STM/desensitization  R LE distraction - no relief today       Gastroc stretch 2x each NR  BKFO 10x3 each         Pelvic tilt 10x  SKTC 3x Pelvic tilt 10x  Pt ed NS sensitization, pacing principles, exercise effect            TrAb control :  BKFO 10x2  Low level sciatic nerve glides with bolster 10x  Manual 15'  R LE distraction 10\"x5  Gentle STM ITB   TrAb control :  BKFO 10x decreased range  Low level sciatic nerve glides with TKE bolster 10x  Manual 10'  Gentle STM ITB, post hip  Piriformis manual stretch/STM          HEP: STS  modified range. Pelvic tilt, DKTC for pain relief. Bridge modified range/glute set.  Sciatic nerve glides, seated SLR, LAQ    Charges: TEx2 30' man x 1 13'   Total Timed Treatment: 43'  Total Treatment Time: 43' min   No new orders received

## 2024-09-17 ENCOUNTER — OFFICE VISIT (OUTPATIENT)
Dept: PHYSICAL THERAPY | Age: 74
End: 2024-09-17
Attending: STUDENT IN AN ORGANIZED HEALTH CARE EDUCATION/TRAINING PROGRAM
Payer: MEDICARE

## 2024-09-17 DIAGNOSIS — F51.01 PRIMARY INSOMNIA: ICD-10-CM

## 2024-09-17 PROCEDURE — 97140 MANUAL THERAPY 1/> REGIONS: CPT

## 2024-09-17 PROCEDURE — 97110 THERAPEUTIC EXERCISES: CPT

## 2024-09-17 NOTE — TELEPHONE ENCOUNTER
Last OV relevant to medication: 6/24/24  Last refill date: 6/20/24 #30/refills: 0  When pt was asked to return for OV: 12/1/24  Upcoming appt/reason:   Future Appointments   Date Time Provider Department Center   10/15/2024  9:00 AM PF [facility] PF [facility]   10/16/2024 10:45 AM Dayana Flynn MD [facility] [address]   Was pt informed of any over due labs: utd

## 2024-09-18 RX ORDER — ESZOPICLONE 1 MG/1
1 TABLET, FILM COATED ORAL NIGHTLY PRN
Qty: 30 TABLET | Refills: 0 | Status: SHIPPED | OUTPATIENT
Start: 2024-09-18

## 2024-10-07 ENCOUNTER — TELEPHONE (OUTPATIENT)
Dept: INTERNAL MEDICINE CLINIC | Facility: CLINIC | Age: 74
End: 2024-10-07

## 2024-10-07 DIAGNOSIS — E66.811 CLASS 1 OBESITY DUE TO EXCESS CALORIES WITH SERIOUS COMORBIDITY AND BODY MASS INDEX (BMI) OF 33.0 TO 33.9 IN ADULT: ICD-10-CM

## 2024-10-07 DIAGNOSIS — N18.31 STAGE 3A CHRONIC KIDNEY DISEASE (HCC): ICD-10-CM

## 2024-10-07 DIAGNOSIS — E78.00 HYPERCHOLESTEROLEMIA: ICD-10-CM

## 2024-10-07 DIAGNOSIS — E03.9 ACQUIRED HYPOTHYROIDISM: Primary | ICD-10-CM

## 2024-10-07 DIAGNOSIS — E66.09 CLASS 1 OBESITY DUE TO EXCESS CALORIES WITH SERIOUS COMORBIDITY AND BODY MASS INDEX (BMI) OF 33.0 TO 33.9 IN ADULT: ICD-10-CM

## 2024-10-07 DIAGNOSIS — R73.03 PREDIABETES: ICD-10-CM

## 2024-10-07 NOTE — TELEPHONE ENCOUNTER
Patient scheduled her annual wellness visit on 12/2/24 with Dr Gibbs.  She would like to complete labs prior to the appointment. Please place the orders and call the patient.  Thank you!

## 2024-10-07 NOTE — TELEPHONE ENCOUNTER
Labs pended. Please advise Ok to order?     Future Appointments   Date Time Provider Department Center   10/15/2024  9:00 AM PF US 2 PF [facility]   10/16/2024  1:15 PM Dayana Flynn MD [facility] [address]   12/2/2024  2:00 PM Bailey Gibbs MD [facility] [address]

## 2024-10-15 ENCOUNTER — HOSPITAL ENCOUNTER (OUTPATIENT)
Dept: ULTRASOUND IMAGING | Age: 74
Discharge: HOME | End: 2024-10-15
Attending: SURGERY
Payer: MEDICARE

## 2024-10-15 DIAGNOSIS — N63.22 MASS OF UPPER INNER QUADRANT OF LEFT BREAST: ICD-10-CM

## 2024-10-15 PROCEDURE — 76641 ULTRASOUND BREAST COMPLETE: CPT | Performed by: SURGERY

## 2024-10-16 ENCOUNTER — OFFICE VISIT (OUTPATIENT)
Facility: CLINIC | Age: 74
End: 2024-10-16
Payer: MEDICARE

## 2024-10-16 DIAGNOSIS — Z85.3 HISTORY OF RIGHT BREAST CANCER: Primary | ICD-10-CM

## 2024-10-16 DIAGNOSIS — N60.02 BILATERAL BREAST CYSTS: ICD-10-CM

## 2024-10-16 DIAGNOSIS — N60.01 BILATERAL BREAST CYSTS: ICD-10-CM

## 2024-10-16 PROCEDURE — 99214 OFFICE O/P EST MOD 30 MIN: CPT | Performed by: SURGERY

## 2024-10-16 NOTE — PROGRESS NOTES
Breast Surgery Follow up    Vianney Daniel is a 74 year old patient, a previous patient of Dr. Elizabeth and Dr. Quiles, referred by Dr. Gibbs, who presents for evaluation of abnormal imaging.    History of Present Illness:   Ms. Vianney Daniel is a 74 year old woman with a past history significant for right breast cancer in 2015 s/p bilateral mastectomy with FEMI flap reconstruction who presents for breast cancer follow up and discussion of imaging results. Her pathology from surgery in 2015 showed R breast invasive ductal carcinoma, grade 3, 1.5 cm in greatest dimension. Margins were negative and 0/3 lymph nodes harbored metastatic disease ([id number]). ER positive, HER2 positive. She underwent Herceptin therapy and did have some cardiac damage. She does have family history of breast cancer. Her sister had breast cancer at age 62 (now 71). Her daughter was first diagnosed at age 46 and has had breast cancer \"3 times\" including a reported recurrence and a contralateral breast cancer. The patient does have a history of genetic testing. She has a VUS in PALB2 gene.    In October 2023 she had an ultrasound that showed a 5 mm nodule in the 1:00 position of her left breast although that resolved on follow up imaging in March 2024. On 7/8/2024 she had an ultrasound that showed multiple, bilateral, benign- appearing cysts, but then she got a letter that there was a concern and she required clinical follow up.     Today she presents after 3 month follow up ultrasound. On complete breast ultrasound from 10/15/24, the overall amount of cysts has decreased in extent and the patient does not have any new changes.     Past medical history, surgical history, family history, allergies, and social history were updated as applicable in EPIC, otherwise see prior consultation note.        Medications:    No outpatient medications have been marked as taking for the 10/16/24 encounter (Appointment) with Dayana Flynn MD.        Review of Systems:    Review of Systems   Constitutional:  Positive for fatigue. Negative for activity change, appetite change, chills and unexpected weight change.   HENT:  Positive for congestion. Negative for ear pain, hearing loss, nosebleeds, sore throat, trouble swallowing and voice change.    Eyes:  Negative for pain and visual disturbance.   Respiratory:  Positive for shortness of breath. Negative for cough and chest tightness.    Cardiovascular:  Negative for chest pain, palpitations and leg swelling.   Gastrointestinal:  Negative for nausea, vomiting, abdominal pain, diarrhea, constipation and blood in stool.   Endocrine: Negative for cold intolerance and heat intolerance.   Genitourinary:  Negative for dysuria, hematuria and difficulty urinating.   Musculoskeletal:  Positive for myalgias, back pain, joint pain and neck pain. Negative for joint swelling.   Skin:  Positive for rash. Negative for color change and wound.   Allergic/Immunologic: Negative for environmental allergies.   Neurological:  Negative for tremors, syncope, facial asymmetry, speech difficulty and weakness.   Hematological:  Negative for adenopathy. Does not bruise/bleed easily.   Psychiatric/Behavioral:  Negative for hallucinations, behavioral problems, confusion, agitation and depressed mood.       Otherwise as per HPI.    Physical Exam:    There were no vitals taken for this visit.    Physical Exam  Vitals reviewed.   Constitutional:       Appearance: Normal appearance.   HENT:      Head: Normocephalic and atraumatic.   Eyes:      Extraocular Movements: Extraocular movements intact.      Pupils: Pupils are equal, round, and reactive to light.   Cardiovascular:      Rate and Rhythm: Normal rate.   Pulmonary:      Effort: Pulmonary effort is normal.   Chest:   Breasts:     Right: Normal. No mass, nipple discharge, skin change or tenderness.      Left: Normal. No mass, nipple discharge, skin change or tenderness.       Abdominal:       General: Abdomen is flat.      Palpations: Abdomen is soft.   Musculoskeletal:         General: Normal range of motion.      Cervical back: Normal range of motion and neck supple.   Lymphadenopathy:      Upper Body:      Right upper body: No supraclavicular or axillary adenopathy.      Left upper body: No supraclavicular or axillary adenopathy.   Skin:     General: Skin is warm and dry.   Neurological:      General: No focal deficit present.      Mental Status: She is alert and oriented to person, place, and time.   Psychiatric:         Mood and Affect: Mood normal.        Bra size: 38D    Labs/imaging: reviewed in EPIC    Impression:   Ms. Vianney Daniel is a 74 year old woman that presents for follow up of right breast cancer s/p bilateral mastectomy with FEMI flap reconstruction for HEF2 + invasive ductal carcinoma ([id number]).    Discussion and Plan:  I had a discussion with the Patient regarding her breast exam. On exam today I was able to palpate small right and left breast masses that correspond to the cystic structures seen on the ultrasound. These are unchanged from previous exam. I personally reviewed her recent imaging and we discussed this.  We will order a follow up ultrasound in a year.  I reassured her that she no longer has breast tissue and I think these cysts may be just postoperative changes. She will return to the office in 1 year.     Bilateral US breast complete ordered for 10/2025    She was given ample opportunity for questions and those questions were answered to her satisfaction. She has been encouraged to contact the office with any questions or concerns prior to her next appointment. This encounter lasted a total of 40 minutes, more than 50% of which was dedicated to the discussion of management options.     Dayana Flynn MD  Breast Surgical Oncology    CC: Dr. Gibbs

## 2024-11-14 ENCOUNTER — HOSPITAL ENCOUNTER (OUTPATIENT)
Dept: CV DIAGNOSTICS | Age: 74
Discharge: HOME | End: 2024-11-14
Attending: INTERNAL MEDICINE
Payer: MEDICARE

## 2024-11-14 ENCOUNTER — LAB ENCOUNTER (OUTPATIENT)
Dept: LAB | Age: 74
End: 2024-11-14
Attending: INTERNAL MEDICINE
Payer: MEDICARE

## 2024-11-14 DIAGNOSIS — Z79.899 ENCOUNTER FOR MONITORING CARDIOTOXIC DRUG THERAPY: ICD-10-CM

## 2024-11-14 DIAGNOSIS — N18.31 STAGE 3A CHRONIC KIDNEY DISEASE (HCC): ICD-10-CM

## 2024-11-14 DIAGNOSIS — Z90.13 HISTORY OF BILATERAL MASTECTOMY: ICD-10-CM

## 2024-11-14 DIAGNOSIS — E03.9 ACQUIRED HYPOTHYROIDISM: ICD-10-CM

## 2024-11-14 DIAGNOSIS — Z51.81 ENCOUNTER FOR MONITORING CARDIOTOXIC DRUG THERAPY: ICD-10-CM

## 2024-11-14 DIAGNOSIS — R06.09 DYSPNEA ON EXERTION: Primary | ICD-10-CM

## 2024-11-14 DIAGNOSIS — Z90.13 STATUS POST BILATERAL MASTECTOMY: ICD-10-CM

## 2024-11-14 DIAGNOSIS — R73.03 PREDIABETES: ICD-10-CM

## 2024-11-14 DIAGNOSIS — Z51.81 ENCOUNTER FOR THERAPEUTIC DRUG MONITORING: ICD-10-CM

## 2024-11-14 DIAGNOSIS — E78.00 HYPERCHOLESTEROLEMIA: ICD-10-CM

## 2024-11-14 LAB
ALBUMIN SERPL-MCNC: 4.1 G/DL (ref 3.2–4.8)
ALBUMIN/GLOB SERPL: 1.3 {RATIO} (ref 1–2)
ALP LIVER SERPL-CCNC: 57 U/L
ALT SERPL-CCNC: 23 U/L
ANION GAP SERPL CALC-SCNC: 2 MMOL/L (ref 0–18)
AST SERPL-CCNC: 24 U/L (ref ?–34)
BASOPHILS # BLD AUTO: 0.04 X10(3) UL (ref 0–0.2)
BASOPHILS NFR BLD AUTO: 0.7 %
BILIRUB SERPL-MCNC: 0.4 MG/DL (ref 0.2–1.1)
BUN BLD-MCNC: 21 MG/DL (ref 9–23)
CALCIUM BLD-MCNC: 10 MG/DL (ref 8.7–10.4)
CHLORIDE SERPL-SCNC: 106 MMOL/L (ref 98–112)
CHOLEST SERPL-MCNC: 179 MG/DL (ref ?–200)
CO2 SERPL-SCNC: 30 MMOL/L (ref 21–32)
CREAT BLD-MCNC: 0.96 MG/DL
EGFRCR SERPLBLD CKD-EPI 2021: 63 ML/MIN/1.73M2 (ref 60–?)
EOSINOPHIL # BLD AUTO: 0.22 X10(3) UL (ref 0–0.7)
EOSINOPHIL NFR BLD AUTO: 3.7 %
ERYTHROCYTE [DISTWIDTH] IN BLOOD BY AUTOMATED COUNT: 13.2 %
EST. AVERAGE GLUCOSE BLD GHB EST-MCNC: 128 MG/DL (ref 68–126)
FASTING PATIENT LIPID ANSWER: NO
FASTING STATUS PATIENT QL REPORTED: NO
GLOBULIN PLAS-MCNC: 3.2 G/DL (ref 2–3.5)
GLUCOSE BLD-MCNC: 109 MG/DL (ref 70–99)
HBA1C MFR BLD: 6.1 % (ref ?–5.7)
HCT VFR BLD AUTO: 39.5 %
HDLC SERPL-MCNC: 56 MG/DL (ref 40–59)
HGB BLD-MCNC: 13.2 G/DL
IMM GRANULOCYTES # BLD AUTO: 0.01 X10(3) UL (ref 0–1)
IMM GRANULOCYTES NFR BLD: 0.2 %
LDLC SERPL CALC-MCNC: 104 MG/DL (ref ?–100)
LYMPHOCYTES # BLD AUTO: 1.69 X10(3) UL (ref 1–4)
LYMPHOCYTES NFR BLD AUTO: 28.7 %
MCH RBC QN AUTO: 30.6 PG (ref 26–34)
MCHC RBC AUTO-ENTMCNC: 33.4 G/DL (ref 31–37)
MCV RBC AUTO: 91.6 FL
MONOCYTES # BLD AUTO: 0.59 X10(3) UL (ref 0.1–1)
MONOCYTES NFR BLD AUTO: 10 %
NEUTROPHILS # BLD AUTO: 3.34 X10 (3) UL (ref 1.5–7.7)
NEUTROPHILS # BLD AUTO: 3.34 X10(3) UL (ref 1.5–7.7)
NEUTROPHILS NFR BLD AUTO: 56.7 %
NONHDLC SERPL-MCNC: 123 MG/DL (ref ?–130)
NT-PROBNP SERPL-MCNC: 275 PG/ML (ref ?–125)
OSMOLALITY SERPL CALC.SUM OF ELEC: 290 MOSM/KG (ref 275–295)
PLATELET # BLD AUTO: 291 10(3)UL (ref 150–450)
POTASSIUM SERPL-SCNC: 4.5 MMOL/L (ref 3.5–5.1)
PROT SERPL-MCNC: 7.3 G/DL (ref 5.7–8.2)
RBC # BLD AUTO: 4.31 X10(6)UL
SODIUM SERPL-SCNC: 138 MMOL/L (ref 136–145)
TRIGL SERPL-MCNC: 107 MG/DL (ref 30–149)
TROPONIN I SERPL HS-MCNC: <3 NG/L
TSI SER-ACNC: 1.12 UIU/ML (ref 0.55–4.78)
VLDLC SERPL CALC-MCNC: 18 MG/DL (ref 0–30)
WBC # BLD AUTO: 5.9 X10(3) UL (ref 4–11)

## 2024-11-14 PROCEDURE — 84484 ASSAY OF TROPONIN QUANT: CPT

## 2024-11-14 PROCEDURE — 85025 COMPLETE CBC W/AUTO DIFF WBC: CPT

## 2024-11-14 PROCEDURE — 83036 HEMOGLOBIN GLYCOSYLATED A1C: CPT

## 2024-11-14 PROCEDURE — 93306 TTE W/DOPPLER COMPLETE: CPT | Performed by: INTERNAL MEDICINE

## 2024-11-14 PROCEDURE — 84443 ASSAY THYROID STIM HORMONE: CPT

## 2024-11-14 PROCEDURE — 36415 COLL VENOUS BLD VENIPUNCTURE: CPT

## 2024-11-14 PROCEDURE — 80061 LIPID PANEL: CPT

## 2024-11-14 PROCEDURE — 80053 COMPREHEN METABOLIC PANEL: CPT

## 2024-11-14 PROCEDURE — 83880 ASSAY OF NATRIURETIC PEPTIDE: CPT

## 2024-11-14 PROCEDURE — 93356 MYOCRD STRAIN IMG SPCKL TRCK: CPT | Performed by: INTERNAL MEDICINE

## 2024-11-26 ENCOUNTER — TELEPHONE (OUTPATIENT)
Dept: INTERNAL MEDICINE CLINIC | Facility: CLINIC | Age: 74
End: 2024-11-26

## 2024-11-26 DIAGNOSIS — F51.01 PRIMARY INSOMNIA: ICD-10-CM

## 2024-11-26 RX ORDER — FLUTICASONE PROPIONATE 50 MCG
1 SPRAY, SUSPENSION (ML) NASAL 2 TIMES DAILY
Qty: 48 G | Refills: 3 | Status: SHIPPED | OUTPATIENT
Start: 2024-11-26

## 2024-11-26 RX ORDER — ESZOPICLONE 1 MG/1
1 TABLET, FILM COATED ORAL NIGHTLY PRN
Qty: 30 TABLET | Refills: 0 | Status: SHIPPED | OUTPATIENT
Start: 2024-11-26

## 2024-11-26 NOTE — TELEPHONE ENCOUNTER
Year supply of flonase sent on 6/24/24- she has refills on file. See refill te from pharmacy for Eszopiclone.    Is lisinopril managed by cardiology?

## 2024-11-26 NOTE — TELEPHONE ENCOUNTER
Patient should not be on lisinopril. She is on entresto per cardiology. Confirm what medication she needs refilled please. We can fill eszopiclone.

## 2024-11-26 NOTE — TELEPHONE ENCOUNTER
Last OV relevant to medication: 6/24/24  Last refill date: 9/18/24 #30/refills: 0  When pt was asked to return for OV: 12/1/24  Upcoming appt/reason:   Future Appointments   Date Time Provider Department Center   12/2/2024  2:00 PM Bailey Gibbs MD [facility] [address]   Was pt informed of any over due labs: utd  Lab Results   Component Value Date     (H) 11/14/2024    BUN 21 11/14/2024    BUNCREA 25.3 (H) 10/12/2020    CREATSERUM 0.96 11/14/2024    ANIONGAP 2 11/14/2024    GFR 55 (L) 08/21/2017    GFRNAA 72 02/07/2022    GFRAA 83 02/07/2022    CA 10.0 11/14/2024    OSMOCALC 290 11/14/2024    ALKPHO 57 11/14/2024    AST 24 11/14/2024    ALT 23 11/14/2024    BILT 0.4 11/14/2024    TP 7.3 11/14/2024    ALB 4.1 11/14/2024    GLOBULIN 3.2 11/14/2024     11/14/2024    K 4.5 11/14/2024     11/14/2024    CO2 30.0 11/14/2024

## 2024-11-26 NOTE — TELEPHONE ENCOUNTER
Pt has appt 12/2/24 with Dr. Gibbs  She is going to [place] on 12/4/2024    She would like her nose spray, lisinopril and Eszopiclone filled prior to appt. I don't see lisinopril filled by us since 2023.    She will have Pharmacy electronically send.

## 2024-11-27 RX ORDER — SACUBITRIL AND VALSARTAN 24; 26 MG/1; MG/1
1 TABLET, FILM COATED ORAL DAILY
COMMUNITY

## 2024-11-27 NOTE — TELEPHONE ENCOUNTER
Patient notified. Patient verbalized understanding     Future Appointments   Date Time Provider Department Center   12/2/2024  2:00 PM Bailey Gibbs MD [facility] [address]     Pt said that cardiologist changed the dose of entresto 24-26mg. Med list update.     Pt asking for motion sickness medication, meclizine for her cruise trip. Please advise

## 2024-12-02 ENCOUNTER — OFFICE VISIT (OUTPATIENT)
Dept: INTERNAL MEDICINE CLINIC | Facility: CLINIC | Age: 74
End: 2024-12-02
Payer: MEDICARE

## 2024-12-02 VITALS
WEIGHT: 186 LBS | SYSTOLIC BLOOD PRESSURE: 124 MMHG | DIASTOLIC BLOOD PRESSURE: 82 MMHG | HEART RATE: 88 BPM | HEIGHT: 62 IN | BODY MASS INDEX: 34.23 KG/M2 | RESPIRATION RATE: 20 BRPM | TEMPERATURE: 98 F | OXYGEN SATURATION: 98 %

## 2024-12-02 DIAGNOSIS — Z23 NEED FOR VACCINATION: ICD-10-CM

## 2024-12-02 DIAGNOSIS — Z00.00 ENCOUNTER FOR ANNUAL HEALTH EXAMINATION: ICD-10-CM

## 2024-12-02 DIAGNOSIS — M16.11 ARTHRITIS OF RIGHT HIP: ICD-10-CM

## 2024-12-02 DIAGNOSIS — Z85.89 HISTORY OF SQUAMOUS CELL CARCINOMA: ICD-10-CM

## 2024-12-02 DIAGNOSIS — E03.9 ACQUIRED HYPOTHYROIDISM: ICD-10-CM

## 2024-12-02 DIAGNOSIS — E55.9 VITAMIN D DEFICIENCY: ICD-10-CM

## 2024-12-02 DIAGNOSIS — N18.31 STAGE 3A CHRONIC KIDNEY DISEASE (HCC): ICD-10-CM

## 2024-12-02 DIAGNOSIS — I87.2 VENOUS INSUFFICIENCY: ICD-10-CM

## 2024-12-02 DIAGNOSIS — E78.00 HYPERCHOLESTEROLEMIA: ICD-10-CM

## 2024-12-02 DIAGNOSIS — I50.32 CHRONIC HEART FAILURE WITH PRESERVED EJECTION FRACTION (HCC): ICD-10-CM

## 2024-12-02 DIAGNOSIS — G47.33 OSA (OBSTRUCTIVE SLEEP APNEA): ICD-10-CM

## 2024-12-02 DIAGNOSIS — M85.852 OSTEOPENIA OF NECKS OF BOTH FEMURS: ICD-10-CM

## 2024-12-02 DIAGNOSIS — E66.09 CLASS 1 OBESITY DUE TO EXCESS CALORIES WITH SERIOUS COMORBIDITY AND BODY MASS INDEX (BMI) OF 33.0 TO 33.9 IN ADULT: ICD-10-CM

## 2024-12-02 DIAGNOSIS — F33.9 DEPRESSION, RECURRENT (HCC): ICD-10-CM

## 2024-12-02 DIAGNOSIS — C50.111 MALIGNANT NEOPLASM OF CENTRAL PORTION OF RIGHT BREAST IN FEMALE, ESTROGEN RECEPTOR POSITIVE (HCC): ICD-10-CM

## 2024-12-02 DIAGNOSIS — M54.32 SCIATICA OF LEFT SIDE: ICD-10-CM

## 2024-12-02 DIAGNOSIS — F51.01 PRIMARY INSOMNIA: ICD-10-CM

## 2024-12-02 DIAGNOSIS — I10 PRIMARY HYPERTENSION: ICD-10-CM

## 2024-12-02 DIAGNOSIS — Z17.0 MALIGNANT NEOPLASM OF CENTRAL PORTION OF RIGHT BREAST IN FEMALE, ESTROGEN RECEPTOR POSITIVE (HCC): ICD-10-CM

## 2024-12-02 DIAGNOSIS — M85.851 OSTEOPENIA OF NECKS OF BOTH FEMURS: ICD-10-CM

## 2024-12-02 DIAGNOSIS — E66.811 CLASS 1 OBESITY DUE TO EXCESS CALORIES WITH SERIOUS COMORBIDITY AND BODY MASS INDEX (BMI) OF 33.0 TO 33.9 IN ADULT: ICD-10-CM

## 2024-12-02 DIAGNOSIS — M85.859 OSTEOPENIA OF NECK OF FEMUR, UNSPECIFIED LATERALITY: ICD-10-CM

## 2024-12-02 DIAGNOSIS — Z00.00 ENCOUNTER FOR ANNUAL WELLNESS EXAM IN MEDICARE PATIENT: Primary | ICD-10-CM

## 2024-12-02 DIAGNOSIS — R73.03 PREDIABETES: ICD-10-CM

## 2024-12-02 PROBLEM — M25.572 CHRONIC ANKLE PAIN, BILATERAL: Status: RESOLVED | Noted: 2023-04-20 | Resolved: 2024-12-02

## 2024-12-02 PROBLEM — M25.571 CHRONIC ANKLE PAIN, BILATERAL: Status: RESOLVED | Noted: 2023-04-20 | Resolved: 2024-12-02

## 2024-12-02 PROBLEM — G89.29 CHRONIC ANKLE PAIN, BILATERAL: Status: RESOLVED | Noted: 2023-04-20 | Resolved: 2024-12-02

## 2024-12-02 PROBLEM — M21.40 FLAT FOOT: Status: RESOLVED | Noted: 2023-04-20 | Resolved: 2024-12-02

## 2024-12-02 RX ORDER — MECLIZINE HYDROCHLORIDE 25 MG/1
12.5 TABLET ORAL 3 TIMES DAILY PRN
Qty: 60 TABLET | Refills: 0 | Status: SHIPPED | OUTPATIENT
Start: 2024-12-02

## 2024-12-02 RX ORDER — LEVOTHYROXINE SODIUM 75 UG/1
75 TABLET ORAL
Qty: 90 TABLET | Refills: 3 | Status: SHIPPED | OUTPATIENT
Start: 2024-12-02

## 2024-12-02 RX ORDER — FUROSEMIDE 20 MG/1
20 TABLET ORAL DAILY PRN
Qty: 5 TABLET | Refills: 0 | Status: SHIPPED | OUTPATIENT
Start: 2024-12-02

## 2024-12-02 RX ORDER — GABAPENTIN 300 MG/1
300 CAPSULE ORAL EVERY MORNING
Qty: 90 CAPSULE | Refills: 0 | Status: SHIPPED | OUTPATIENT
Start: 2024-12-02

## 2024-12-02 NOTE — PROGRESS NOTES
Subjective:   Vianney Daniel is a 74 year old female with pmhx of HTN, HLD, hypothyroidism, prediabetes, Obesity, CKD stage 3a, JAMES, hx of breast cancer s/p chemo in remission who presents for a Medicare Subsequent Annual Wellness visit (Pt already had Initial Annual Wellness) and stable chronic illnesses (not addressed in visit).     Wt Readings from Last 6 Encounters:   12/02/24 186 lb (84.4 kg)   07/10/24 183 lb (83 kg)   06/24/24 179 lb 9.6 oz (81.5 kg)   04/15/24 187 lb (84.8 kg)   01/25/24 187 lb (84.8 kg)   12/08/23 187 lb (84.8 kg)     //Primary osteoarthritis of the hip, R   Hip replacement planned. Will return for pre-op. Will be getting replacement that does not include nickel in it due to allergies.   PLAN:   -Following with ortho.     //Motion sickness when traveling   PLAN:   Discussed options and side effects of both meclizine and scopolamine. Preferred meclizine.   -Meclizine 12.5mg PRN, can cause drowsiness. Can go up to 25mg if not drowsiness and need better control of motion sickness.     //Insomnia   //JAMES (obstructive sleep apnea)  PLAN:  Mild, positional JAMES per sleep study in 2016.Not on CPAP.   -Sleep study discussed, hold off for now. Will order if patient feels she is able to.   -Continue eszoplicone 1mg as needed. Addictive potential of medication, would not go up on dosage.     //Lumbar radiculopathy   //Moderate spinal canal stenosis at L4-L5  //Degenerative disc disease  Epidural shots for back pain and sciatica. Another one planned as she is going on cruise to [place]. Will take her cane with her.   PLAN:   MRI lumbar spine 2023 showed moderage DJD of the lumbar spine at multi levels and moderate spinal canal stenosis at L4-L5. Completed PT with not much improvement. Falling with ortho specialist.   -Recommend patient to follow-up with ortho if symptoms do not resolve for potential epidural injection as previously discussed.   -Increase gabapentin to 300mg BID as not having  drowsiness.   -Continue Lidoderm patch 12 hours on and 12 hours off.  -Continue Tylenol arthritis 650 mg TID. Max dose of tylenol is 3500 mg/day      //Hypercholesterolemia  PLAN:    6/2023. CAC score of 0 8/2022. Diet controlled. Should be on a statin due to elevated ASCVD risk. Patient refuses at this time. Did discuss CAC score does not show new plaque or soft plaque. Only shows old calcified plaques.   The 10-year ASCVD risk score (Aimee MANE, et al., 2019) is: 14.3%    Values used to calculate the score:      Age: 74 years      Sex: Female      Is Non- : No      Diabetic: No      Tobacco smoker: No      Systolic Blood Pressure: 124 mmHg      Is BP treated: Yes      HDL Cholesterol: 56 mg/dL      Total Cholesterol: 179 mg/dL    //Chronic heart failure with preserved ejection fraction (HCC)  Expresses concern that increase in entresto has been causing a decrease in her energy and is afraid of increasing dose further as recommended by Dr. Tapia. Plan is to repeat echo in 3 months to reevaluate GLS and EF and if further decreasing may need to discuss dose up titration.   PLAN:   Most recent echo with stable GLS, but slight decrease in EF from 55 to 50%.   -Follows with Dr. Brunson now.   -GLS in 6 months on new dose of entresto.     //Primary hypertension  PLAN:   At goal. Jardiance and maybe uptitrate carvedilol?   -Entresto 49-51mg BID.   -Carvedilol 3.125mg BID.     //Acquired hypothyroidism  PLAN:   TSH at goal as of 10/2023.   -Continue levothyroxine 75mcg qAM daily.   -repeat TSH ordered.     //Class 1 obesity due to excess calories with serious comorbidity and body mass index (BMI) of 33.0 to 33.9 in adult  Exercising regularly in classes. Monitoring diet.   PLAN:   CTM     //Prediabetes  PLAN:   A1c 5.8%. Stable, CTM     //Stage 3a chronic kidney disease (HCC)  PLAN:   Stable, CTM.     //Patient report of mild COPD  Denies shortness of breath, chronic cough, chest  pressure.   PLAN:  2014 PFTs:With no evidenec of airway obstruction, Restriction noted, but total lung capacity wnls. Diffusion capacity is mild decreased but correct into the normal range when alveolar lung volumes are taken into account. Discussed with patient that this does not show evidence of COPD and as she is asymptomatic no further concerns at this time.     //Osteopenia of the L femoral neck  //Osteopenia of the L total hip  PLAN:   Progression noted on recent DEXA at [facility], compared to 2021 dexa scan. FRAX of major osteoporotic fracture <20% (12.3). FRAX score of hip fracture <3% (2.8). Do not feel she needs medication at this time. Do recommend continue vitamin D supplementation and maintenance of calcium intake. She is on calcium supplementation.  -Repeat DEXA scan planned for 2026.      //Chronic leg swelling  //Venous insufficiency   PLAN:   BNP just slightly elevated. Weight is stable to baseline weight. Likely etiology is venous insufficiency. If progressive or gain more than 3lbs in 24 hour period, she is to take lasix 20mg. I have ordered 5 pills for her. She is aware if continues to gain or coupled with shortness of breath she needs to go to the ER due to her history of heart failure.     PROBLEMS NOT DISCUSSED TODAY:   //R leg contraction knots  Patient reports small knots that she can feel in her muscles on her leg. Concerned if this is something she should be worried about. Can be uncomfortable when pressed on, but otherwise no pain.   PLAN:   Exam notable for presence only with palpation during contraction of the quadricep muscle. Notable above the knee about 1cm in size and additional one located laterally on the side of the thigh as well. Some tenderness noted with palpation. Coincides with flare of sciatica. CTM. If increase in size or more visible, would consider ultrasound of the area to evaluate further. Ddx less concerning for vein pathology, mass. Most recent vein ultrasound  negative per review.     HEALTH MAINTENANCE:   -Vaccinations: Prevnar complete, Shingrix done, Flu done, COVID done  -Colonoscopy: 03/16/2018  -Mammogram: 07/05/2023  -Pap Smear: Tested out.   -Diabetes screening: Last A1c value was 5.8% done 6/5/2023.  -Lipid screening: Cholesterol: 183, done on 6/5/2023.  HDL Cholesterol: 62, done on 6/5/2023.  TriGlycerides 90, done on 6/5/2023.  LDL Cholesterol: 105, done on 6/5/2023.   -Birth Control: post menopausal   -Smoking: none  -Alcohol: rare   -Marijuana: none  -Recreational drug: none    Return in about 6 months (around 6/2/2025) for Follow-up of chronic medical conditions.    Bailey Gibbs MD  Internal Medicine     History/Other:   Fall Risk Assessment:   She has been screened for Falls and is low risk.      Cognitive Assessment:   She had a completely normal cognitive assessment - see flowsheet entries       Functional Ability/Status:   Vianney Daniel has a completely normal functional assessment. See flowsheet for details.      Depression Screening (PHQ):  PHQ-2 SCORE: 0  , done 11/27/2024   Feeling tired or having little energy: 3    Poor appetite or overeating: 3    Trouble concentrating on things, such as reading the newspaper or watching television: 2         <5 minutes spent screening and counseling for depression    Advanced Directives:   She does have a Living Will but we do NOT have it on file in Epic.    She does have a POA but we do NOT have it on file in Epic.    Discussed Advance Care Planning with patient (and family/surrogate if present). Standard forms made available to patient in After Visit Summary.      Patient Active Problem List   Diagnosis    Rosacea    Venous insufficiency    HTN (hypertension)    Neuropathy    Malignant neoplasm of central portion of right female breast-ER/Her 2 +    Vitamin D deficiency    JAMES (obstructive sleep apnea)    Acquired hypothyroidism    History of adenomatous polyp of colon    Stage 3a chronic kidney disease  (HCC)    History of basal cell cancer    History of squamous cell carcinoma    Class 1 obesity due to excess calories with serious comorbidity and body mass index (BMI) of 33.0 to 33.9 in adult    Hand dermatitis    Status post biopsy, skull lesion    Prediabetes    Arthritis of right hip    Osteopenia of necks of both femurs    Primary insomnia    Hypercholesterolemia    Bunion    Calcaneal spur    Sciatica    Chronic heart failure with preserved ejection fraction (HCC)    Depression, recurrent (HCC)     Allergies:  She is allergic to adhesive tape, bacitracin, codeine, doxycycline, levaquin [levofloxacin], neosporin [neomycin-bacitracin-polymyxin], nickel, other, pcns [penicillins], polysporin [bacitracin-polymyxin b], and soap.    Current Medications:  Outpatient Medications Marked as Taking for the 12/2/24 encounter (Office Visit) with Bailey Gibbs MD   Medication Sig    meclizine 25 MG Oral Tab Take 0.5 tablets (12.5 mg total) by mouth 3 (three) times daily as needed for Nausea.    levothyroxine 75 MCG Oral Tab Take 1 tablet (75 mcg total) by mouth before breakfast.    gabapentin 300 MG Oral Cap Take 1 capsule (300 mg total) by mouth every morning.    furosemide 20 MG Oral Tab Take 1 tablet (20 mg total) by mouth daily as needed (If gain more than 3lbs in 24 hours.).    sacubitril-valsartan 24-26 MG Oral Tab Take 1 tablet by mouth daily.    ESZOPICLONE 1 MG Oral Tab TAKE 1 TABLET(1 MG) BY MOUTH EVERY NIGHT AS NEEDED    fluticasone propionate 50 MCG/ACT Nasal Suspension 1 spray by Nasal route 2 (two) times daily.    aspirin 81 MG Oral Tab EC Take 1 tablet (81 mg total) by mouth daily.    gabapentin 300 MG Oral Cap Take 1 capsule (300 mg total) by mouth nightly.    lidocaine 5 % External Patch Place 1 patch onto the skin daily.    riboflavin 100 MG Oral Tab Vitamin B-2  100 mg tablet, [RxNorm: 711236]    carvedilol 3.125 MG Oral Tab Take 1 tablet (3.125 mg total) by mouth 2 (two) times daily with meals.     Pseudoephedrine-Acetaminophen (SM NON-ASPRIN SINUS OR) LOW ASPRIN    Ginkgo Biloba (GINKOBA OR) Take 240 mg by mouth every morning.    Omega 3-6-9 Fatty Acids (OMEGA 3-6-9 COMPLEX) Oral Cap Take 1 capsule by mouth daily.    Tart Cherry 1200 MG Oral Cap Take 1 capsule by mouth as needed.    Magnesium 500 MG Oral Tab Take 1 tablet (500 mg total) by mouth daily.    Cholecalciferol (VITAMIN D3) 5000 UNITS Oral Cap Take 1 capsule (5,000 Units total) by mouth daily.    Calcium Carb-Cholecalciferol 500-600 MG-UNIT Oral Tab Take 1 tablet by mouth daily.      Biotin 5000 MCG Oral Tab Take 1 tablet (5 mg total) by mouth daily.    Vitamin B-12 500 MCG Oral Tab Take 1 tablet (500 mcg total) by mouth daily.    Pyridoxine HCl (VITAMIN B-6) 100 MG Oral Tab Take 1 tablet (100 mg total) by mouth daily.    vitamin E 400 UNITS Oral Cap Take 1 capsule (400 Units total) by mouth daily.    Chromium-Cinnamon (CINNAMON PLUS CHROMIUM OR) Take 2,000 mg by mouth 2 (two) times a day.    Cranberry 500 MG Oral Cap Take 1 capsule by mouth 2 (two) times daily.      Lutein-Zeaxanthin 25-5 MG Oral Cap Take 1 capsule by mouth daily.    Ferrous Sulfate 325 (65 FE) MG Oral Tab Take 0.2 tablets (65 mg total) by mouth daily.    Vitamin C (VITAMIN C) 500 MG Oral Tab Take 1 tablet (500 mg total) by mouth daily.       Medical History:  She  has a past medical history of Acquired hypothyroidism (05/18/2017), Arthritis (Dont know), Arthritis of right hip (06/21/2022), Basal cell carcinoma (BCC) of right upper arm (01/20/2020), Breast cancer (HCC) (12/04/2014), Cancer (HCC), Chronic ankle pain, bilateral (04/20/2023), CKD (chronic kidney disease) stage 3, GFR 30-59 ml/min (HCC) (05/22/2018), Colon polyp, hyperplastic, Colon polyp, hyperplastic, COPD (chronic obstructive pulmonary disease) ([facility]), Essential hypertension, Flat foot (04/20/2023), Heart disease, High blood pressure, History of adenomatous polyp of colon (03/21/2018), History of squamous cell  carcinoma (2020), HTN (hypertension), Hypothyroidism, Obesity, Obstructive sleep apnea (adult) (pediatric) ([facility] 16), Onychomycosis, JAMES on CPAP (2016), Osteoarthritis, Periorbital swelling, Personal history of antineoplastic chemotherapy (2015), PONV (postoperative nausea and vomiting), Prediabetes (2022), Rosacea, Sleep apnea, Spinal stenosis of lumbar region at multiple levels (2013), Subclinical hypothyroidism (2016), Swelling of both ankles (2023), UTI (urinary tract infection), Venous insufficiency, and Visual impairment.  Surgical History:  She  has a past surgical history that includes d & c (); biopsy of breast, needle core; shoulder surg proc unlisted (Left, ); us breast biopsy (Right, 12/10/14); mri breast biopsy right (Right, 1/13/15); colonoscopy (age 57 yrs); other (Bilateral, 2/23/15); Breast reconstruction (Bilateral, 2/23/15); ; skin surgery; tonsillectomy; other surgical history (03/16/15); other surgical history (2015); other surgical history (2018); colonoscopy; colonoscopy (N/A, 3/16/2018); mastectomy right; mastectomy left; needle biopsy left; needle biopsy right; arthroscopy of joint unlisted; and other (2022).   Family History:  Her family history includes Cancer in her mother and sister; Cancer (age of onset: 46) in her daughter; Cancer (age of onset: 64) in her sister; Dementia in her mother; Diabetes in her father and maternal grandmother; Fuchs' dystrophy in her mother and sister; Glaucoma in her mother; Hypertension in her mother; No Known Problems in her daughter; Other in her maternal grandmother; heart issues in her paternal grandfather; htn in her mother; osteoporosis in her mother.  Social History:  She  reports that she has never smoked. She has never used smokeless tobacco. She reports current alcohol use. She reports that she does not use drugs.    Tobacco:  She has never smoked tobacco.    CAGE  Alcohol Screen:   CAGE screening score of 0 on 11/27/2024, showing low risk of alcohol abuse.      Patient Care Team:  Bailey Gibbs MD as PCP - General (Internal Medicine)  Mariann Araya MD as Consulting Physician (ONCOLOGY)  René Obrien MD as Consulting Physician (SURGERY, PLASTIC)  Leah Elizabeth MD as Consulting Physician (SURGERY, GENERAL)  Chloe Olivares MD (ENDOCRINOLOGY)  Radha Thakur MD (Cardiovascular Diseases)  Keiko Tejeda PT as Physical Therapist  Dell Kilpatrick MD as Surgeon (SURGERY, ORTHOPEDIC)  Tim Fountain MD as Consulting Physician (NEUROSURGERY)  Azam Barnhart MD as Consulting Physician (NEUROLOGY)  Shell Tran APRN (Nurse Practitioner Family)    Review of Systems     Negative except for what was mentioned in hpi    Objective:   Physical Exam  /82 (BP Location: Left arm, Patient Position: Sitting, Cuff Size: adult)   Pulse 88   Temp 97.8 °F (36.6 °C) (Temporal)   Resp 20   Ht 5' 2\" (1.575 m)   Wt 186 lb (84.4 kg)   SpO2 98%   BMI 34.02 kg/m²  Estimated body mass index is 34.02 kg/m² as calculated from the following:    Height as of this encounter: 5' 2\" (1.575 m).    Weight as of this encounter: 186 lb (84.4 kg).    General: No acute distress. Alert and oriented x 3.  HEENT: Ear canals clear. TMs pearly gray bilaterally. Throat without erythema or exudates.  Neck: No lymphadenopathy.  No thyromegaly.   Respiratory: Clear to auscultation bilaterally.  No wheezes, rales, or rhonchi.   Cardiovascular: RRR. No murmurs, rubs, or gallops.   Abdomen: Nondistended.    Neurologic: PERRLA. EOM intact. No focal neurological deficits.  Musculoskeletal: Full range of motion of all extremities.  No swelling noted.  Psychiatric: Appropriate mood and affect.    Medicare Hearing Assessment:   Hearing Screening    Time taken: 12/2/2024  2:13 PM  Screening Method: Finger Rub  Finger Rub Result: Pass       Assessment & Plan:   iVanney Daniel is a 74 year  old female who presents for a Medicare Assessment.     1. Encounter for annual wellness exam in Medicare patient (Primary)  2. Osteopenia of neck of femur, unspecified laterality  3. Acquired hypothyroidism  -     Levothyroxine Sodium; Take 1 tablet (75 mcg total) by mouth before breakfast.  Dispense: 90 tablet; Refill: 3  4. Need for vaccination  -     INFLUENZA VAC HIGH DOSE PRSV FREE  5. Chronic heart failure with preserved ejection fraction (HCC)  6. Malignant neoplasm of central portion of right breast in female, estrogen receptor positive (HCC)  7. Stage 3a chronic kidney disease (HCC)  8. Primary hypertension  9. Hypercholesterolemia  10. Venous insufficiency  11. Class 1 obesity due to excess calories with serious comorbidity and body mass index (BMI) of 33.0 to 33.9 in adult  12. Prediabetes  13. Vitamin D deficiency  14. Osteopenia of necks of both femurs  15. Sciatica of left side  16. Arthritis of right hip  17. History of squamous cell carcinoma  Overview:  Left cheek, January 2018  18. JAMES (obstructive sleep apnea)  Overview:  Mild, positional  19. Primary insomnia  20. Depression, recurrent (HCC)  Other orders  -     Meclizine HCl; Take 0.5 tablets (12.5 mg total) by mouth 3 (three) times daily as needed for Nausea.  Dispense: 60 tablet; Refill: 0  -     Gabapentin; Take 1 capsule (300 mg total) by mouth every morning.  Dispense: 90 capsule; Refill: 0  -     Furosemide; Take 1 tablet (20 mg total) by mouth daily as needed (If gain more than 3lbs in 24 hours.).  Dispense: 5 tablet; Refill: 0    The patient indicates understanding of these issues and agrees to the plan.  Continue with current treatment plan.  Patient reassured.  Reinforced healthy diet, lifestyle, and exercise.      Return in about 6 months (around 6/2/2025) for Follow-up of chronic medical conditions.     Bailey Gibbs MD, 12/2/2024     Supplementary Documentation:   General Health:  In the past six months, have you lost more than 10  pounds without trying?: (Patient-Rptd) 2 - No  Has your appetite been poor?: (Patient-Rptd) No  Type of Diet: (Patient-Rptd) Low Salt;Low Carb  How does the patient maintain a good energy level?: Other;Stretching  How would you describe your daily physical activity?: (Patient-Rptd) Light  How would you describe your current health state?: (Patient-Rptd) Good  How do you maintain positive mental well-being?: (Patient-Rptd) Social Interaction;Visiting Friends;Visiting Family  On a scale of 0 to 10, with 0 being no pain and 10 being severe pain, what is your pain level?: (Patient-Rptd) 5 - (Moderate)  In the past six months, have you experienced urine leakage?: (Patient-Rptd) 0-No  At any time do you feel concerned for the safety/well-being of yourself and/or your children, in your home or elsewhere?: (Patient-Rptd) No  Have you had any immunizations at another office such as Influenza, Hepatitis B, Tetanus, or Pneumococcal?: (Patient-Rptd) No    Health Maintenance   Topic Date Due    COVID-19 Vaccine (6 - 2024-25 season) 09/01/2024    Influenza Vaccine (1) 10/01/2024    Annual Physical  12/08/2024    Colorectal Cancer Screening  03/16/2028    DEXA Scan  Completed    Annual Depression Screening  Completed    Fall Risk Screening (Annual)  Completed    Pneumococcal Vaccine: 65+ Years  Completed    Zoster Vaccines  Completed

## 2024-12-03 ENCOUNTER — TELEPHONE (OUTPATIENT)
Dept: INTERNAL MEDICINE CLINIC | Facility: CLINIC | Age: 74
End: 2024-12-03

## 2024-12-03 NOTE — TELEPHONE ENCOUNTER
Can you please reach out to Dr. Chanel Echavarria office and ask based on the most recent DEXA scan is there a reason Dr. Echavarria was recommending patient see endocrinologist? She has osteopenia, slight progression, but FRAX score would not require treatment. No recent fractures either. Patient reports Dr. Echavarria wanted her to see a specialist for treatment options. Would like to clarify indications as I could discuss medication management with patient if indicated per Dr. Echavarria. Would be willing to speak on the phone as well if she is available.

## 2024-12-03 NOTE — TELEPHONE ENCOUNTER
Spoke with ortho office ([phone]). Someone from dr. Echavarria office will be calling us within the next 2 days. Awaiting call back.

## 2024-12-03 NOTE — TELEPHONE ENCOUNTER
Date of pre-op appt:  2/10/25  Provider pre-op scheduled with: Dr Gibbs   Surgeon's name:  Dr Smith   Phone #:  [phone]   Fax #:  [phone]  Surgery date:  2/24/25  Procedure/diagnosis:  right antier total hip arthroplasty   Does patient routinely see Cardiology or Pulmonology?     If yes, please inform Pt that they may need clearance from them also    Pre op form given to: Jadyn   Pre op faxed to Dr Smith

## 2024-12-03 NOTE — TELEPHONE ENCOUNTER
Spoke with Dr. Echavarria's clinical staff. Protocol at their office is to refer to bone health clinic if diagnosed with osteopenia or osteoporosis despite need for medication management or not as they provide education on prevention of osteoporosis. Per clinic, bone health specialist is no longer there at this time. We did discuss at appointment regarding importance of weight bearing exercises and dietary management for appropriate calcium intake and continuation of vitamin d supplementation. We will continue to follow-up on dexa scan in 2 years to ensure no evidence of progression of disease. At this time, does not need medication therapy.     Please relay information with Vianney. We will monitor.

## 2024-12-05 PROBLEM — N18.31 STAGE 3A CHRONIC KIDNEY DISEASE (HCC): Status: RESOLVED | Noted: 2018-05-22 | Resolved: 2024-12-05

## 2024-12-10 PROBLEM — F33.9 DEPRESSION, RECURRENT: Status: RESOLVED | Noted: 2024-12-02 | Resolved: 2024-12-10

## 2025-02-09 DIAGNOSIS — F51.01 PRIMARY INSOMNIA: ICD-10-CM

## 2025-02-12 RX ORDER — ESZOPICLONE 1 MG/1
1 TABLET, FILM COATED ORAL NIGHTLY PRN
Qty: 30 TABLET | Refills: 0 | Status: SHIPPED | OUTPATIENT
Start: 2025-02-12

## 2025-02-12 NOTE — TELEPHONE ENCOUNTER
Lunesta  Last OV relevant to medication: 12/2/24 physical  Last refill date: 11/26/24     #/refills: 30  When pt was asked to return for OV: 6 mo  Upcoming appt/reason: 2/18/25  Was pt informed of any over due labs: jenny

## 2025-02-13 ENCOUNTER — TELEPHONE (OUTPATIENT)
Dept: PHYSICAL THERAPY | Age: 75
End: 2025-02-13

## 2025-02-17 ENCOUNTER — ORDER TRANSCRIPTION (OUTPATIENT)
Dept: PHYSICAL THERAPY | Facility: HOSPITAL | Age: 75
End: 2025-02-17

## 2025-02-17 DIAGNOSIS — Z96.641 HISTORY OF TOTAL RIGHT HIP REPLACEMENT: Primary | ICD-10-CM

## 2025-02-18 ENCOUNTER — OFFICE VISIT (OUTPATIENT)
Dept: INTERNAL MEDICINE CLINIC | Facility: CLINIC | Age: 75
End: 2025-02-18
Payer: MEDICARE

## 2025-02-18 VITALS
TEMPERATURE: 98 F | DIASTOLIC BLOOD PRESSURE: 70 MMHG | HEIGHT: 62 IN | WEIGHT: 186 LBS | OXYGEN SATURATION: 99 % | HEART RATE: 90 BPM | SYSTOLIC BLOOD PRESSURE: 128 MMHG | BODY MASS INDEX: 34.23 KG/M2 | RESPIRATION RATE: 20 BRPM

## 2025-02-18 DIAGNOSIS — I10 PRIMARY HYPERTENSION: ICD-10-CM

## 2025-02-18 DIAGNOSIS — E03.9 ACQUIRED HYPOTHYROIDISM: ICD-10-CM

## 2025-02-18 DIAGNOSIS — I50.32 CHRONIC HEART FAILURE WITH PRESERVED EJECTION FRACTION (HCC): ICD-10-CM

## 2025-02-18 DIAGNOSIS — G47.33 OSA (OBSTRUCTIVE SLEEP APNEA): ICD-10-CM

## 2025-02-18 DIAGNOSIS — M16.11 ARTHRITIS OF RIGHT HIP: ICD-10-CM

## 2025-02-18 DIAGNOSIS — R73.03 PREDIABETES: ICD-10-CM

## 2025-02-18 DIAGNOSIS — E78.00 HYPERCHOLESTEROLEMIA: ICD-10-CM

## 2025-02-18 DIAGNOSIS — Z01.818 PREOP EXAM FOR INTERNAL MEDICINE: Primary | ICD-10-CM

## 2025-02-18 PROCEDURE — 99213 OFFICE O/P EST LOW 20 MIN: CPT | Performed by: STUDENT IN AN ORGANIZED HEALTH CARE EDUCATION/TRAINING PROGRAM

## 2025-02-18 PROCEDURE — G2211 COMPLEX E/M VISIT ADD ON: HCPCS | Performed by: STUDENT IN AN ORGANIZED HEALTH CARE EDUCATION/TRAINING PROGRAM

## 2025-02-18 NOTE — PROGRESS NOTES
PRE-OPERATIVE EVALUATION OFFICE NOTE  Vianney Daniel presents for preoperative evaluation for: R anterior total hip arthroplasty  Performing Physician: Dr. Smith  Date of surgery: 2/24/2025  Elective or emergency: Elective  Pre-operative forms provided: Yes  Anesthesia: General    Patient is at acceptable risk to proceed with surgery at this time without any further testing. Patient is completely asymptomatic at this time.      Hx of:   Patient Active Problem List   Diagnosis    Rosacea    Venous insufficiency    HTN (hypertension)    Neuropathy    Malignant neoplasm of central portion of right female breast-ER/Her 2 +    Vitamin D deficiency    JAMES (obstructive sleep apnea)    Acquired hypothyroidism    History of adenomatous polyp of colon    History of basal cell cancer    History of squamous cell carcinoma    Class 1 obesity due to excess calories with serious comorbidity and body mass index (BMI) of 33.0 to 33.9 in adult    Hand dermatitis    Status post biopsy, skull lesion    Prediabetes    Arthritis of right hip    Osteopenia of necks of both femurs    Primary insomnia    Hypercholesterolemia    Bunion    Calcaneal spur    Sciatica    Chronic heart failure with preserved ejection fraction (HCC)     //Primary osteoarthritis of the hip, R   Hip replacement planned. Will return for pre-op. Will be getting replacement that does not include nickel in it due to allergies.   PLAN:   -Following with ortho.     //Motion sickness when traveling   PLAN:   Discussed options and side effects of both meclizine and scopolamine. Preferred meclizine.   -Meclizine 12.5mg PRN, can cause drowsiness. Can go up to 25mg if not drowsiness and need better control of motion sickness.     //Insomnia   //JAMES (obstructive sleep apnea)  PLAN:  Mild, positional JAMES per sleep study in 2016.Not on CPAP.   -Sleep study discussed, hold off for now. Will order if patient feels she is able to.   -Continue eszoplicone 1mg as needed.  Addictive potential of medication, would not go up on dosage.     //Lumbar radiculopathy   //Moderate spinal canal stenosis at L4-L5  //Degenerative disc disease  Epidural shots for back pain and sciatica. Another one planned [place] [facility] [place] [facility] Will take her cane with her.   PLAN:   MRI lumbar spine 2023 showed moderage DJD of the lumbar spine at multi levels and moderate spinal canal stenosis at L4-L5. Completed PT with not much improvement. Falling with ortho specialist.   -Recommend patient to follow-up with ortho if symptoms do not resolve for potential epidural injection as previously discussed.   -Increase gabapentin to 300mg BID as not having drowsiness.   -Continue Lidoderm patch 12 hours on and 12 hours off.  -Continue Tylenol arthritis 650 mg TID. Max dose of tylenol is 3500 mg/day      //Hypercholesterolemia  PLAN:    6/2023. CAC score of 0 8/2022. Diet controlled. Should be on a statin due to elevated ASCVD risk. Patient refuses at this time. Did discuss CAC score does not show new plaque or soft plaque. Only shows old calcified plaques.   The 10-year ASCVD risk score (Aimee DK, et al., 2019) is: 15.1%    Values used to calculate the score:      Age: 74 years      Sex: Female      Is Non- : No      Diabetic: No      Tobacco smoker: No      Systolic Blood Pressure: 128 mmHg      Is BP treated: Yes      HDL Cholesterol: 56 mg/dL      Total Cholesterol: 179 mg/dL    //Chronic heart failure with preserved ejection fraction (HCC)  Expresses concern that increase in entresto has been causing a decrease in her energy and is afraid of increasing dose further as recommended by Dr. Tapia. Plan is to repeat echo in 3 months to reevaluate GLS and EF and if further decreasing may need to discuss dose up titration.   PLAN:   Most recent echo with stable GLS, but slight decrease in EF from 55 to 50%.   -Follows with Dr. Brunson now.   -GLS in 6 months on new dose  of entresto.     //Primary hypertension  PLAN:   At goal. Jardiance and maybe uptitrate carvedilol?   -Entresto 49-51mg BID.   -Carvedilol 3.125mg BID.     //Acquired hypothyroidism  PLAN:   TSH at goal as of 10/2023.   -Continue levothyroxine 75mcg qAM daily.   -repeat TSH ordered.     //Class 1 obesity due to excess calories with serious comorbidity and body mass index (BMI) of 33.0 to 33.9 in adult  Exercising regularly in classes. Monitoring diet.   PLAN:   CTM     //Prediabetes  PLAN:   A1c 5.8%. Stable, CTM     //Hx of stage 3a chronic kidney disease   PLAN:   Patient's creatinine was elevated with the GFR in the 50s earlier this year. Now it has normalized since entresto has been decreased in dosage. Will continue to monitor function, but suspect medication induced as it has now resolved.     //Patient report of mild COPD  Denies shortness of breath, chronic cough, chest pressure.   PLAN:  2014 PFTs:With no evidenec of airway obstruction, Restriction noted, but total lung capacity wnls. Diffusion capacity is mild decreased but correct into the normal range when alveolar lung volumes are taken into account. Discussed with patient that this does not show evidence of COPD and as she is asymptomatic no further concerns at this time.     //Osteopenia of the L femoral neck  //Osteopenia of the L total hip  PLAN:   Progression noted on recent D[facility], compared to 2021 dexa scan. FRAX of major osteoporotic fracture <20% (12.3). FRAX score of hip fracture <3% (2.8). Do not feel she needs medication at this time. Do recommend continue vitamin D supplementation and maintenance of calcium intake. She is on calcium supplementation.  -Repeat DEXA scan planned for 2026.      //Chronic leg swelling  //Venous insufficiency   PLAN:   BNP just slightly elevated. Weight is stable to baseline weight. Likely etiology is venous insufficiency. If progressive or gain more than 3lbs in 24 hour period, she is to take lasix  20mg. I have ordered 5 pills for her. She is aware if continues to gain or coupled with shortness of breath she needs to go to the ER due to her history of heart failure.     PROBLEMS NOT DISCUSSED TODAY:   //R leg contraction knots  Patient reports small knots that she can feel in her muscles on her leg. Concerned if this is something she should be worried about. Can be uncomfortable when pressed on, but otherwise no pain.   PLAN:   Exam notable for presence only with palpation during contraction of the quadricep muscle. Notable above the knee about 1cm in size and additional one located laterally on the side of the thigh as well. Some tenderness noted with palpation. Coincides with flare of sciatica. CTM. If increase in size or more visible, would consider ultrasound of the area to evaluate further. Ddx less concerning for vein pathology, mass. Most recent vein ultrasound negative per review.     HEALTH MAINTENANCE:   -Vaccinations: Prevnar complete, Shingrix done, Flu done, COVID done  -Colonoscopy: 03/16/2018  -Mammogram: 07/05/2023  -Pap Smear: Tested out.   -Diabetes screening: Last A1c value was 5.8% done 6/5/2023.  -Lipid screening: Cholesterol: 183, done on 6/5/2023.  HDL Cholesterol: 62, done on 6/5/2023.  TriGlycerides 90, done on 6/5/2023.  LDL Cholesterol: 105, done on 6/5/2023.   -Birth Control: post menopausal   -Smoking: none  -Alcohol: rare   -Marijuana: none  -Recreational drug: none    Assessment & Plan  There are no diagnoses linked to this encounter.    Vianney Daniel is a pleasant 74 year old year old who presents for pre-operative evaluation for above procedure.   Vianney Daniel has greater than 4 mets of activity.   Vianney Daniel has no active cardiac complaints, cardiac history noted as above.   Vianney Daniel has no active pulmonary complaints, pulmonary history noted as above.  Labs and imaging ordered as per pre-op recommendations by surgeon or per up-to-date;  please see chart for results.     Patient is at acceptable risk to proceed with surgery at this time without any further testing. Patient is completely asymptomatic at this time.      Follow Up: No follow-ups on file..     Bailey Gibbs MD  Internal Medicine    Cervical/neck:   - Arthritis: No  - Neck pain: No  - Difficulty with ROM: no   - Prior neck injury/surgery: no     NSAIDS/PLATELET INH: stopped 10 days ago   DIABETIC MEDICATIONS: None   JAMES: Mild and positional   Hx of VTE: Denies   BLEEDING DISORDER: Denies   LOOSE DENTITION OR DENTAL APPLIANCES: Denies   URI, COUGH, CP, FEVER: Denies     Cardiac:  - History of ischemic coronary disease: Denies   - History of heart failure: Yes, but much improved and resolved   - Heart attack in past 90 days: denies   - Chest pain in last 90 days: denies   - History of Arrhythmia: denies   - History of stroke or TIA:denies   - Diabetes/A1C: Last A1c value was 6.1% done 11/14/2024.      - Pre-op Creatinine > 2: No   - Anticoagulation/Warfarin/ASA/NOAC: Not right now   - Echo/ Stress test if available: Yes     Pulmonary:   - Smoker: Denies   - Apnea/CPAP: Denies   - Asthma/COPD: COPD present - mild   - Difficulty laying flat for extended period of time: denies   - Dyspnea/exertional: denies   - Respiratory Medications: denies     Functional Capacity:   Perform ADLs- eating, dress, toilet (1 METs)? Yes, patient can perform.   Walk up flight of steps, hill, walk ground level 3-4mph (4 METs)? Yes, patient can perform.   Perform heavy housework- scrubbing floors, move heavy furniture, climb 2 flights of stairs (4-10 METs)? Yes   Participate in strenuous sports- swimming, singles tennis, football, basketball, ski (+10 METs)? No     Vitals:    02/18/25 1501   BP: 128/70   Pulse: 90   Resp: 20   Temp: 98.2 °F (36.8 °C)   TempSrc: Temporal   SpO2: 99%   Weight: 186 lb (84.4 kg)   Height: 5' 2\" (1.575 m)     Body mass index is 34.02 kg/m².  BP Readings from Last 3 Encounters:    02/18/25 128/70   12/02/24 124/82   07/10/24 138/83     The 10-year ASCVD risk score (Aimee MANE, et al., 2019) is: 15.1%    Values used to calculate the score:      Age: 74 years      Sex: Female      Is Non- : No      Diabetic: No      Tobacco smoker: No      Systolic Blood Pressure: 128 mmHg      Is BP treated: Yes      HDL Cholesterol: 56 mg/dL      Total Cholesterol: 179 mg/dL  Objective  Blood pressure 128/70, pulse 90, temperature 98.2 °F (36.8 °C), temperature source Temporal, resp. rate 20, height 5' 2\" (1.575 m), weight 186 lb (84.4 kg), SpO2 99%.  General: No acute distress. Alert and oriented x 3.  HEENT: Ear canals clear. TMs pearly gray bilaterally.  Respiratory: Clear to auscultation bilaterally.  No wheezes, rales, or rhonchi.   Cardiovascular: RRR. No murmurs, rubs, or gallops.   Abdomen: Soft, nontender, nondistended.  Normoactive bowel sounds. No rebound tenderness  Neurologic: PERRLA. EOM intact. No focal neurological deficits.  Musculoskeletal: Full range of motion of all extremities.  No swelling noted.  Psychiatric: Appropriate mood and affect.    Reviewed Current Medications:  Current Outpatient Medications   Medication Sig Dispense Refill    ESZOPICLONE 1 MG Oral Tab TAKE 1 TABLET(1 MG) BY MOUTH EVERY NIGHT AS NEEDED 30 tablet 0    levothyroxine 75 MCG Oral Tab Take 1 tablet (75 mcg total) by mouth before breakfast. 90 tablet 3    gabapentin 300 MG Oral Cap Take 1 capsule (300 mg total) by mouth every morning. (Patient taking differently: Take 1 capsule (300 mg total) by mouth every morning. 4 tablet 300 mg 1 in the morning 1 300 mg in the afternoon or as needed 600 mg at night) 90 capsule 0    furosemide 20 MG Oral Tab Take 1 tablet (20 mg total) by mouth daily as needed (If gain more than 3lbs in 24 hours.). 5 tablet 0    sacubitril-valsartan 24-26 MG Oral Tab Take 1 tablet by mouth daily.      fluticasone propionate 50 MCG/ACT Nasal Suspension 1 spray by Nasal  route 2 (two) times daily. 48 g 3    aspirin 81 MG Oral Tab EC Take 1 tablet (81 mg total) by mouth daily.      gabapentin 300 MG Oral Cap Take 1 capsule (300 mg total) by mouth nightly.      lidocaine 5 % External Patch Place 1 patch onto the skin daily.      riboflavin 100 MG Oral Tab Vitamin B-2  100 mg tablet, [RxNorm: 865226]      carvedilol 3.125 MG Oral Tab Take 1 tablet (3.125 mg total) by mouth 2 (two) times daily with meals.      Pseudoephedrine-Acetaminophen (SM NON-ASPRIN SINUS OR) LOW ASPRIN      Ginkgo Biloba (GINKOBA OR) Take 240 mg by mouth every morning.      Omega 3-6-9 Fatty Acids (OMEGA 3-6-9 COMPLEX) Oral Cap Take 1 capsule by mouth daily.      Tart Molina 1200 MG Oral Cap Take 1 capsule by mouth as needed.      Magnesium 500 MG Oral Tab Take 1 tablet (500 mg total) by mouth daily.      Cholecalciferol (VITAMIN D3) 5000 UNITS Oral Cap Take 1 capsule (5,000 Units total) by mouth daily.      Calcium Carb-Cholecalciferol 500-600 MG-UNIT Oral Tab Take 1 tablet by mouth daily.        Biotin 5000 MCG Oral Tab Take 1 tablet (5 mg total) by mouth daily.      Vitamin B-12 500 MCG Oral Tab Take 1 tablet (500 mcg total) by mouth daily.      Pyridoxine HCl (VITAMIN B-6) 100 MG Oral Tab Take 1 tablet (100 mg total) by mouth daily.      vitamin E 400 UNITS Oral Cap Take 1 capsule (400 Units total) by mouth daily.      Chromium-Cinnamon (CINNAMON PLUS CHROMIUM OR) Take 2,000 mg by mouth 2 (two) times a day.      Cranberry 500 MG Oral Cap Take 1 capsule by mouth 2 (two) times daily.        Lutein-Zeaxanthin 25-5 MG Oral Cap Take 1 capsule by mouth daily.      Ferrous Sulfate 325 (65 FE) MG Oral Tab Take 0.2 tablets (65 mg total) by mouth daily.      Vitamin C (VITAMIN C) 500 MG Oral Tab Take 1 tablet (500 mg total) by mouth daily.         LABS:  Lab Results   Component Value Date    WBC 5.9 11/14/2024    RBC 4.31 11/14/2024    HGB 13.2 11/14/2024    HCT 39.5 11/14/2024    MCV 91.6 11/14/2024    MCH 30.6  11/14/2024    MCHC 33.4 11/14/2024    RDW 13.2 11/14/2024    .0 11/14/2024      Lab Results   Component Value Date     (H) 11/14/2024    BUN 21 11/14/2024    BUNCREA 25.3 (H) 10/12/2020    CREATSERUM 0.96 11/14/2024    ANIONGAP 2 11/14/2024    GFR 55 (L) 08/21/2017    GFRNAA 72 02/07/2022    GFRAA 83 02/07/2022    CA 10.0 11/14/2024    OSMOCALC 290 11/14/2024    ALKPHO 57 11/14/2024    AST 24 11/14/2024    ALT 23 11/14/2024    BILT 0.4 11/14/2024    TP 7.3 11/14/2024    ALB 4.1 11/14/2024    GLOBULIN 3.2 11/14/2024     11/14/2024    K 4.5 11/14/2024     11/14/2024    CO2 30.0 11/14/2024      Lab Results   Component Value Date    CHOLEST 179 11/14/2024    TRIG 107 11/14/2024    HDL 56 11/14/2024     (H) 11/14/2024    VLDL 18 11/14/2024    TCHDLRATIO 3.49 08/21/2017    NONHDLC 123 11/14/2024      Lab Results   Component Value Date    T4F 1.1 11/26/2018    TSH 1.117 11/14/2024      Lab Results   Component Value Date     (H) 11/14/2024    A1C 6.1 (H) 11/14/2024        Reviewed:  Patient Active Problem List    Diagnosis    Sciatica    Chronic heart failure with preserved ejection fraction (HCC)    Bunion    Calcaneal spur    Osteopenia of necks of both femurs    Primary insomnia    Hypercholesterolemia    Prediabetes    Arthritis of right hip    Status post biopsy, skull lesion     S/p 3/2/22: Dr. Fountain: Resection & biopsy of left frontal bone lesion      Hand dermatitis     With fissuring fingertips in winter      Class 1 obesity due to excess calories with serious comorbidity and body mass index (BMI) of 33.0 to 33.9 in adult    History of basal cell cancer     Right arm      History of squamous cell carcinoma     Left cheek, January 2018      History of adenomatous polyp of colon    Acquired hypothyroidism    JAMES (obstructive sleep apnea)     Mild, positional      Vitamin D deficiency    Neuropathy    Malignant neoplasm of central portion of right female breast-ER/Her 2 +     Rosacea    Venous insufficiency    HTN (hypertension)      Allergies[1]     Social History     Socioeconomic History    Marital status:    Tobacco Use    Smoking status: Never    Smokeless tobacco: Never    Tobacco comments:     Updated 24   Vaping Use    Vaping status: Never Used   Substance and Sexual Activity    Alcohol use: Yes     Alcohol/week: 0.0 - 1.0 standard drinks of alcohol     Comment: Social 1-2 month Never been drunk    Drug use: No   Other Topics Concern    Caffeine Concern No    Stress Concern No    Weight Concern Yes     Comment: Noom    Special Diet Yes     Comment: Noom    Exercise Yes    Seat Belt Yes      Review of Systems  All other systems negative unless otherwise stated in ROS or HPI above.   CARD TTE STRAIN W DOPPLER (FOK=25468/33027)  Transthoracic Echocardiogram    Name:Vianney Daniel    Date: 2024 :  1950 Ht:  (62in)  BP: 102 / 68  MRN:  [id number]     Age:  74years    Wt:  (183lb) HR: 78bpm  Loc:  [facility]       Gndr: F          BSA: 1.84m^2  Sonographer: Irina LOCO    Ordering:    Jase Brunson    Referring:   Radha Thakur M.D.    ----------------------------------------------------------------------------  History/Indications:   Dyspnea. Breast Cancer.  Risk factors:  Hypertension.    ----------------------------------------------------------------------------  Procedure information:  A transthoracic complete 2D study was performed.  Additional evaluation included M-mode, complete spectral Doppler, and color  Doppler.  Patient status:  Outpatient.  Location:  [facility].  Comparison was made to the study of 2024.    This was a routine study.  Transthoracic echocardiography for ventricular function evaluation and  assessment of valvular function. Image quality was adequate.    ECG rhythm:   Normal sinus    ----------------------------------------------------------------------------    Conclusions:    1. Left ventricle: The cavity size  was normal. Wall thickness was normal.     Systolic function was normal. The estimated ejection fraction was 55-60%,     by visual assessment. Wall motion is normal; there are no regional wall     motion abnormalities. The Global Longitudinal Strain (GLS) was -19.80%.  2. Right ventricle: The cavity size was normal. Systolic function was     normal.  3. Aortic valve: The valve was structurally normal. The valve was     trileaflet. There was mild regurgitation.  4. Compared to prior echocardiogram 06/2024, no change in left ventricle     function ejection fraction or GLS and right ventricle function. Aortic     regurgitation with slight progression from trivial to mild regurgitation.     Otherwise, no significant change.  Impressions:  This study is compared with previous dated 06/26/2024:  *    ----------------------------------------------------------------------------  *  Findings:  Left ventricle:  The cavity size was normal. Wall thickness was normal.  Systolic function was normal. The estimated ejection fraction was 55-60%, by  visual assessment. Wall motion is normal; there are no regional wall motion  abnormalities. The Global Longitudinal Strain (GLS) was -19.80%. Doppler  parameters are consistent with abnormal left ventricular relaxation - grade  1 diastolic dysfunction.  Left atrium:  The atrium was normal in size.  Right ventricle:  The cavity size was normal. Systolic function was normal.  Right atrium:  The atrium was normal in size.  Mitral valve:  The valve was structurally normal. Leaflet separation was  normal.  Doppler:  Transvalvular velocity was within the normal range. There  was no evidence for stenosis. There was trivial regurgitation.  Aortic valve:  The valve was structurally normal. The valve was trileaflet.  Cusp separation was normal.  Doppler:  Transvalvular velocity was within the  normal range. There was no evidence for stenosis. There was mild  regurgitation.  Tricuspid valve:  The  valve is structurally normal. Leaflet separation was  normal.  Doppler:  Transvalvular velocity was within the normal range. There  was no evidence for stenosis. There was mild regurgitation.  Pulmonic valve:   The valve is structurally normal. Cusp separation was  normal.  Doppler:  Transvalvular velocity was within the normal range. There  was no evidence for stenosis. There was no significant regurgitation.  Pericardium:   There was no pericardial effusion.  Aorta:  Aortic root: The aortic root was normal.  Ascending aorta: The ascending aorta was normal.  Pulmonary arteries:  Systolic pressure was within the normal range, estimated to be 24mm Hg.  Systemic veins:  Central venous respirophasic diameter changes are in the  normal range (>50%).    ----------------------------------------------------------------------------  Measurements     Left ventricle        Value         Ref       06/26/2024   GLS, 2D               -19.80 %      --------- -19.30   IVS thickness,    (H) 1.0    cm     0.6 - 0.9 0.6   ED, PLAX   LV ID, ED, PLAX   (L) 3.5    cm     3.8 - 5.2 4.5   LV ID, ES, PLAX       2.5    cm     2.2 - 3.5 3.2   LV PW thickness,  (H) 1.1    cm     0.6 - 0.9 0.8   ED, PLAX   IVS/LV PW ratio,      0.93          --------- 0.71   ED, PLAX   LV PW/LV ID           0.31          --------- 0.19   ratio, ED, PLAX   LV ejection           58     %      54 - 74   57   fraction   LV e', lateral    (L) 6.3    cm/sec >=10.0    5.7   LV E/e', lateral      9             <=13      9   LV e', medial         7.5    cm/sec >=7.0     7.6   LV E/e', medial       7             --------- 6   LV e', average        6.9    cm/sec --------- 6.6   LV E/e', average      8             <=14      7     Aortic root           Value         Ref       06/26/2024   Aortic root ID,       3.1    cm     2.6 - 4.0 3.1   ED     Ascending aorta       Value         Ref       06/26/2024   Ascending aorta       3.5    cm     1.9 - 3.5 3.0   ID, A-P, ED      Left atrium           Value         Ref       06/26/2024   LA ID, A-P, ES        3.1    cm     2.7 - 3.8 ----------   LA volume, ES,    (L) 20     ml     22 - 52   28   1-p A4C   LA/aortic root        1             --------- ----------   ratio     Mitral valve          Value         Ref       06/26/2024   Mitral E-wave         0.56   m/sec  --------- 0.49   peak velocity   Mitral A-wave         0.89   m/sec  --------- 0.9   peak velocity   Mitral E/A ratio,     0.6           --------- 0.5   peak     Pulmonary artery      Value         Ref       06/26/2024   PA pressure, S,       24     mm Hg  --------- ----------   DP     Tricuspid valve       Value         Ref       06/26/2024   Tricuspid regurg      2.3    m/sec  <=2.8     1.7   peak velocity   Tricuspid peak        21     mm Hg  --------- 18   RV-RA gradient     Systemic veins        Value         Ref       06/26/2024   Estimated CVP         3      mm Hg  --------- 5     Right ventricle       Value         Ref       06/26/2024   TAPSE, 2D             1.76   cm     >=1.70    1.76   RV pressure, S,       24     mm Hg  --------- 23   DP  Legend:  (L)  and  (H)  annette values outside specified reference range.    ----------------------------------------------------------------------------    Prepared and electronically signed by  Servando Natarajan  11/14/2024 17:16      Call office with any questions or seek emergency care if necessary.   Patient understands and agrees to follow directions and advice.    ----------------------------------------- PATIENT INSTRUCTIONS-----------------------------------------     There are no Patient Instructions on file for this visit.             [1]   Allergies  Allergen Reactions    Adhesive Tape RASH and ITCHING     Tacho and tacho    Bacitracin RASH and ITCHING    Codeine RASH, ITCHING and NAUSEA AND VOMITING    Doxycycline RASH and ITCHING    Levaquin [Levofloxacin] RASH and ITCHING    Neosporin [Neomycin-Bacitracin-Polymyxin] RASH  and ITCHING    Nickel RASH and ITCHING    Other RASH and ITCHING     Dermabond      SURGICAL STEEL     Pcns [Penicillins] RASH and ITCHING    Polysporin [Bacitracin-Polymyxin B] RASH and ITCHING    Soap RASH and ITCHING     gojo

## 2025-02-19 ENCOUNTER — PATIENT MESSAGE (OUTPATIENT)
Dept: INTERNAL MEDICINE CLINIC | Facility: CLINIC | Age: 75
End: 2025-02-19

## 2025-02-19 NOTE — TELEPHONE ENCOUNTER
I faxed the notes with clearance as requested to [facility], fax confirmed and placed in 's completed pre-op.

## 2025-02-21 ENCOUNTER — TELEPHONE (OUTPATIENT)
Dept: INTERNAL MEDICINE CLINIC | Facility: CLINIC | Age: 75
End: 2025-02-21

## 2025-02-21 RX ORDER — SULFAMETHOXAZOLE AND TRIMETHOPRIM 800; 160 MG/1; MG/1
1 TABLET ORAL 2 TIMES DAILY
Qty: 6 TABLET | Refills: 0 | Status: SHIPPED | OUTPATIENT
Start: 2025-02-21 | End: 2025-02-24

## 2025-02-21 NOTE — TELEPHONE ENCOUNTER
Order sent and pt informed and verbalized understanding. Her surgery is rescheduled for now for 3/17/25 so no new pre-op will be needed. She states they told her they will not do surgery without a clear ua d/t risk for infection per surgeon. I called and spoke with Citlaly MATOS at ortho office and explained from our side, she did understand, however they still do need a clean ua/cx after completing the abx. I called and left a detailed message for pt with this info. Thanks!!

## 2025-02-21 NOTE — TELEPHONE ENCOUNTER
Pt returning phone call but no one was available at the time that she called. Please reach back out to pt. Thanks!

## 2025-02-21 NOTE — TELEPHONE ENCOUNTER
Pt informed and verbalized understanding. All questions answered. Advised on probiotic as well and pt verbalized understanding.

## 2025-02-21 NOTE — TELEPHONE ENCOUNTER
There is a ua and culture in from 2/19 in [facility]. Spoke to pt, she doesn't have any symptoms such as frequency, pain with urination, discoloration, fever etc. They will reschedule her surgery, she had questions about repeating the urine test and if she needs to see us again before surgery. Advised she will need to reschedule surgery before we can determine if she will need another appointment, if it is soon it may be fine with the current pre-op. Advised we will call her back to advise on abx and repeat urine testing. Please advise, thanks!      (ABNORMAL) Culture: Urine (02/19/2025 9:18 AM CST)  Lab Results - (ABNORMAL) Culture: Urine (02/19/2025 9:18 AM CST)  Component Value Ref Range Test Method Analysis Time Performed At Pathologist Signature   Urine Culture 25,000-50,000 CFU/ml Streptococcus agalactiae (Group B) (A)   [place]  02/20/2025 6:06 PM CST [facility]       (ABNORMAL) Urinalysis with Microscopic, Reflex to Culture (02/19/2025 9:18 AM CST)  Lab Results - (ABNORMAL) Urinalysis with Microscopic, Reflex to Culture (02/19/2025 9:18 AM CST)  Component Value Ref Range Test Method Analysis Time Performed At Pathologist Signature   Color, Urine Yellow     02/19/2025 3:48 PM CST [facility]     Appearance, Urine Turbid (A)     02/19/2025 3:48 PM CST [facility]     Specific Gravity, Urine 1.031 (H) 1.005 - 1.030   02/19/2025 3:48 PM CST [facility]     pH, Urine 5.5 5.0 - 7.0   02/19/2025 3:48 PM CST [facility]     Protein, UA 50 (A) Negative, 10 , 20 mg/dL   02/19/2025 3:48 PM CST [facility]     Glucose, Urine Normal Normal mg/dL   02/19/2025 3:48 PM CST [facility]     Ketones, Urine Trace (A) Negative mg/dL   02/19/2025 3:48 PM CST [facility]     Bilirubin, Urine Negative Negative   02/19/2025 3:48 PM CST [facility]     Blood, Urine Negative Negative   02/19/2025 3:48 PM CST [facility]     Nitrite, Urine Negative Negative   02/19/2025 3:48 PM CST [facility]     Leukocyte Esterase, Urine 250 (A) Negative Julianna/uL   02/19/2025 3:48 PM CST [facility]     Urobilinogen, Urine Normal Normal mg/dL   02/19/2025 3:48 PM CST [facility]     RBC, Urine 0-2 0 - 2 /hpf   02/19/2025 3:48 PM CST [facility]     WBC, Urine 11-20 (A) 0 - 5 /hpf   02/19/2025 3:48 PM CST [facility]     Bacteria, Urine 1+ (A) /hpf   02/19/2025 3:48 PM CST [facility]     Squamous Epithelial Cells, Urine 0-5 /hpf   02/19/2025 3:48 PM CST [facility]     Transitional Epithelial Cells 0-2 (A) None /hpf   02/19/2025 3:48 PM CST [facility]     Hyaline Cast, Urine 3-5 (A) 0 - 2 /lpf   02/19/2025 3:48 PM CST [facility]     Calcium Oxalate Crystal, Urine Present (A) None /hpf   02/19/2025 3:48 PM CST [facility]

## 2025-02-21 NOTE — TELEPHONE ENCOUNTER
Please ask patient to call surgeon and ask if we complete treatment by Sunday night, would he be able to do the surgery? We can do bactrim twice a day x 3 days.

## 2025-02-21 NOTE — TELEPHONE ENCOUNTER
When I spoke to pt she did not have any symptoms. What abx do you want sent and should she repeat a ua/cx a week after finishing? Thanks!

## 2025-02-21 NOTE — TELEPHONE ENCOUNTER
Patient called because she got a call from her surgeon that they are cancelling her surgery on Monday because she has a UTI. Patient was wondering if there was anything she can do like an antibiotic? Please advise thank you!

## 2025-02-21 NOTE — TELEPHONE ENCOUNTER
This is a normal colonization. We typically do not treat this. It also very low in quantity as well. Please check if patient is having any symptoms.     Since surgeon cancelled surgery, we will go ahead and treat it. Please  patient on how to give a clean catch urine collection for next UA that surgeon orders for her surgery. If surgery scheduled in the next 30 days, then will not need to do repeat Preop unless surgeon requests.

## 2025-02-21 NOTE — TELEPHONE ENCOUNTER
Incoming (mail or fax):  fax  Received from:  Dr Smith's office  Documentation given to:  Triage incoming    Preop request with new surgery date

## 2025-02-24 NOTE — TELEPHONE ENCOUNTER
Would pt need another pre-op appointment? Now current surgery date is 3/17. Ok for addendum? Form in Dr bin for review

## 2025-03-03 ENCOUNTER — TELEPHONE (OUTPATIENT)
Dept: INTERNAL MEDICINE CLINIC | Facility: CLINIC | Age: 75
End: 2025-03-03

## 2025-03-03 NOTE — TELEPHONE ENCOUNTER
AL  Patient called because she is experiencing some swelling form her recent hip surgery and was wondering if she should be on a diuretic. The home nurse also had her start on her heart pill again today and she's wondering if it is too son. She would ask her cardiologist but he's currently on leave. Please advise thanks!        She was prescribed furosemide at one point, please advise, thanks!

## 2025-03-03 NOTE — TELEPHONE ENCOUNTER
She should restart her heart medications including entresto and carvedilol. She does have a furosemide as needed order, but before she  uses it, I would consider that her swelling is a consequence of the surgery and inflammation. Recommend she reach out to ortho first to ensure the swelling she is experiencing is normal.     If she has gained more than 3lbs in 24 hours, she can take the furosemide once.

## 2025-03-04 RX ORDER — FUROSEMIDE 20 MG/1
20 TABLET ORAL DAILY PRN
Qty: 15 TABLET | Refills: 0 | Status: SHIPPED | OUTPATIENT
Start: 2025-03-04 | End: 2025-06-09

## 2025-03-04 NOTE — TELEPHONE ENCOUNTER
Pt restarted the cardiac meds, home health rn removed banadages and site looks clean. Please see update thanks!

## 2025-03-04 NOTE — TELEPHONE ENCOUNTER
Recommend working with home nursing and ortho on guidance on how to bring swelling down. May need a compression wrap with ace bandage, but unsure if that would be the appropriate step with her recent hip replacement and stitches.     It would be ok for her to try lasix to see if it makes a difference, but she will need to make sure she stays hydrated, banana, and gatorade as it could make her leg cramps worse.

## 2025-03-04 NOTE — TELEPHONE ENCOUNTER
Ordered furosemide.     I think it would be a good idea for her to start taking the tylenol instead of hydrocodone tomorrow when pain is 3-4 and reach for ibuprofen if it is worse than that. She can use hydrocodone if the pain is greater than 7 for severe pain.

## 2025-03-13 ENCOUNTER — OFFICE VISIT (OUTPATIENT)
Dept: PHYSICAL THERAPY | Age: 75
End: 2025-03-13
Attending: ORTHOPAEDIC SURGERY
Payer: MEDICARE

## 2025-03-13 DIAGNOSIS — Z96.641 HISTORY OF TOTAL RIGHT HIP REPLACEMENT: Primary | ICD-10-CM

## 2025-03-13 PROCEDURE — 97162 PT EVAL MOD COMPLEX 30 MIN: CPT

## 2025-03-13 PROCEDURE — 97110 THERAPEUTIC EXERCISES: CPT

## 2025-03-14 NOTE — PROGRESS NOTES
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  Date of Surgery: 2/24/25     PATIENT SUMMARY   Summary of chief complaints: R hip pain and immobility post MARIA ESTHER  History of current condition: long hx of OA hip pain R LE pain, hx of spinal stenosis, sciatica   Pain level: current 5 /10, at best 4 /10, at worst 7 /10  Description of symptoms: overall improvement post op.  primary c/o pain down R LE, aggravation of nerve pain R LE to foot.   Occupation: retired   Leisure activities/Hobbies: active/exercise classes   Prior level of function: independent, active, exercises classes/[place]  Current limitations: antaligic gait, walking/standig tasks, use of assistive device, limited sleep, lifting LE for transfers, ADL's, shoes/socks assistance, 2 stairs in house  Pt goals: independence and full recovery after hip surgery  Past medical history was reviewed with Vianney.  Significant findings include:    Imaging/Tests:     Vianney swelling both ankles, HTN controlled with meds, HLD, hypothyroidism, CKD stage 3a, JAMES, hx of breast cancer s/p chemo in remission     ASSESSMENT  Vianney presents to physical therapy evaluation with primary c/o R hip pain and immobility post MARIA ESTHER. The results of the objective tests and measures show decreased mobility, ROM/strength consistent with the procedure.  Pt with long hx of pain, OA, stenosis and sciatica therefore will benefit from appropriate exercise modifications and progress may be slower, but great start to outpt PT.. Functional deficits include but are not limited to antaligic gait, walking/standig tasks, use of assistive device, limited sleep, lifting LE  for transfers, ADL's, shoes/socks assistance, 2 stairs in house. 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 Pt and PT discussed evaluation findings, pathology, POC and HEP.  Pt voiced understanding and performs HEP correctly without reported pain. Skilled Physical Therapy is medically necessary to address the above impairments and reach functional goals.    OBJECTIVE:    Musculoskeletal  Observation: dry incision  Palpation: Hypersensitivity throughout ITB/thigh.   (-) calf   Accessory Motion: N/A     Edema: Min noted consistent with procedure.  Hx of compression sock wear.    Special Tests:N/A     ROM and Strength: (* denotes performed with pain)  Hip   ROM MMT (-/5)    R L R L     Flex (L2) 90 (Gentle hip ROM assessment due to post op status) 110 2+ (gross assessment due to post op status; will cont to assess)       Ext              Abd     2+       ER 5 40         IR 10 15            Flexibility:  LE Flexibility R L     Hip Flexor         Hamstrings min restricted min restricted     ITB         Piriformis         Quads         Gastroc-soleus         Neurological:  Sensation: LE grossly intact to light touch; no N/T     Balance and Functional Mobility:  Gait: pt ambulates on level ground with assistive device; decreased step length; decreased stance phase   Balance: SLS: R 0 sec (N/A),  L 0 sec (N/A)       Today's Treatment and Response:   Pt education was provided on exam findings, treatment diagnosis, treatment plan, expectations, and prognosis.  Today's Treatment       3/13/2025   LE Treatment   Therapeutic Exercise Pt ed; reviewed precautions as noted above, cont gait with device for now, safety awareness/fall risk, pacing principles, gentle ROM  Home PT ex review  Modified prior LB ex's due to hip precautions.   Pelvic tilt, standing LB stretch with support for LB hx/symptom management  TKE 10x  Low level BKFO 20x  Heel slide 10x    Therapeutic Exercise Minutes 15   Evaluation Minutes 30   Total Time Of Timed Procedures 15   Total Time Of Service-Based Procedures 30   Total Treatment Time 45   HEP Cont Home PT program  Quad/glut sets  Heel slide  Ankle pumps  Hooklying pelvic tilt  Low level BKFO  Standing with UE support DLHR, low level march  Supported wt shifts        Patient was instructed in and issued a HEP for: Cont Home PT program  Quad/glut sets  Heel slide  Ankle pumps  Hooklying pelvic tilt  Low level BKFO  Standing with UE support DLHR, low level march  Supported wt shifts    Charges:  PT EVAL: Moderate Complexity, TE  In agreement with evaluation findings and clinical rationale, this evaluation involved MODERATE COMPLEXITY decision making due to 1-2 personal factors/comorbidities, 3 or more body structures involved/activity limitations, and evolving symptoms as documented in the evaluation.                                                                                                                PLAN OF CARE:    Goals: (to be met in 18 visits)   (I) with HEP  Improved active hip flex to 110 deg for easier don/doff shoes, transfers.  Improved hip ABD strength to 4+/5 to increase ease with standing and walking.   SLS improved to 10 sec to promote safety and decreased risk of falls on uneven surfaces.  Able to perform safe functional squat to reach floor objects.   Increase functional hip strength to enable reciprocal negotiation of flight stairs safely.       Frequency / Duration: Patient will be seen 1-2x/weekx/week or a total of 10 (10 POC)  visits over a 90 day period. Treatment will include: Gait training; Manual Therapy; Neuromuscular Re-education; Therapeutic Exercise; Therapeutic Activities; Home Exercise Program instruction    Education or treatment limitation: None   Rehab Potential: good     LEFS Score  LEFS Score: (Patient-Rptd) 18.75 % (3/12/2025  2:23 PM)      Patient/Family/Caregiver was advised of these findings,  precautions, and treatment options and has agreed to actively participate in planning and for this course of care.    Thank you for your referral. Please co-sign or sign and return this letter via fax as soon as possible to [phone]. If you have any questions, please contact me at Dept: [phone]    Sincerely,  Electronically signed by therapist: Keiko Tejeda PT  Physician's certification required: Yes  I certify the need for these services furnished under this plan of treatment and while under my care.    X___________________________________________________ Date____________________    Certification From: 3/14/2025  To:6/12/2025

## 2025-03-18 ENCOUNTER — OFFICE VISIT (OUTPATIENT)
Dept: PHYSICAL THERAPY | Age: 75
End: 2025-03-18
Attending: ORTHOPAEDIC SURGERY
Payer: MEDICARE

## 2025-03-18 PROCEDURE — 97116 GAIT TRAINING THERAPY: CPT

## 2025-03-18 PROCEDURE — 97110 THERAPEUTIC EXERCISES: CPT

## 2025-03-19 NOTE — PROGRESS NOTES
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 Referral Information:    Date of Evaluation: Req: 0, Auth: 0, Exp:     03/13/25 POC Auth Visits:  10       Today's Date   3/18/2025    Subjective  Hip overall better.  Difficulty sleeping.  Took 2 Tylenol prior to PT session.       Pain: 3/10     Objective          Hip       3/13/2025   Hip ROM/MMT   Rt Hip Flexion 90       Gentle hip ROM assessment due to post op status   Lt Hip Flexion 110   Rt Hip Flexion MMT (L2) 2+       gross assessment due to post op status; will cont to assess   Rt Hip Abduction MMT 2+   Rt Hip ER 5   Lt Hip ER 40   Rt Hip IR 10   Lt Hip IR 15            Assessment  Steady progress with functional mobility and smooth transition to cane today.  Able to put socks on without the aid for the first time. Pt notes she has always had LLI; she would like to return to chiro at some point; advised to clear with surgeon first.    Goals (to be met in 18 visits)   (I) with HEP  Improved active hip flex to 110 deg for easier don/doff shoes, transfers.  Improved hip ABD strength to 4+/5 to increase ease with standing and walking.   SLS improved to 10 sec to promote safety and decreased risk of falls on uneven surfaces.  Able to perform safe functional squat to reach floor objects.   Increase functional hip strength to enable reciprocal negotiation of flight stairs safely.           Plan  Cont per protocol, gait training gradual wean from cane    Treatment Last 4 Visits       3/18/2025   PT Treatment   Treatment Day 2          3/13/2025 3/18/2025   LE Treatment   Treatment Day  2   Therapeutic Exercise Pt ed; reviewed precautions as noted above, cont gait with device for  now, safety awareness/fall risk, pacing principles, gentle ROM  Home PT ex review  Modified prior LB ex's due to hip precautions.   Pelvic tilt, standing LB stretch with support for LB hx/symptom management  TKE 10x  Low level BKFO 20x  Heel slide 10x Glute sets/quad set 10x  Mid range BKFO 20x  Supine hip abd w A 10x2 R  Standing R hip abd modified 10x2  4\" step R LE taps 10x2  TKE 30x  STS elevated table 10x   Gait Training  Wt shifts 20x  Transition to cane ~ 200' modified step thru pattern   Therapeutic Exercise Minutes 15 35   Gait Training Minutes  10   Evaluation Minutes 30    Total Time Of Timed Procedures 15 45   Total Time Of Service-Based Procedures 30 0   Total Treatment Time 45 45   HEP Cont Home PT program  Quad/glut sets  Heel slide  Ankle pumps  Hooklying pelvic tilt  Low level BKFO  Standing with UE support DLHR, low level march  Supported wt shifts         HEP  Cont Home PT program  Quad/glut sets  Heel slide  Ankle pumps  Hooklying pelvic tilt  Low level BKFO  Standing with UE support DLHR, low level march  Supported wt shifts    Charges     TE2 Gt

## 2025-03-20 ENCOUNTER — OFFICE VISIT (OUTPATIENT)
Dept: PHYSICAL THERAPY | Age: 75
End: 2025-03-20
Attending: ORTHOPAEDIC SURGERY
Payer: MEDICARE

## 2025-03-20 PROCEDURE — 97110 THERAPEUTIC EXERCISES: CPT

## 2025-03-21 NOTE — PROGRESS NOTES
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 Referral Information:    Date of Evaluation: Req: 0, Auth: 0, Exp:     03/13/25 POC Auth Visits:  10       Today's Date   3/20/2025    Subjective  Easier getting on shoes/socks. Sore front of hip. Using the cane at home.       Pain: 3/10     Objective          Amb with cane household distances, modified step through pattern without c/o pain       Assessment  Good fannie for session today and steady functional gains.  Able to begin to wean cane in controlled home environments.    Goals (to be met in 18 visits)   (I) with HEP  Improved active hip flex to 110 deg for easier don/doff shoes, transfers.  Improved hip ABD strength to 4+/5 to increase ease with standing and walking.   SLS improved to 10 sec to promote safety and decreased risk of falls on uneven surfaces.  Able to perform safe functional squat to reach floor objects.   Increase functional hip strength to enable reciprocal negotiation of flight stairs safely.               Plan  Cont per protocol, gait training gradual wean from cane    Treatment Last 4 Visits       3/18/2025 3/20/2025   PT Treatment   Treatment Day 2 3          3/13/2025 3/18/2025 3/20/2025   LE Treatment   Treatment Day  2 3   Therapeutic Exercise Pt ed; reviewed precautions as noted above, cont gait with device for now, safety awareness/fall risk, pacing principles, gentle ROM  Home PT ex review  Modified prior LB ex's due to hip precautions.   Pelvic tilt, standing LB stretch with support for LB hx/symptom management  TKE 10x  Low level BKFO 20x  Heel slide 10x Glute sets/quad set 10x  Mid range BKFO 20x  Supine hip abd w A 10x2 R  Standing R hip abd  modified 10x2  4\" step R LE taps 10x2  TKE 30x  STS elevated table 10x Nustep 5'  Mid range BKFO 20x  -> clam progression 10x3  Supine hip abd 10x2 R   Standing R hip abd modified 10x2   4\" step R LE taps 10x2   TKE 30x   STS elevated table 10x      Gait Training  Wt shifts 20x  Transition to cane ~ 200' modified step thru pattern Amb with cane ~200'  Begin amb without device short household distances  Reviewed safety awareness   Therapeutic Exercise Minutes 15 35 40   Gait Training Minutes  10 5   Evaluation Minutes 30     Total Time Of Timed Procedures 15 45 45   Total Time Of Service-Based Procedures 30 0 0   Total Treatment Time 45 45 45   HEP Cont Home PT program  Quad/glut sets  Heel slide  Ankle pumps  Hooklying pelvic tilt  Low level BKFO  Standing with UE support DLHR, low level march  Supported wt shifts          HEP  Cont Home PT program  Quad/glut sets  Heel slide  Ankle pumps  Hooklying pelvic tilt  Low level BKFO  Standing with UE support DLHR, low level march  Supported wt shifts  clam    Charges     TE3

## 2025-03-25 ENCOUNTER — OFFICE VISIT (OUTPATIENT)
Dept: PHYSICAL THERAPY | Age: 75
End: 2025-03-25
Attending: ORTHOPAEDIC SURGERY
Payer: MEDICARE

## 2025-03-25 PROCEDURE — 97110 THERAPEUTIC EXERCISES: CPT

## 2025-03-25 NOTE — PROGRESS NOTES
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 Referral Information:    Date of Evaluation: Req: 0, Auth: 0, Exp:     03/13/25 POC Auth Visits:  10       Today's Date   3/25/2025    Subjective  Reports sciatica pain is hurting more than the hip. Reports contacted the surgeon to get cleared to return to chiro.       Pain: 3/10     Objective       Amb without device during session, stride length more equal   Hip       3/13/2025   Hip ROM/MMT   Rt Hip Flexion 90       Gentle hip ROM assessment due to post op status   Lt Hip Flexion 110   Rt Hip Flexion MMT (L2) 2+       gross assessment due to post op status; will cont to assess   Rt Hip Abduction MMT 2+   Rt Hip ER 5   Lt Hip ER 40   Rt Hip IR 10   Lt Hip IR 15            Assessment  Steady progress in PT and pt has been able to wean from cane. Good gait quality with short distances without device. Reviewed safety awareness, pacing principles and be mindful not to overdo.    Goals (to be met in 18 visits)   (I) with HEP  Improved active hip flex to 110 deg for easier don/doff shoes, transfers.  Improved hip ABD strength to 4+/5 to increase ease with standing and walking.   SLS improved to 10 sec to promote safety and decreased risk of falls on uneven surfaces.  Able to perform safe functional squat to reach floor objects.   Increase functional hip strength to enable reciprocal negotiation of flight stairs safely.                   Plan  Clam/hip abd strengthening. low level bridge. Cont per protocol, gait training gradual wean from cane    Treatment Last 4 Visits       3/18/2025 3/20/2025 3/25/2025   PT Treatment   Treatment Day 2 3 4          3/13/2025 3/18/2025 3/20/2025  3/25/2025   LE Treatment   Treatment Day  2 3 4   Therapeutic Exercise Pt ed; reviewed precautions as noted above, cont gait with device for now, safety awareness/fall risk, pacing principles, gentle ROM  Home PT ex review  Modified prior LB ex's due to hip precautions.   Pelvic tilt, standing LB stretch with support for LB hx/symptom management  TKE 10x  Low level BKFO 20x  Heel slide 10x Glute sets/quad set 10x  Mid range BKFO 20x  Supine hip abd w A 10x2 R  Standing R hip abd modified 10x2  4\" step R LE taps 10x2  TKE 30x  STS elevated table 10x Nustep 5'  Mid range BKFO 20x  -> clam progression 10x3  Supine hip abd 10x2 R   Standing R hip abd modified 10x2   4\" step R LE taps 10x2   TKE 30x   STS elevated table 10x    Nustep 5'   Mid range BKFO 20x  -> clam progression 10x3   Supine A ham stretch + gentle nerve glide ankle pump  Glute set 5x - mini bridge  10x  Supine hip abd 10x1 R   Standing R hip abd modified 10x2   P bars lat walk 4L  Standing march 20x  4\" step R LE taps 10x2 ->  4\" FSU 12x 2 HHA  STS elevated table 10x      Gait Training  Wt shifts 20x  Transition to cane ~ 200' modified step thru pattern Amb with cane ~200'  Begin amb without device short household distances  Reviewed safety awareness    Therapeutic Exercise Minutes 15 35 40 45   Gait Training Minutes  10 5    Evaluation Minutes 30      Total Time Of Timed Procedures 15 45 45 45   Total Time Of Service-Based Procedures 30 0 0 0   Total Treatment Time 45 45 45 45   HEP Cont Home PT program  Quad/glut sets  Heel slide  Ankle pumps  Hooklying pelvic tilt  Low level BKFO  Standing with UE support DLHR, low level march  Supported wt shifts   Access Code: [email or web address]  URL: [email or web address]  Date: 03/25/2025  Prepared by: Keiko Tejeda    Exercises  - Supine Ankle Pumps  - 2 x daily - 7 x weekly - 2 sets - 10 reps  - Supine Heel Slide  - 2-3 x daily - 7 x weekly - 2 sets - 10 reps - 3-5 hold  - Supine Gluteal Sets  - 1 x  daily - 7 x weekly - 2 sets - 10 reps  - Supine Quadricep Sets  - 1 x daily - 7 x weekly - 1 sets - 10 reps  - Supine Posterior Pelvic Tilt  - 1-2 x daily - 7 x weekly - 1 sets - 10 reps - 5 hold  - Standing Hip Abduction with Counter Support  - 1 x daily - 7 x weekly - 2 sets - 10 reps  - Heel Raises with Counter Support  - 1 x daily - 3 sets - 10 reps - 3 hold  - Standing March with Counter Support  - 1 x daily - 7 x weekly - 2 sets - 10 reps  - Bent Knee Fallouts  - 1-2 x daily - 7 x weekly - 2 sets - 10 reps - 3-5 hold  - Supine Hamstring Stretch  - 1 x daily - 7 x weekly - 2 sets - 10 reps  - Supine Short Arc Quad  - 1 x daily - 7 x weekly - 2 sets - 10 reps  - Supine Hip Abduction  - 1 x daily - 7 x weekly - 2 sets - 10 reps  - Clamshell  - 1 x daily - 7 x weekly - 2 sets - 10 reps - 3-5 hold  - Small Range Straight Leg Raise  - 1 x daily - 7 x weekly - 2-3 sets - 10 reps - 3 hold  - Beginner Bridge  - 1 x daily - 7 x weekly - 2 sets - 10 reps  - Sit to Stand  - 1 x daily - 7 x weekly - 10 reps        HEP  Access Code: [email or web address]  URL: [email or web address]  Date: 03/25/2025  Prepared by: Keiko Tejeda    Exercises  - Supine Ankle Pumps  - 2 x daily - 7 x weekly - 2 sets - 10 reps  - Supine Heel Slide  - 2-3 x daily - 7 x weekly - 2 sets - 10 reps - 3-5 hold  - Supine Gluteal Sets  - 1 x daily - 7 x weekly - 2 sets - 10 reps  - Supine Quadricep Sets  - 1 x daily - 7 x weekly - 1 sets - 10 reps  - Supine Posterior Pelvic Tilt  - 1-2 x daily - 7 x weekly - 1 sets - 10 reps - 5 hold  - Standing Hip Abduction with Counter Support  - 1 x daily - 7 x weekly - 2 sets - 10 reps  - Heel Raises with Counter Support  - 1 x daily - 3 sets - 10 reps - 3 hold  - Standing March with Counter Support  - 1 x daily - 7 x weekly - 2 sets - 10 reps  - Bent Knee Fallouts  - 1-2 x daily - 7 x weekly - 2 sets - 10 reps - 3-5 hold  - Supine Hamstring Stretch  - 1 x daily - 7 x weekly - 2 sets - 10 reps  - Supine  Short Arc Quad  - 1 x daily - 7 x weekly - 2 sets - 10 reps  - Supine Hip Abduction  - 1 x daily - 7 x weekly - 2 sets - 10 reps  - Clamshell  - 1 x daily - 7 x weekly - 2 sets - 10 reps - 3-5 hold  - Small Range Straight Leg Raise  - 1 x daily - 7 x weekly - 2-3 sets - 10 reps - 3 hold  - Beginner Bridge  - 1 x daily - 7 x weekly - 2 sets - 10 reps  - Sit to Stand  - 1 x daily - 7 x weekly - 10 reps    Charges     TE3

## 2025-03-27 ENCOUNTER — OFFICE VISIT (OUTPATIENT)
Dept: PHYSICAL THERAPY | Age: 75
End: 2025-03-27
Attending: ORTHOPAEDIC SURGERY
Payer: MEDICARE

## 2025-03-27 PROCEDURE — 97110 THERAPEUTIC EXERCISES: CPT

## 2025-03-27 NOTE — PROGRESS NOTES
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 Referral Information:    Date of Evaluation: Req: 0, Auth: 0, Exp:     03/13/25 POC Auth Visits:  10       Today's Date   3/27/2025    Subjective  Exercises going well. Sciatica easing up a bit. Walking around the house without the cane.       Pain: 3/10     Objective          Hip       3/13/2025   Hip ROM/MMT   Rt Hip Flexion 90       Gentle hip ROM assessment due to post op status   Lt Hip Flexion 110   Rt Hip Flexion MMT (L2) 2+       gross assessment due to post op status; will cont to assess   Rt Hip Abduction MMT 2+   Rt Hip ER 5   Lt Hip ER 40   Rt Hip IR 10   Lt Hip IR 15          Assessment  Steady progress in PT; weaning cane completely.    Goals (to be met in 18 visits)   (I) with HEP  Improved active hip flex to 110 deg for easier don/doff shoes, transfers.  Improved hip ABD strength to 4+/5 to increase ease with standing and walking.   SLS improved to 10 sec to promote safety and decreased risk of falls on uneven surfaces.  Able to perform safe functional squat to reach floor objects.   Increase functional hip strength to enable reciprocal negotiation of flight stairs safely.                       Plan  Gradual strengthening as fannie. Step backs. Cont per protocol, gait training gradual wean from cane    Treatment Last 4 Visits  Treatment Day: 5       3/18/2025 3/20/2025 3/25/2025 3/27/2025   LE Treatment   Therapeutic Exercise Glute sets/quad set 10x  Mid range BKFO 20x  Supine hip abd w A 10x2 R  Standing R hip abd modified 10x2  4\" step R LE taps 10x2  TKE 30x  STS elevated table 10x Nustep 5'  Mid range BKFO 20x  -> clam progression 10x3  Supine hip abd 10x2 R    Standing R hip abd modified 10x2   4\" step R LE taps 10x2   TKE 30x   STS elevated table 10x    Nustep 5'   Mid range BKFO 20x  -> clam progression 10x3   Supine A ham stretch + gentle nerve glide ankle pump  Glute set 5x - mini bridge  10x  Supine hip abd 10x1 R   Standing R hip abd modified 10x2   P bars lat walk 4L  Standing march 20x  4\" step R LE taps 10x2 ->  4\" FSU 12x 2 HHA  STS elevated table 10x    Nustep 5'   Mid range BKFO 20x  -> clam 10x3   Supine A ham stretch + gentle nerve glide ankle pump 20x  mini bridge  10x3   Standing R hip abd modified 10x3   P bars lat walk 4L    4\" FSU 20x HHA   STS elevated table 15x   Pelvic tilt 10x     Gait Training Wt shifts 20x  Transition to cane ~ 200' modified step thru pattern Amb with cane ~200'  Begin amb without device short household distances  Reviewed safety awareness  Amb without device cues for incr L step length   Therapeutic Exercise Minutes 35 40 45 40   Gait Training Minutes 10 5  5   Total Time Of Timed Procedures 45 45 45 45   Total Time Of Service-Based Procedures 0 0 0 0   Total Treatment Time 45 45 45 45   HEP   Access Code: [email or web address]  URL: [email or web address]  Date: 03/25/2025  Prepared by: Keiko Tejeda    Exercises  - Supine Ankle Pumps  - 2 x daily - 7 x weekly - 2 sets - 10 reps  - Supine Heel Slide  - 2-3 x daily - 7 x weekly - 2 sets - 10 reps - 3-5 hold  - Supine Gluteal Sets  - 1 x daily - 7 x weekly - 2 sets - 10 reps  - Supine Quadricep Sets  - 1 x daily - 7 x weekly - 1 sets - 10 reps  - Supine Posterior Pelvic Tilt  - 1-2 x daily - 7 x weekly - 1 sets - 10 reps - 5 hold  - Standing Hip Abduction with Counter Support  - 1 x daily - 7 x weekly - 2 sets - 10 reps  - Heel Raises with Counter Support  - 1 x daily - 3 sets - 10 reps - 3 hold  - Standing March with Counter Support  - 1 x daily - 7 x weekly - 2 sets - 10 reps  - Bent Knee Fallouts  - 1-2 x daily - 7 x weekly - 2 sets - 10 reps - 3-5 hold  - Supine  Hamstring Stretch  - 1 x daily - 7 x weekly - 2 sets - 10 reps  - Supine Short Arc Quad  - 1 x daily - 7 x weekly - 2 sets - 10 reps  - Supine Hip Abduction  - 1 x daily - 7 x weekly - 2 sets - 10 reps  - Clamshell  - 1 x daily - 7 x weekly - 2 sets - 10 reps - 3-5 hold  - Small Range Straight Leg Raise  - 1 x daily - 7 x weekly - 2-3 sets - 10 reps - 3 hold  - Beginner Bridge  - 1 x daily - 7 x weekly - 2 sets - 10 reps  - Sit to Stand  - 1 x daily - 7 x weekly - 10 reps         HEP  Access Code: [email or web address]  URL: [email or web address]  Date: 03/25/2025  Prepared by: Keiko Tejeda    Exercises  - Supine Ankle Pumps  - 2 x daily - 7 x weekly - 2 sets - 10 reps  - Supine Heel Slide  - 2-3 x daily - 7 x weekly - 2 sets - 10 reps - 3-5 hold  - Supine Gluteal Sets  - 1 x daily - 7 x weekly - 2 sets - 10 reps  - Supine Quadricep Sets  - 1 x daily - 7 x weekly - 1 sets - 10 reps  - Supine Posterior Pelvic Tilt  - 1-2 x daily - 7 x weekly - 1 sets - 10 reps - 5 hold  - Standing Hip Abduction with Counter Support  - 1 x daily - 7 x weekly - 2 sets - 10 reps  - Heel Raises with Counter Support  - 1 x daily - 3 sets - 10 reps - 3 hold  - Standing March with Counter Support  - 1 x daily - 7 x weekly - 2 sets - 10 reps  - Bent Knee Fallouts  - 1-2 x daily - 7 x weekly - 2 sets - 10 reps - 3-5 hold  - Supine Hamstring Stretch  - 1 x daily - 7 x weekly - 2 sets - 10 reps  - Supine Short Arc Quad  - 1 x daily - 7 x weekly - 2 sets - 10 reps  - Supine Hip Abduction  - 1 x daily - 7 x weekly - 2 sets - 10 reps  - Clamshell  - 1 x daily - 7 x weekly - 2 sets - 10 reps - 3-5 hold  - Small Range Straight Leg Raise  - 1 x daily - 7 x weekly - 2-3 sets - 10 reps - 3 hold  - Beginner Bridge  - 1 x daily - 7 x weekly - 2 sets - 10 reps  - Sit to Stand  - 1 x daily - 7 x weekly - 10 reps    Charges     TE3

## 2025-04-01 ENCOUNTER — OFFICE VISIT (OUTPATIENT)
Dept: PHYSICAL THERAPY | Age: 75
End: 2025-04-01
Attending: ORTHOPAEDIC SURGERY
Payer: MEDICARE

## 2025-04-01 PROCEDURE — 97110 THERAPEUTIC EXERCISES: CPT

## 2025-04-02 NOTE — PROGRESS NOTES
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 Referral Information:    Date of Evaluation: Req: 0, Auth: 0, Exp:     03/13/25 POC Auth Visits:  10       Today's Date   4/1/2025    Subjective  Sciatica is easing       Pain: 3/10     Objective       Amb without device household distances       Assessment  fannie session well with steady progress with exercises. Initiated light DL press without complaints.   Noted significantly improved gait quality vs pre op.    Goals (to be met in 18 visits)   (I) with HEP  Improved active hip flex to 110 deg for easier don/doff shoes, transfers.  Improved hip ABD strength to 4+/5 to increase ease with standing and walking.   SLS improved to 10 sec to promote safety and decreased risk of falls on uneven surfaces.  Able to perform safe functional squat to reach floor objects.   Increase functional hip strength to enable reciprocal negotiation of flight stairs safely.                           Plan  stair training. Gradual strengthening as fannie. Step backs. Cont per protocol, gait training gradual wean from cane    Treatment Last 4 Visits  Treatment Day: 6       3/20/2025 3/25/2025 3/27/2025 4/1/2025   LE Treatment   Therapeutic Exercise Nustep 5'  Mid range BKFO 20x  -> clam progression 10x3  Supine hip abd 10x2 R   Standing R hip abd modified 10x2   4\" step R LE taps 10x2   TKE 30x   STS elevated table 10x    Nustep 5'   Mid range BKFO 20x  -> clam progression 10x3   Supine A ham stretch + gentle nerve glide ankle pump  Glute set 5x - mini bridge  10x  Supine hip abd 10x1 R   Standing R hip abd modified 10x2   P bars lat walk 4L  Standing march 20x  4\" step R LE taps 10x2 ->  4\" FSU 12x 2 HHA  STS  elevated table 10x    Nustep 5'   Mid range BKFO 20x  -> clam 10x3   Supine A ham stretch + gentle nerve glide ankle pump 20x  mini bridge  10x3   Standing R hip abd modified 10x3   P bars lat walk 4L    4\" FSU 20x HHA   STS elevated table 15x   Pelvic tilt 10x   Nustep 5'  Standing hip abd 20x each  P bars lat walk 6L  Shuttle light DL 3 bands 40x  Bridge 20x  Sidelying hip abd 10x4  SB DKTC 20x  SB mini bridge 20x  STS elevated 10x   Gait Training Amb with cane ~200'  Begin amb without device short household distances  Reviewed safety awareness  Amb without device cues for incr L step length    Therapeutic Exercise Minutes 40 45 40 40   Gait Training Minutes 5  5    Total Time Of Timed Procedures 45 45 45 40   Total Time Of Service-Based Procedures 0 0 0 0   Total Treatment Time 45 45 45 40   HEP  Access Code: [email or web address]  URL: [email or web address]  Date: 03/25/2025  Prepared by: Keiko Tejeda    Exercises  - Supine Ankle Pumps  - 2 x daily - 7 x weekly - 2 sets - 10 reps  - Supine Heel Slide  - 2-3 x daily - 7 x weekly - 2 sets - 10 reps - 3-5 hold  - Supine Gluteal Sets  - 1 x daily - 7 x weekly - 2 sets - 10 reps  - Supine Quadricep Sets  - 1 x daily - 7 x weekly - 1 sets - 10 reps  - Supine Posterior Pelvic Tilt  - 1-2 x daily - 7 x weekly - 1 sets - 10 reps - 5 hold  - Standing Hip Abduction with Counter Support  - 1 x daily - 7 x weekly - 2 sets - 10 reps  - Heel Raises with Counter Support  - 1 x daily - 3 sets - 10 reps - 3 hold  - Standing March with Counter Support  - 1 x daily - 7 x weekly - 2 sets - 10 reps  - Bent Knee Fallouts  - 1-2 x daily - 7 x weekly - 2 sets - 10 reps - 3-5 hold  - Supine Hamstring Stretch  - 1 x daily - 7 x weekly - 2 sets - 10 reps  - Supine Short Arc Quad  - 1 x daily - 7 x weekly - 2 sets - 10 reps  - Supine Hip Abduction  - 1 x daily - 7 x weekly - 2 sets - 10 reps  - Clamshell  - 1 x daily - 7 x weekly - 2 sets - 10 reps - 3-5 hold  - Small Range  Straight Leg Raise  - 1 x daily - 7 x weekly - 2-3 sets - 10 reps - 3 hold  - Beginner Bridge  - 1 x daily - 7 x weekly - 2 sets - 10 reps  - Sit to Stand  - 1 x daily - 7 x weekly - 10 reps          HEP  Access Code: [email or web address]  URL: [email or web address]  Date: 03/25/2025  Prepared by: Keiko Tejeda    Exercises  - Supine Ankle Pumps  - 2 x daily - 7 x weekly - 2 sets - 10 reps  - Supine Heel Slide  - 2-3 x daily - 7 x weekly - 2 sets - 10 reps - 3-5 hold  - Supine Gluteal Sets  - 1 x daily - 7 x weekly - 2 sets - 10 reps  - Supine Quadricep Sets  - 1 x daily - 7 x weekly - 1 sets - 10 reps  - Supine Posterior Pelvic Tilt  - 1-2 x daily - 7 x weekly - 1 sets - 10 reps - 5 hold  - Standing Hip Abduction with Counter Support  - 1 x daily - 7 x weekly - 2 sets - 10 reps  - Heel Raises with Counter Support  - 1 x daily - 3 sets - 10 reps - 3 hold  - Standing March with Counter Support  - 1 x daily - 7 x weekly - 2 sets - 10 reps  - Bent Knee Fallouts  - 1-2 x daily - 7 x weekly - 2 sets - 10 reps - 3-5 hold  - Supine Hamstring Stretch  - 1 x daily - 7 x weekly - 2 sets - 10 reps  - Supine Short Arc Quad  - 1 x daily - 7 x weekly - 2 sets - 10 reps  - Supine Hip Abduction  - 1 x daily - 7 x weekly - 2 sets - 10 reps  - Clamshell  - 1 x daily - 7 x weekly - 2 sets - 10 reps - 3-5 hold  - Small Range Straight Leg Raise  - 1 x daily - 7 x weekly - 2-3 sets - 10 reps - 3 hold  - Beginner Bridge  - 1 x daily - 7 x weekly - 2 sets - 10 reps  - Sit to Stand  - 1 x daily - 7 x weekly - 10 reps    Charges     TE3

## 2025-04-03 ENCOUNTER — OFFICE VISIT (OUTPATIENT)
Dept: PHYSICAL THERAPY | Age: 75
End: 2025-04-03
Attending: ORTHOPAEDIC SURGERY
Payer: MEDICARE

## 2025-04-03 PROCEDURE — 97110 THERAPEUTIC EXERCISES: CPT

## 2025-04-03 NOTE — PROGRESS NOTES
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 Referral Information:    Date of Evaluation: Req: 0, Auth: 0, Exp:     03/13/25 POC Auth Visits:  10       Today's Date   4/3/2025    Subjective  Walked more after Tues visit, went to a couple stores with the cart, pleased was able to do it, not sore.       Pain: 3/10     Objective  full supine positioning with comfortable hip positioning; amb during session without device          Assessment  Steady progress with gradual increased activity; reminded pt not to overdo.  Beginning 6\" stair training with HHA.    Goals (to be met in 18 visits)   (I) with HEP  Improved active hip flex to 110 deg for easier don/doff shoes, transfers.  Improved hip ABD strength to 4+/5 to increase ease with standing and walking.   SLS improved to 10 sec to promote safety and decreased risk of falls on uneven surfaces.  Able to perform safe functional squat to reach floor objects.   Increase functional hip strength to enable reciprocal negotiation of flight stairs safely.                               Plan  Cont per POC, stair training, light strengthening.    Treatment Last 4 Visits  Treatment Day: 7       3/25/2025 3/27/2025 4/1/2025 4/3/2025   LE Treatment   Therapeutic Exercise Nustep 5'   Mid range BKFO 20x  -> clam progression 10x3   Supine A ham stretch + gentle nerve glide ankle pump  Glute set 5x - mini bridge  10x  Supine hip abd 10x1 R   Standing R hip abd modified 10x2   P bars lat walk 4L  Standing march 20x  4\" step R LE taps 10x2 ->  4\" FSU 12x 2 HHA  STS elevated table 10x    Nustep 5'   Mid range BKFO 20x  -> clam 10x3   Supine A ham stretch + gentle nerve glide ankle pump 20x  mini bridge   10x3   Standing R hip abd modified 10x3   P bars lat walk 4L    4\" FSU 20x HHA   STS elevated table 15x   Pelvic tilt 10x   Nustep 5'  Standing hip abd 20x each  P bars lat walk 6L  Shuttle light DL 3 bands 40x  Bridge 20x  Sidelying hip abd 10x4  SB DKTC 20x  SB mini bridge 20x  STS elevated 10x Nustep 5'   Standing hip abd 20x each   P bars lat walk 6L   Shuttle light DL 4 bands 40x   6\" FSU 10x2   Bridge 20x   Standing step back modified range alt 10x2  Sidelying hip abd 10x2   SB DKTC 20x   SB mini bridge 20x   STS elevated 10x ->and lowered range 10x     Gait Training  Amb without device cues for incr L step length     Therapeutic Exercise Minutes 45 40 40 43   Gait Training Minutes  5     Total Time Of Timed Procedures 45 45 40 43   Total Time Of Service-Based Procedures 0 0 0 0   Total Treatment Time 45 45 40 43   HEP Access Code: [email or web address]  URL: [email or web address]  Date: 03/25/2025  Prepared by: Keiko Tejeda    Exercises  - Supine Ankle Pumps  - 2 x daily - 7 x weekly - 2 sets - 10 reps  - Supine Heel Slide  - 2-3 x daily - 7 x weekly - 2 sets - 10 reps - 3-5 hold  - Supine Gluteal Sets  - 1 x daily - 7 x weekly - 2 sets - 10 reps  - Supine Quadricep Sets  - 1 x daily - 7 x weekly - 1 sets - 10 reps  - Supine Posterior Pelvic Tilt  - 1-2 x daily - 7 x weekly - 1 sets - 10 reps - 5 hold  - Standing Hip Abduction with Counter Support  - 1 x daily - 7 x weekly - 2 sets - 10 reps  - Heel Raises with Counter Support  - 1 x daily - 3 sets - 10 reps - 3 hold  - Standing March with Counter Support  - 1 x daily - 7 x weekly - 2 sets - 10 reps  - Bent Knee Fallouts  - 1-2 x daily - 7 x weekly - 2 sets - 10 reps - 3-5 hold  - Supine Hamstring Stretch  - 1 x daily - 7 x weekly - 2 sets - 10 reps  - Supine Short Arc Quad  - 1 x daily - 7 x weekly - 2 sets - 10 reps  - Supine Hip Abduction  - 1 x daily - 7 x weekly - 2 sets - 10 reps  - Clamshell  - 1 x daily - 7 x weekly - 2 sets - 10 reps - 3-5  hold  - Small Range Straight Leg Raise  - 1 x daily - 7 x weekly - 2-3 sets - 10 reps - 3 hold  - Beginner Bridge  - 1 x daily - 7 x weekly - 2 sets - 10 reps  - Sit to Stand  - 1 x daily - 7 x weekly - 10 reps           HEP  Access Code: [email or web address]  URL: [email or web address]  Date: 03/25/2025  Prepared by: Keiko Tejeda    Exercises  - Supine Ankle Pumps  - 2 x daily - 7 x weekly - 2 sets - 10 reps  - Supine Heel Slide  - 2-3 x daily - 7 x weekly - 2 sets - 10 reps - 3-5 hold  - Supine Gluteal Sets  - 1 x daily - 7 x weekly - 2 sets - 10 reps  - Supine Quadricep Sets  - 1 x daily - 7 x weekly - 1 sets - 10 reps  - Supine Posterior Pelvic Tilt  - 1-2 x daily - 7 x weekly - 1 sets - 10 reps - 5 hold  - Standing Hip Abduction with Counter Support  - 1 x daily - 7 x weekly - 2 sets - 10 reps  - Heel Raises with Counter Support  - 1 x daily - 3 sets - 10 reps - 3 hold  - Standing March with Counter Support  - 1 x daily - 7 x weekly - 2 sets - 10 reps  - Bent Knee Fallouts  - 1-2 x daily - 7 x weekly - 2 sets - 10 reps - 3-5 hold  - Supine Hamstring Stretch  - 1 x daily - 7 x weekly - 2 sets - 10 reps  - Supine Short Arc Quad  - 1 x daily - 7 x weekly - 2 sets - 10 reps  - Supine Hip Abduction  - 1 x daily - 7 x weekly - 2 sets - 10 reps  - Clamshell  - 1 x daily - 7 x weekly - 2 sets - 10 reps - 3-5 hold  - Small Range Straight Leg Raise  - 1 x daily - 7 x weekly - 2-3 sets - 10 reps - 3 hold  - Beginner Bridge  - 1 x daily - 7 x weekly - 2 sets - 10 reps  - Sit to Stand  - 1 x daily - 7 x weekly - 10 reps    Charges     TE3

## 2025-04-08 ENCOUNTER — OFFICE VISIT (OUTPATIENT)
Dept: PHYSICAL THERAPY | Age: 75
End: 2025-04-08
Attending: ORTHOPAEDIC SURGERY
Payer: MEDICARE

## 2025-04-08 PROCEDURE — 97110 THERAPEUTIC EXERCISES: CPT

## 2025-04-08 NOTE — PROGRESS NOTES
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 Referral Information:    Date of Evaluation: Req: 0, Auth: 0, Exp:     03/13/25 POC Auth Visits:  10       Today's Date   4/8/2025    Subjective  Doing the 2 steps to enter the house.  Has not attempted full flight yet; cautious with safety.       Pain: 3/10     Objective          Hip       3/13/2025   Hip ROM/MMT   Rt Hip Flexion 90       Gentle hip ROM assessment due to post op status   Lt Hip Flexion 110   Rt Hip Flexion MMT (L2) 2+       gross assessment due to post op status; will cont to assess   Rt Hip Abduction MMT 2+   Rt Hip ER 5   Lt Hip ER 40   Rt Hip IR 10   Lt Hip IR 15            Assessment  Excellent progress post MARIA ESTHER.Pt is highly motivatedand has hx of overdoingactivity;have continued to review pacing;pt is following through effectively.    Goals (to be met in 18 visits)   (I) with HEP  Improved active hip flex to 110 deg for easier don/doff shoes, transfers.  Improved hip ABD strength to 4+/5 to increase ease with standing and walking.   SLS improved to 10 sec to promote safety and decreased risk of falls on uneven surfaces.  Able to perform safe functional squat to reach floor objects.   Increase functional hip strength to enable reciprocal negotiation of flight stairs safely.                                   Plan  Cont per POC, stair training, light strengthening.    Treatment Last 4 Visits  Treatment Day: 8       3/27/2025 4/1/2025 4/3/2025 4/8/2025   LE Treatment   Therapeutic Exercise Nustep 5'   Mid range BKFO 20x  -> clam 10x3   Supine A ham stretch + gentle nerve glide ankle pump 20x  mini bridge  10x3   Standing R hip abd modified 10x3   P bars lat walk  4L    4\" FSU 20x HHA   STS elevated table 15x   Pelvic tilt 10x   Nustep 5'  Standing hip abd 20x each  P bars lat walk 6L  Shuttle light DL 3 bands 40x  Bridge 20x  Sidelying hip abd 10x4  SB DKTC 20x  SB mini bridge 20x  STS elevated 10x Nustep 5'   Standing hip abd 20x each   P bars lat walk 6L   Shuttle light DL 4 bands 40x   6\" FSU 10x2   Bridge 20x   Standing step back modified range alt 10x2  Sidelying hip abd 10x2   SB DKTC 20x   SB mini bridge 20x   STS elevated 10x ->and lowered range 10x   Nustep 5'   Standing hip abd 20x each   P bars lat walk 6L   Shuttle light DL 4 bands 40x   6\" FSU 10x2   Bridge 20x   Standing step back modified range alt 10x2   Supine hip abd 10x2 -> progression to SLR 3 reps (I)/ with assist 5x  Pelvic tilt and LTR 2'     Gait Training Amb without device cues for incr L step length      Therapeutic Exercise Minutes 40 40 43 45   Gait Training Minutes 5      Total Time Of Timed Procedures 45 40 43 45   Total Time Of Service-Based Procedures 0 0 0 0   Total Treatment Time 45 40 43 45        HEP  Access Code: [email or web address]  URL: [email or web address]  Date: 03/25/2025  Prepared by: Keiko Tejeda    Exercises  - Supine Ankle Pumps  - 2 x daily - 7 x weekly - 2 sets - 10 reps  - Supine Heel Slide  - 2-3 x daily - 7 x weekly - 2 sets - 10 reps - 3-5 hold  - Supine Gluteal Sets  - 1 x daily - 7 x weekly - 2 sets - 10 reps  - Supine Quadricep Sets  - 1 x daily - 7 x weekly - 1 sets - 10 reps  - Supine Posterior Pelvic Tilt  - 1-2 x daily - 7 x weekly - 1 sets - 10 reps - 5 hold  - Standing Hip Abduction with Counter Support  - 1 x daily - 7 x weekly - 2 sets - 10 reps  - Heel Raises with Counter Support  - 1 x daily - 3 sets - 10 reps - 3 hold  - Standing March with Counter Support  - 1 x daily - 7 x weekly - 2 sets - 10 reps  - Bent Knee Fallouts  - 1-2 x daily - 7 x weekly - 2 sets - 10 reps - 3-5 hold  - Supine Hamstring Stretch  - 1 x daily - 7 x weekly - 2 sets - 10  reps  - Supine Short Arc Quad  - 1 x daily - 7 x weekly - 2 sets - 10 reps  - Supine Hip Abduction  - 1 x daily - 7 x weekly - 2 sets - 10 reps  - Clamshell  - 1 x daily - 7 x weekly - 2 sets - 10 reps - 3-5 hold  - Small Range Straight Leg Raise  - 1 x daily - 7 x weekly - 2-3 sets - 10 reps - 3 hold  - Beginner Bridge  - 1 x daily - 7 x weekly - 2 sets - 10 reps  - Sit to Stand  - 1 x daily - 7 x weekly - 10 reps    Charges     TE3

## 2025-04-10 ENCOUNTER — OFFICE VISIT (OUTPATIENT)
Dept: PHYSICAL THERAPY | Age: 75
End: 2025-04-10
Attending: ORTHOPAEDIC SURGERY
Payer: MEDICARE

## 2025-04-10 PROCEDURE — 97112 NEUROMUSCULAR REEDUCATION: CPT

## 2025-04-10 PROCEDURE — 97110 THERAPEUTIC EXERCISES: CPT

## 2025-04-11 NOTE — PROGRESS NOTES
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 Referral Information:    Date of Evaluation: Req: 0, Auth: 0, Exp:     03/13/25 POC Auth Visits:  10       Today's Date   4/10/2025    Subjective  sciatica overall better but still with painful days. Last night increased pain.       Pain: 3/10     Objective       Hip       3/13/2025 4/10/2025   Hip ROM/MMT   Rt Hip Flexion 90       Gentle hip ROM assessment due to post op status 100   Lt Hip Flexion 110    Rt Hip Flexion MMT (L2) 2+       gross assessment due to post op status; will cont to assess    Rt Hip Abduction MMT 2+    Rt Hip ER 5 25   Lt Hip ER 40    Rt Hip IR 10 10   Lt Hip IR 15             Assessment  R anterior knee pain with step ups today; modified as needed to avoid aggravation of pain.  Able to add stationary bike today and fannie well.    Goals (to be met in 18 visits)   (I) with HEP  Improved active hip flex to 110 deg for easier don/doff shoes, transfers.  Improved hip ABD strength to 4+/5 to increase ease with standing and walking.   SLS improved to 10 sec to promote safety and decreased risk of falls on uneven surfaces.  Able to perform safe functional squat to reach floor objects.   Increase functional hip strength to enable reciprocal negotiation of flight stairs safely.                                       Plan  reassess next visit. Cont per POC, stair training, light strengthening.    Treatment Last 4 Visits  Treatment Day: 9       4/1/2025 4/3/2025 4/8/2025 4/10/2025   LE Treatment   Therapeutic Exercise Nustep 5'  Standing hip abd 20x each  P bars lat walk 6L  Shuttle light DL 3 bands 40x  Bridge 20x  Sidelying hip abd 10x4  SB DKTC 20x  SB mini bridge  20x  STS elevated 10x Nustep 5'   Standing hip abd 20x each   P bars lat walk 6L   Shuttle light DL 4 bands 40x   6\" FSU 10x2   Bridge 20x   Standing step back modified range alt 10x2  Sidelying hip abd 10x2   SB DKTC 20x   SB mini bridge 20x   STS elevated 10x ->and lowered range 10x   Nustep 5'   Standing hip abd 20x each   P bars lat walk 6L   Shuttle light DL 4 bands 40x   6\" FSU 10x2   Bridge 20x   Standing step back modified range alt 10x2   Supine hip abd 10x2 -> progression to SLR 3 reps (I)/ with assist 5x  Pelvic tilt and LTR 2'   Nustep 5' L3  Standing hip abd 10x2 each  Stationary bike 5'   Elevated  table mini STS 10x  Bridge partial range 30x     Neuro Re-Education    6\" FSU 10x R  Stair training 6\" step with rail/cane   Therapeutic Exercise Minutes 40 43 45 35   Neuro Re-Educ Minutes    10   Total Time Of Timed Procedures 40 43 45 45   Total Time Of Service-Based Procedures 0 0 0 0   Total Treatment Time 40 43 45 45        HEP  Access Code: [email or web address]  URL: [email or web address]  Date: 03/25/2025  Prepared by: Keiko Tejeda    Exercises  - Supine Ankle Pumps  - 2 x daily - 7 x weekly - 2 sets - 10 reps  - Supine Heel Slide  - 2-3 x daily - 7 x weekly - 2 sets - 10 reps - 3-5 hold  - Supine Gluteal Sets  - 1 x daily - 7 x weekly - 2 sets - 10 reps  - Supine Quadricep Sets  - 1 x daily - 7 x weekly - 1 sets - 10 reps  - Supine Posterior Pelvic Tilt  - 1-2 x daily - 7 x weekly - 1 sets - 10 reps - 5 hold  - Standing Hip Abduction with Counter Support  - 1 x daily - 7 x weekly - 2 sets - 10 reps  - Heel Raises with Counter Support  - 1 x daily - 3 sets - 10 reps - 3 hold  - Standing March with Counter Support  - 1 x daily - 7 x weekly - 2 sets - 10 reps  - Bent Knee Fallouts  - 1-2 x daily - 7 x weekly - 2 sets - 10 reps - 3-5 hold  - Supine Hamstring Stretch  - 1 x daily - 7 x weekly - 2 sets - 10 reps  - Supine Short Arc Quad  - 1 x daily - 7 x weekly - 2 sets - 10 reps  - Supine Hip  Abduction  - 1 x daily - 7 x weekly - 2 sets - 10 reps  - Clamshell  - 1 x daily - 7 x weekly - 2 sets - 10 reps - 3-5 hold  - Small Range Straight Leg Raise  - 1 x daily - 7 x weekly - 2-3 sets - 10 reps - 3 hold  - Beginner Bridge  - 1 x daily - 7 x weekly - 2 sets - 10 reps  - Sit to Stand  - 1 x daily - 7 x weekly - 10 reps    Charges     TE2 NR

## 2025-04-15 ENCOUNTER — OFFICE VISIT (OUTPATIENT)
Dept: INTERNAL MEDICINE CLINIC | Facility: CLINIC | Age: 75
End: 2025-04-15
Payer: MEDICARE

## 2025-04-15 VITALS
BODY MASS INDEX: 35.88 KG/M2 | DIASTOLIC BLOOD PRESSURE: 60 MMHG | SYSTOLIC BLOOD PRESSURE: 122 MMHG | HEIGHT: 62 IN | WEIGHT: 195 LBS | HEART RATE: 78 BPM | RESPIRATION RATE: 20 BRPM | TEMPERATURE: 98 F | OXYGEN SATURATION: 98 %

## 2025-04-15 DIAGNOSIS — N30.01 ACUTE CYSTITIS WITH HEMATURIA: Primary | ICD-10-CM

## 2025-04-15 LAB
BILIRUB UR QL STRIP.AUTO: NEGATIVE
BILIRUBIN: NEGATIVE
COLOR UR AUTO: YELLOW
GLUCOSE (URINE DIPSTICK): NEGATIVE MG/DL
GLUCOSE UR STRIP.AUTO-MCNC: NORMAL MG/DL
KETONES (URINE DIPSTICK): NEGATIVE MG/DL
KETONES UR STRIP.AUTO-MCNC: NEGATIVE MG/DL
LEUKOCYTE ESTERASE UR QL STRIP.AUTO: 500
MULTISTIX LOT#: ABNORMAL NUMERIC
NITRITE, URINE: NEGATIVE
PH UR STRIP.AUTO: 5.5 [PH] (ref 5–8)
PH, URINE: 5.5 (ref 4.5–8)
PROT UR STRIP.AUTO-MCNC: 30 MG/DL
PROTEIN (URINE DIPSTICK): 100 MG/DL
RBC #/AREA URNS AUTO: >10 /HPF
SP GR UR STRIP.AUTO: 1.02 (ref 1–1.03)
SPECIFIC GRAVITY: 1.02 (ref 1–1.03)
URINE-COLOR: YELLOW
UROBILINOGEN UR STRIP.AUTO-MCNC: NORMAL MG/DL
UROBILINOGEN,SEMI-QN: 0.2 MG/DL (ref 0–1.9)
WBC #/AREA URNS AUTO: >50 /HPF

## 2025-04-15 PROCEDURE — 87077 CULTURE AEROBIC IDENTIFY: CPT | Performed by: STUDENT IN AN ORGANIZED HEALTH CARE EDUCATION/TRAINING PROGRAM

## 2025-04-15 PROCEDURE — 81003 URINALYSIS AUTO W/O SCOPE: CPT | Performed by: STUDENT IN AN ORGANIZED HEALTH CARE EDUCATION/TRAINING PROGRAM

## 2025-04-15 PROCEDURE — 87086 URINE CULTURE/COLONY COUNT: CPT | Performed by: STUDENT IN AN ORGANIZED HEALTH CARE EDUCATION/TRAINING PROGRAM

## 2025-04-15 PROCEDURE — 87186 SC STD MICRODIL/AGAR DIL: CPT | Performed by: STUDENT IN AN ORGANIZED HEALTH CARE EDUCATION/TRAINING PROGRAM

## 2025-04-15 PROCEDURE — 81001 URINALYSIS AUTO W/SCOPE: CPT | Performed by: STUDENT IN AN ORGANIZED HEALTH CARE EDUCATION/TRAINING PROGRAM

## 2025-04-15 PROCEDURE — 99213 OFFICE O/P EST LOW 20 MIN: CPT | Performed by: STUDENT IN AN ORGANIZED HEALTH CARE EDUCATION/TRAINING PROGRAM

## 2025-04-15 PROCEDURE — G2211 COMPLEX E/M VISIT ADD ON: HCPCS | Performed by: STUDENT IN AN ORGANIZED HEALTH CARE EDUCATION/TRAINING PROGRAM

## 2025-04-15 RX ORDER — SULFAMETHOXAZOLE AND TRIMETHOPRIM 800; 160 MG/1; MG/1
1 TABLET ORAL 2 TIMES DAILY
Qty: 10 TABLET | Refills: 0 | Status: SHIPPED | OUTPATIENT
Start: 2025-04-15

## 2025-04-15 NOTE — PATIENT INSTRUCTIONS
VISIT SUMMARY:  Martha Garcializas, you came in today with symptoms of a urinary tract infection (UTI), including frequent urination and urinary retention. A urine dipstick test indicated the presence of blood and white blood cells, which suggests a UTI. We have sent your urine sample for culture to confirm the diagnosis and guide further treatment.    YOUR PLAN:  -URINARY TRACT INFECTION (UTI): A urinary tract infection is an infection in any part of your urinary system. You will start taking Bactrim for three to five days. If your symptoms do not improve after three days, continue taking Bactrim for the full five days. We will review the results of your urine culture to confirm the diagnosis and adjust your treatment if necessary.    INSTRUCTIONS:  Please start taking Bactrim as prescribed. If your symptoms do not improve after three days, continue the medication for a total of five days. We will contact you with the results of your urine culture to confirm the diagnosis and discuss any further treatment if needed.    Contains text generated by Nehemias

## 2025-04-15 NOTE — PROGRESS NOTES
CHIEF COMPLAINT:   Chief Complaint   Patient presents with    UTI     Possible UTI symptoms  Stared x 1 week urinating more frequency, discomfort in the groin area and urin looks cloudy .          HPI:   Vianney Daniel is a 74 year old female who presents for urinary symptoms.     Wt Readings from Last 6 Encounters:   04/15/25 195 lb (88.5 kg)   02/18/25 186 lb (84.4 kg)   12/02/24 186 lb (84.4 kg)   07/10/24 183 lb (83 kg)   06/24/24 179 lb 9.6 oz (81.5 kg)   04/15/24 187 lb (84.8 kg)     Body mass index is 35.67 kg/m².     History of Present Illness    Martha Daniel is a 74 year old female who presents with symptoms of a urinary tract infection.    She has been experiencing symptoms consistent with a urinary tract infection for the past week, including frequent urination and urinary retention. No burning with urination. A urine dipstick test was positive for blood and leukocytes.     S/p hip replacement. Feeling better. Decreasing gabapentin usage. Has not used tylenol. Using patch rarely. In PT right now. Planning for extension. Has graduated the cane now. Has been walking without at home. But she was recommended to use it outside because of uneven side walks. Driving for the last two weeks as well.     Current Medications[1]   Past Medical History[2]   Past Surgical History[3]   Family History[4]   Social History:   Short Social Hx on File[5]     REVIEW OF SYSTEMS:   Negative except for what is mentioned in HPI.     Screenings:   1.    2.    3.    4.    5.    6.    7.    8.    9.             EXAM:   /60 (BP Location: Right arm, Patient Position: Sitting, Cuff Size: adult)   Pulse 78   Temp 98 °F (36.7 °C) (Temporal)   Resp 20   Ht 5' 2\" (1.575 m)   Wt 195 lb (88.5 kg)   SpO2 98%   BMI 35.67 kg/m²   Body mass index is 35.67 kg/m².   GENERAL: well developed, well nourished,in no apparent distress    Physical Exam      Labs:   Lab Results   Component Value Date/Time    WBC 5.9 11/14/2024  01:29 PM    HGB 13.2 11/14/2024 01:29 PM    .0 11/14/2024 01:29 PM      Lab Results   Component Value Date/Time     (H) 11/14/2024 01:29 PM     11/14/2024 01:29 PM    K 4.5 11/14/2024 01:29 PM     11/14/2024 01:29 PM    CO2 30.0 11/14/2024 01:29 PM    CREATSERUM 0.96 11/14/2024 01:29 PM    CA 10.0 11/14/2024 01:29 PM    ALB 4.1 11/14/2024 01:29 PM    TP 7.3 11/14/2024 01:29 PM    ALKPHO 57 11/14/2024 01:29 PM    AST 24 11/14/2024 01:29 PM    ALT 23 11/14/2024 01:29 PM    BILT 0.4 11/14/2024 01:29 PM    TSH 1.117 11/14/2024 01:29 PM    T4F 1.1 11/26/2018 01:28 PM        Lab Results   Component Value Date/Time    CHOLEST 179 11/14/2024 01:29 PM    HDL 56 11/14/2024 01:29 PM    TRIG 107 11/14/2024 01:29 PM     (H) 11/14/2024 01:29 PM    NONHDLC 123 11/14/2024 01:29 PM       Lab Results   Component Value Date/Time    A1C 6.1 (H) 11/14/2024 01:29 PM      Lab Results   Component Value Date    VITD 48.4 06/26/2024         Imaging:   No results found.  Assessment & Plan  //Urinary Tract Infection (UTI)  Martha presents with symptoms consistent with a UTI, including frequent urination and urinary retention for one week. A urine dipstick test shows hematuria and leukocyturia, suggesting a UTI. A urine culture is pending for confirmation and to guide further treatment.  - Initiate Bactrim for three to five days.  - If symptoms persist after three days, extend Bactrim to five days.  - Await urine culture results to confirm diagnosis and adjust treatment if necessary.    Recording duration: less than 1 minute    PROBLEMS NOT DISCUSSED TODAY:   //Primary osteoarthritis of the hip, R   Hip replacement planned. Will return for pre-op. Will be getting replacement that does not include nickel in it due to allergies.   PLAN:   -Following with ortho.     //Motion sickness when traveling   PLAN:   Discussed options and side effects of both meclizine and scopolamine. Preferred meclizine.   -Meclizine  12.5mg PRN, can cause drowsiness. Can go up to 25mg if not drowsiness and need better control of motion sickness.     //Insomnia   //JAMES (obstructive sleep apnea)  PLAN:  Mild, positional JAMES per sleep study in 2016.Not on CPAP.   -Sleep study discussed, hold off for now. Will order if patient feels she is able to.   -Continue eszoplicone 1mg as needed. Addictive potential of medication, would not go up on dosage.     //Lumbar radiculopathy   //Moderate spinal canal stenosis at L4-L5  //Degenerative disc disease  Epidural shots for back pain and sciatica. Another one planned as she is going on cruise to [place]. Will take her cane with her.   PLAN:   MRI lumbar spine 2023 showed moderage DJD of the lumbar spine at multi levels and moderate spinal canal stenosis at L4-L5. Completed PT with not much improvement. Falling with ortho specialist.   -Recommend patient to follow-up with ortho if symptoms do not resolve for potential epidural injection as previously discussed.   -Increase gabapentin to 300mg BID as not having drowsiness.   -Continue Lidoderm patch 12 hours on and 12 hours off.  -Continue Tylenol arthritis 650 mg TID. Max dose of tylenol is 3500 mg/day      //Hypercholesterolemia  PLAN:    6/2023. CAC score of 0 8/2022. Diet controlled. Should be on a statin due to elevated ASCVD risk. Patient refuses at this time. Did discuss CAC score does not show new plaque or soft plaque. Only shows old calcified plaques.   The 10-year ASCVD risk score (Aimee DK, et al., 2019) is: 15.1%    Values used to calculate the score:      Age: 74 years      Sex: Female      Is Non- : No      Diabetic: No      Tobacco smoker: No      Systolic Blood Pressure: 128 mmHg      Is BP treated: Yes      HDL Cholesterol: 56 mg/dL      Total Cholesterol: 179 mg/dL    //Chronic heart failure with preserved ejection fraction (HCC)  Expresses concern that increase in entresto has been causing a decrease in her  energy and is afraid of increasing dose further as recommended by Dr. Tapia. Plan is to repeat echo in 3 months to reevaluate GLS and EF and if further decreasing may need to discuss dose up titration.   PLAN:   Most recent echo with stable GLS, but slight decrease in EF from 55 to 50%.   -Follows with Dr. Brunson now.   -GLS in 6 months on new dose of entresto.     //Primary hypertension  PLAN:   At goal. Jardiance and maybe uptitrate carvedilol?   -Entresto 49-51mg BID.   -Carvedilol 3.125mg BID.     //Acquired hypothyroidism  PLAN:   TSH at goal as of 10/2023.   -Continue levothyroxine 75mcg qAM daily.   -repeat TSH ordered.     //Class 1 obesity due to excess calories with serious comorbidity and body mass index (BMI) of 33.0 to 33.9 in adult  Exercising regularly in classes. Monitoring diet.   PLAN:   CTM     //Prediabetes  PLAN:   A1c 5.8%. Stable, CTM     //Hx of stage 3a chronic kidney disease   PLAN:   Patient's creatinine was elevated with the GFR in the 50s earlier this year. Now it has normalized since entresto has been decreased in dosage. Will continue to monitor function, but suspect medication induced as it has now resolved.     //Patient report of mild COPD  Denies shortness of breath, chronic cough, chest pressure.   PLAN:  2014 PFTs:With no evidenec of airway obstruction, Restriction noted, but total lung capacity wnls. Diffusion capacity is mild decreased but correct into the normal range when alveolar lung volumes are taken into account. Discussed with patient that this does not show evidence of COPD and as she is asymptomatic no further concerns at this time.     //Osteopenia of the L femoral neck  //Osteopenia of the L total hip  PLAN:   Progression noted on recent DEXA at [facility], compared to 2021 dexa scan. FRAX of major osteoporotic fracture <20% (12.3). FRAX score of hip fracture <3% (2.8). Do not feel she needs medication at this time. Do recommend continue vitamin D  supplementation and maintenance of calcium intake. She is on calcium supplementation.  -Repeat DEXA scan planned for 2026.      //Chronic leg swelling  //Venous insufficiency   PLAN:   BNP just slightly elevated. Weight is stable to baseline weight. Likely etiology is venous insufficiency. If progressive or gain more than 3lbs in 24 hour period, she is to take lasix 20mg. I have ordered 5 pills for her. She is aware if continues to gain or coupled with shortness of breath she needs to go to the ER due to her history of heart failure.     //R leg contraction knots  Patient reports small knots that she can feel in her muscles on her leg. Concerned if this is something she should be worried about. Can be uncomfortable when pressed on, but otherwise no pain.   PLAN:   Exam notable for presence only with palpation during contraction of the quadricep muscle. Notable above the knee about 1cm in size and additional one located laterally on the side of the thigh as well. Some tenderness noted with palpation. Coincides with flare of sciatica. CTM. If increase in size or more visible, would consider ultrasound of the area to evaluate further. Ddx less concerning for vein pathology, mass. Most recent vein ultrasound negative per review.     HEALTH MAINTENANCE:   -Vaccinations: Prevnar complete, Shingrix done, Flu done, COVID done  -Colonoscopy: 03/16/2018  -Mammogram: 10/15/2024  -Pap Smear: - Aged out  -DEXA: 12/7/2021  -Diabetes screening: Last A1c value was 6.1% done 11/14/2024.  -Lipid screening: Cholesterol: 179, done on 11/14/2024.  HDL Cholesterol: 56, done on 11/14/2024.  TriGlycerides 107, done on 11/14/2024.  LDL Cholesterol: 104, done on 11/14/2024.   -Birth Control: post menopausal   -Smoking: none  -Alcohol: rare   -Marijuana: none  -Recreational drug: none    Return in about 7 weeks (around 6/1/2025) for Medication check .    Bailey Gibbs MD   Internal Medicine     Damonidge tool was used for dictation purposes only  and the patient was not recorded at any point during the visit.               [1]   Current Outpatient Medications   Medication Sig Dispense Refill    sulfamethoxazole-trimethoprim -160 MG Oral Tab per tablet Take 1 tablet by mouth 2 (two) times daily. 10 tablet 0    furosemide 20 MG Oral Tab Take 1 tablet (20 mg total) by mouth daily as needed (If gain more than 3lbs in 24 hours.). 15 tablet 0    ESZOPICLONE 1 MG Oral Tab TAKE 1 TABLET(1 MG) BY MOUTH EVERY NIGHT AS NEEDED 30 tablet 0    levothyroxine 75 MCG Oral Tab Take 1 tablet (75 mcg total) by mouth before breakfast. 90 tablet 3    sacubitril-valsartan 24-26 MG Oral Tab Take 1 tablet by mouth daily.      fluticasone propionate 50 MCG/ACT Nasal Suspension 1 spray by Nasal route 2 (two) times daily. 48 g 3    aspirin 81 MG Oral Tab EC Take 1 tablet (81 mg total) by mouth daily.      gabapentin 300 MG Oral Cap Take 1 capsule (300 mg total) by mouth in the morning, at noon, and at bedtime.      lidocaine 5 % External Patch Place 1 patch onto the skin daily.      riboflavin 100 MG Oral Tab Vitamin B-2  100 mg tablet, [RxNorm: 391373]      carvedilol 3.125 MG Oral Tab Take 1 tablet (3.125 mg total) by mouth 2 (two) times daily with meals.      Pseudoephedrine-Acetaminophen (SM NON-ASPRIN SINUS OR) LOW ASPRIN      Ginkgo Biloba (GINKOBA OR) Take 240 mg by mouth every morning.      Omega 3-6-9 Fatty Acids (OMEGA 3-6-9 COMPLEX) Oral Cap Take 1 capsule by mouth daily.      Tart Molina 1200 MG Oral Cap Take 1 capsule by mouth as needed.      Magnesium 500 MG Oral Tab Take 1 tablet (500 mg total) by mouth daily.      Cholecalciferol (VITAMIN D3) 5000 UNITS Oral Cap Take 1 capsule (5,000 Units total) by mouth daily.      Calcium Carb-Cholecalciferol 500-600 MG-UNIT Oral Tab Take 1 tablet by mouth daily.        Biotin 5000 MCG Oral Tab Take 1 tablet (5 mg total) by mouth daily.      Vitamin B-12 500 MCG Oral Tab Take 1 tablet (500 mcg total) by mouth daily.       Pyridoxine HCl (VITAMIN B-6) 100 MG Oral Tab Take 1 tablet (100 mg total) by mouth daily.      vitamin E 400 UNITS Oral Cap Take 1 capsule (400 Units total) by mouth daily.      Chromium-Cinnamon (CINNAMON PLUS CHROMIUM OR) Take 2,000 mg by mouth 2 (two) times a day.      Cranberry 500 MG Oral Cap Take 1 capsule by mouth 2 (two) times daily.        Lutein-Zeaxanthin 25-5 MG Oral Cap Take 1 capsule by mouth daily.      Ferrous Sulfate 325 (65 FE) MG Oral Tab Take 0.2 tablets (65 mg total) by mouth daily.      Vitamin C (VITAMIN C) 500 MG Oral Tab Take 1 tablet (500 mg total) by mouth daily.     [2]   Past Medical History:   Acquired hypothyroidism    Arthritis    Seeing a Chiroprator for ankles knees and hips especially ri    Arthritis of right hip    Basal cell carcinoma (BCC) of right upper arm    Breast cancer (HCC)    right breast    Cancer (HCC)    Chronic ankle pain, bilateral    CKD (chronic kidney disease) stage 3, GFR 30-59 ml/min ([facility])    Colon polyp, hyperplastic    Colon polyp, hyperplastic    COPD (chronic obstructive pulmonary disease) ([facility])    Depression, recurrent    Essential hypertension    Flat foot    Heart disease    WEAKEND HEART    High blood pressure    History of adenomatous polyp of colon    History of squamous cell carcinoma    Left cheek, January 2018    HTN (hypertension)    Hypothyroidism    Obesity    Obstructive sleep apnea (adult) (pediatric)    AHI 15 supine AHI 22 non-supien AHI 8 SaO2 faith 68 %    Onychomycosis    JAMES on CPAP    Osteoarthritis    Periorbital swelling    nickel allergy    Personal history of antineoplastic chemotherapy    PONV (postoperative nausea and vomiting)    nausea    Prediabetes    Rosacea    Sleep apnea    NO CPAP USE AT THIS TIME    Spinal stenosis of lumbar region at multiple levels    Stage 3a chronic kidney disease (HCC)    Subclinical hypothyroidism    Swelling of both ankles    UTI (urinary tract infection)    Venous insufficiency    Visual  impairment    glasses   [3]   Past Surgical History:  Procedure Laterality Date    Arthroscopy of joint unlisted      Biopsy of breast, needle core      x3  benign    Breast reconstruction Bilateral 2/23/15    with (FEMI)abdominal free flap    Colonoscopy  age 57 yrs    Colonoscopy      Colonoscopy N/A 3/16/2018    Procedure: COLONOSCOPY, POSSIBLE BIOPSY, POSSIBLE POLYPECTOMY 38915;  Surgeon: Sonja Cedillo MD;  Location: [facility]    D & c      Mastectomy left      Mastectomy right      Mri breast biopsy right Right 1/13/15    Needle biopsy left      Needle biopsy right            Other Bilateral 2/23/15    mastectomies      Other  2022    LEFT FRONTAL NEEDLE BIOPSY OF CALVARIAL LESION WITH NEURO NAVIGATIONLeftGeneral    Other surgical history  03/16/15    power port    Other surgical history  2015    Bilateral mastectomy    Other surgical history  2018    excision skin left cheek- actinic keratosis    Shoulder surg proc unlisted Left     removal of benign lump    Skin surgery      Tonsillectomy      Us breast biopsy Right 12/10/14   [4]   Family History  Problem Relation Age of Onset    Glaucoma Mother     Dementia Mother     Cancer Mother         Bladder Cancer    Hypertension Mother     Fuchs' dystrophy Mother     Other (htn) Mother     Other (osteoporosis) Mother     Diabetes Father     Other (heart issues) Paternal Grandfather     Diabetes Maternal Grandmother         \"runs on mothers side\"    Other (Other) Maternal Grandmother     Cancer Sister 64        Breast Cancer    Cancer Sister         Breast Cancer    Fuchs' dystrophy Sister     Cancer Daughter 46        Breast Cancer    No Known Problems Daughter    [5]   Social History  Socioeconomic History    Marital status:    Tobacco Use    Smoking status: Never    Smokeless tobacco: Never    Tobacco comments:     Updated 24   Vaping Use    Vaping status: Never Used   Substance and Sexual Activity    Alcohol  use: Yes     Alcohol/week: 0.0 - 1.0 standard drinks of alcohol     Comment: Social 1-2 month Never been drunk    Drug use: No   Other Topics Concern    Caffeine Concern No    Stress Concern No    Weight Concern Yes     Comment: Noom    Special Diet Yes     Comment: Noom    Exercise Yes    Seat Belt Yes     Social Drivers of Health     Food Insecurity: Low Risk  (2/24/2025)    Received from [facility]    Food Insecurity     Have there been times that your food ran out, and you didn't have money to get more?: No     Are there times that you worry that this might happen?: No   Transportation Needs: Low Risk  (2/24/2025)    Received from [facility]    Transportation Needs     Do you have trouble getting transportation to medical appointments?: No   Housing Stability: Low Risk  (2/24/2025)    Received from [facility]    Housing Stability     Are you worried that your electric, gas, oil, or water might be shut off?: No     Are you concerned about having a safe and reliable place to live?: No

## 2025-04-17 ENCOUNTER — OFFICE VISIT (OUTPATIENT)
Dept: PHYSICAL THERAPY | Age: 75
End: 2025-04-17
Attending: ORTHOPAEDIC SURGERY
Payer: MEDICARE

## 2025-04-17 PROCEDURE — 97110 THERAPEUTIC EXERCISES: CPT

## 2025-04-17 PROCEDURE — 97112 NEUROMUSCULAR REEDUCATION: CPT

## 2025-04-18 NOTE — PROGRESS NOTES
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 Referral Information:    Date of Evaluation: Req: 0, Auth: 0, Exp:     03/13/25 POC Auth Visits:  10       Today's Date   4/17/2025     Progress Summary  Pt has attended 10 visits in Physical Therapy.   0 cancels      Subjective  Continues to work on the exercises every day.   Pain has decreased to 3/10.   Sleeping remians very limited, just started Lunesta. Getting the leg up into car/bed without issue.   Stairs are still difficult.       Pain: 3/10     Objective  Amb without device short community distances. significantly improved qulaity vs preop.     SLS 1-2 sec       Hip       3/13/2025 4/10/2025 4/17/2025   Hip ROM/MMT   Rt Hip Flexion 90       Gentle hip ROM assessment due to post op status 100 105   Lt Hip Flexion 110     Rt Hip Flexion MMT (L2) 2+       gross assessment due to post op status; will cont to assess  3+   Rt Hip Extension   -5   Rt Hip Extension MMT   3+   Rt Hip Abduction MMT 2+  4   Lt Hip Abduction MMT   4-   Rt Hip ER 5 25 25   Lt Hip ER 40     Rt Hip IR 10 10 20   Lt Hip IR 15     , Knee       4/17/2025   Knee ROM/MMT   Rt Knee Flexion 4+   Lt Knee Flexion 4+   Rt Knee Extension (L3) 5 *intermittent knee pain   Lt Knee Extension (L3) 5            Assessment  Pt continues to making steady progress post MARIA ESTHER. Long hx of ipsilateral sciatica with flare up after surgery; this has gradually been subsiding.   Pt has progressed to ambulation without the cane short community distances, with fatigue. At times pt will wisely use the cane for safety outside the house.  Functionally stairs are limited; we continue to work on this.  This week pt has progressed to SLR  without assist for a few reps. LE remains weak; she will benefit from additional PT.    Goals (to be met in 18 visits)   Progress made toward all goals.   (I) with HEP -ongoing  Improved active hip flex to 110 deg for easier don/doff shoes, transfers.-progressing  Improved hip ABD strength to 4+/5 to increase ease with standing and walking. -progressing  SLS improved to 10 sec to promote safety and decreased risk of falls on uneven surfaces. progressing  Able to perform safe functional squat to reach floor objects. -with UE support  Increase functional hip strength to enable reciprocal negotiation of flight stairs safely. -not yet met, progressing                                           Plan  MD follow up next week. Pt has completed initial 10/10 visits. Recommend an additional 6 visits to cont to progress exercises regimen, strengthening gait/balance and stair training.      Post LEFS Score  Post LEFS Score: (Patient-Rptd) 57.5 % (4/17/2025  9:39 PM)    38.75 % improvement         Patient/Family/Caregiver was advised of these findings, precautions, and treatment options and has agreed to actively participate in planning and for this course of care.    Thank you for your referral. If you have any questions, please contact me at Dept: [phone].    Sincerely,  Electronically signed by therapist: Keiko Tejeda, PT     Physician's certification required:  Yes  Please co-sign or sign and return this letter via fax as soon as possible to [phone].   I certify the need for these services furnished under this plan of treatment and while under my care.    X___________________________________________________ Date____________________    Certification From: 4/17/2025  To:7/16/2025       Treatment Last 4 Visits  Treatment Day: 10       4/3/2025 4/8/2025 4/10/2025 4/17/2025   LE Treatment   Therapeutic Exercise Nustep 5'   Standing hip abd 20x each   P bars lat walk 6L   Shuttle light DL 4 bands 40x   6\" FSU 10x2    Bridge 20x   Standing step back modified range alt 10x2  Sidelying hip abd 10x2   SB DKTC 20x   SB mini bridge 20x   STS elevated 10x ->and lowered range 10x   Nustep 5'   Standing hip abd 20x each   P bars lat walk 6L   Shuttle light DL 4 bands 40x   6\" FSU 10x2   Bridge 20x   Standing step back modified range alt 10x2   Supine hip abd 10x2 -> progression to SLR 3 reps (I)/ with assist 5x  Pelvic tilt and LTR 2'   Nustep 5' L3  Standing hip abd 10x2 each  Stationary bike 5'   Elevated  table mini STS 10x  Bridge partial range 30x   Nustep 5'  SLR 5x  HEP review, side glut med, standing step backs added  Reassess  Bridge 10x2 5\"  Progress to sidelying hip abd 10-20 reps as fannie  Standing hip abd 10x2 each   Neuro Re-Education   6\" FSU 10x R  Stair training 6\" step with rail/cane 6\" FSU 10x each LE  Stair training 6\" with UE support  Home stairs, safety review, use of rail/1 cane as needed.  STS 10x hip hinge  Bed mobility supine <->prone  Cont scar massage/desensitization   Therapeutic Exercise Minutes 43 45 35 25   Neuro Re-Educ Minutes   10 20   Total Time Of Timed Procedures 43 45 45 45   Total Time Of Service-Based Procedures 0 0 0 0   Total Treatment Time 43 45 45 45        HEP  Access Code: [email or web address]  URL: [email or web address]  Date: 03/25/2025  Prepared by: Keiko Tejeda    Exercises  - Supine Ankle Pumps  - 2 x daily - 7 x weekly - 2 sets - 10 reps  - Supine Heel Slide  - 2-3 x daily - 7 x weekly - 2 sets - 10 reps - 3-5 hold  - Supine Gluteal Sets  - 1 x daily - 7 x weekly - 2 sets - 10 reps  - Supine Quadricep Sets  - 1 x daily - 7 x weekly - 1 sets - 10 reps  - Supine Posterior Pelvic Tilt  - 1-2 x daily - 7 x weekly - 1 sets - 10 reps - 5 hold  - Standing Hip Abduction with Counter Support  - 1 x daily - 7 x weekly - 2 sets - 10 reps  - Heel Raises with Counter Support  - 1 x daily - 3 sets - 10 reps - 3 hold  - Standing March with Counter Support  - 1 x daily - 7 x weekly - 2 sets  - 10 reps  - Bent Knee Fallouts  - 1-2 x daily - 7 x weekly - 2 sets - 10 reps - 3-5 hold  - Supine Hamstring Stretch  - 1 x daily - 7 x weekly - 2 sets - 10 reps  - Supine Short Arc Quad  - 1 x daily - 7 x weekly - 2 sets - 10 reps  - Supine Hip Abduction  - 1 x daily - 7 x weekly - 2 sets - 10 reps  - Clamshell  - 1 x daily - 7 x weekly - 2 sets - 10 reps - 3-5 hold  - Small Range Straight Leg Raise  - 1 x daily - 7 x weekly - 2-3 sets - 10 reps - 3 hold  - Beginner Bridge  - 1 x daily - 7 x weekly - 2 sets - 10 reps  - Sit to Stand  - 1 x daily - 7 x weekly - 10 reps    Charges     TE2 NR

## 2025-04-24 ENCOUNTER — OFFICE VISIT (OUTPATIENT)
Dept: PHYSICAL THERAPY | Age: 75
End: 2025-04-24
Attending: ORTHOPAEDIC SURGERY
Payer: MEDICARE

## 2025-04-24 PROCEDURE — 97110 THERAPEUTIC EXERCISES: CPT

## 2025-04-24 NOTE — PROGRESS NOTES
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 Referral Information:    Date of Evaluation: Req: 0, Auth: 0, Exp:     03/13/25 POC Auth Visits:  10       Today's Date   4/24/2025    Subjective  Reaching the sock/shoes without an issue now, able to get compression socks on now, some struggle but able to do it.   Climbed down flight of steps for the first time, slowly with the rail and the cane. Started going to the [facility] center, walked on the track, very tiring.       Pain: 3/10     Objective  Amb without device short community distances. significantly improved qulaity vs preop.     SLS 1-2 sec    Hip       3/13/2025 4/10/2025 4/17/2025   Hip ROM/MMT   Rt Hip Flexion 90       Gentle hip ROM assessment due to post op status 100 105   Lt Hip Flexion 110     Rt Hip Flexion MMT (L2) 2+       gross assessment due to post op status; will cont to assess  3+   Rt Hip Extension   -5   Rt Hip Extension MMT   3+   Rt Hip Abduction MMT 2+  4   Lt Hip Abduction MMT   4-   Rt Hip ER 5 25 25   Lt Hip ER 40     Rt Hip IR 10 10 20   Lt Hip IR 15     , Knee       4/17/2025   Knee ROM/MMT   Rt Knee Flexion 4+   Lt Knee Flexion 4+   Rt Knee Extension (L3) 5 *intermittent knee pain   Lt Knee Extension (L3) 5            Assessment  Continues to make functional gains; Beginning to recipricate on stairs for the first time. Pt very motivated and tends to overdo; emphasis on pacing principles.    Goals (to be met in 18 visits)   Progress made toward all goals.   (I) with HEP -ongoing  Improved active hip flex to 110 deg for easier don/doff shoes, transfers.-progressing  Improved hip ABD strength to 4+/5 to increase ease with standing and walking.  -progressing  SLS improved to 10 sec to promote safety and decreased risk of falls on uneven surfaces. progressing  Able to perform safe functional squat to reach floor objects. -with UE support  Increase functional hip strength to enable reciprocal negotiation of flight stairs safely. -not yet met, progressing       Plan  Cont per POC, strengthening as tolerated, continue stair training.    Treatment Last 4 Visits  Treatment Day: 11       4/8/2025 4/10/2025 4/17/2025 4/24/2025   LE Treatment   Therapeutic Exercise Nustep 5'   Standing hip abd 20x each   P bars lat walk 6L   Shuttle light DL 4 bands 40x   6\" FSU 10x2   Bridge 20x   Standing step back modified range alt 10x2   Supine hip abd 10x2 -> progression to SLR 3 reps (I)/ with assist 5x  Pelvic tilt and LTR 2'   Nustep 5' L3  Standing hip abd 10x2 each  Stationary bike 5'   Elevated  table mini STS 10x  Bridge partial range 30x   Nustep 5'  SLR 5x  HEP review, side glut med, standing step backs added  Reassess  Bridge 10x2 5\"  Progress to sidelying hip abd 10-20 reps as fannie  Standing hip abd 10x2 each Stationary bike 5' L1   HEP upgrades  Bridge 10x  BKFO  10x2  SLR 10x5\" holds  STS hip hinge 10x  Shuttle DL 4 bands 10x3  6\" FSU 10x, down 5x *knee pain limit  SB DKTC 20x   Neuro Re-Education  6\" FSU 10x R  Stair training 6\" step with rail/cane 6\" FSU 10x each LE  Stair training 6\" with UE support  Home stairs, safety review, use of rail/1 cane as needed.  STS 10x hip hinge  Bed mobility supine <->prone  Cont scar massage/desensitization    Therapeutic Exercise Minutes 45 35 25 43   Neuro Re-Educ Minutes  10 20    Total Time Of Timed Procedures 45 45 45 43   Total Time Of Service-Based Procedures 0 0 0 0   Total Treatment Time 45 45 45 43        HEP  Access Code: [email or web address]  URL: [email or web address]  Date: 03/25/2025  Prepared by: Keiko Tejeda    Exercises  - Supine Ankle Pumps  - 2 x daily - 7 x weekly - 2 sets - 10 reps  - Supine Heel  Slide  - 2-3 x daily - 7 x weekly - 2 sets - 10 reps - 3-5 hold  - Supine Gluteal Sets  - 1 x daily - 7 x weekly - 2 sets - 10 reps  - Supine Quadricep Sets  - 1 x daily - 7 x weekly - 1 sets - 10 reps  - Supine Posterior Pelvic Tilt  - 1-2 x daily - 7 x weekly - 1 sets - 10 reps - 5 hold  - Standing Hip Abduction with Counter Support  - 1 x daily - 7 x weekly - 2 sets - 10 reps  - Heel Raises with Counter Support  - 1 x daily - 3 sets - 10 reps - 3 hold  - Standing March with Counter Support  - 1 x daily - 7 x weekly - 2 sets - 10 reps  - Bent Knee Fallouts  - 1-2 x daily - 7 x weekly - 2 sets - 10 reps - 3-5 hold  - Supine Hamstring Stretch  - 1 x daily - 7 x weekly - 2 sets - 10 reps  - Supine Short Arc Quad  - 1 x daily - 7 x weekly - 2 sets - 10 reps  - Supine Hip Abduction  - 1 x daily - 7 x weekly - 2 sets - 10 reps  - Clamshell  - 1 x daily - 7 x weekly - 2 sets - 10 reps - 3-5 hold  - Small Range Straight Leg Raise  - 1 x daily - 7 x weekly - 2-3 sets - 10 reps - 3 hold  - Beginner Bridge  - 1 x daily - 7 x weekly - 2 sets - 10 reps  - Sit to Stand  - 1 x daily - 7 x weekly - 10 reps    Charges     TE3

## 2025-05-01 ENCOUNTER — OFFICE VISIT (OUTPATIENT)
Dept: PHYSICAL THERAPY | Age: 75
End: 2025-05-01
Attending: ORTHOPAEDIC SURGERY
Payer: MEDICARE

## 2025-05-01 PROCEDURE — 97110 THERAPEUTIC EXERCISES: CPT

## 2025-05-01 PROCEDURE — 97112 NEUROMUSCULAR REEDUCATION: CPT

## 2025-05-01 NOTE — PROGRESS NOTES
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 Referral Information:    Date of Evaluation: Req: 0, Auth: 0, Exp:     03/13/25 POC Auth Visits:  16       Today's Date   5/1/2025    Subjective  Reports going down the stairs difficult. R knee pain on and off. Has followed up with surgeon, released until 1 year follow up       Pain: 3/10     Objective  6\" step (+) R knee;Amb without device short community distances. significantly improved qulaity vs preop.     SLS 1-2 sec       Hip       3/13/2025 4/10/2025 4/17/2025   Hip ROM/MMT   Rt Hip Flexion 90       Gentle hip ROM assessment due to post op status 100 105   Lt Hip Flexion 110     Rt Hip Flexion MMT (L2) 2+       gross assessment due to post op status; will cont to assess  3+   Rt Hip Extension   -5   Rt Hip Extension MMT   3+   Rt Hip Abduction MMT 2+  4   Lt Hip Abduction MMT   4-   Rt Hip ER 5 25 25   Lt Hip ER 40     Rt Hip IR 10 10 20   Lt Hip IR 15     , Knee       4/17/2025   Knee ROM/MMT   Rt Knee Flexion 4+   Lt Knee Flexion 4+   Rt Knee Extension (L3) 5 *intermittent knee pain   Lt Knee Extension (L3) 5          Assessment  Weakness gluteals with assoc compensation with WB exercises.  Improved tolerance and form with supine ex's with good muscle recruitment.    Goals (to be met in 18 visits)   Progress made toward all goals.   (I) with HEP -ongoing  Improved active hip flex to 110 deg for easier don/doff shoes, transfers.-progressing  Improved hip ABD strength to 4+/5 to increase ease with standing and walking. -progressing  SLS improved to 10 sec to promote safety and decreased risk of falls on uneven surfaces. progressing  Able to perform safe functional squat to  reach floor objects. -with UE support  Increase functional hip strength to enable reciprocal negotiation of flight stairs safely. -not yet met, progressing           Plan  Cont per POC, strengthening as tolerated    Treatment Last 4 Visits  Treatment Day: 12       4/10/2025 4/17/2025 4/24/2025 5/1/2025   LE Treatment   Therapeutic Exercise Nustep 5' L3  Standing hip abd 10x2 each  Stationary bike 5'   Elevated  table mini STS 10x  Bridge partial range 30x   Nustep 5'  SLR 5x  HEP review, side glut med, standing step backs added  Reassess  Bridge 10x2 5\"  Progress to sidelying hip abd 10-20 reps as fannie  Standing hip abd 10x2 each Stationary bike 5' L1   HEP upgrades  Bridge 10x  BKFO  10x2  SLR 10x5\" holds  STS hip hinge 10x  Shuttle DL 4 bands 10x3  6\" FSU 10x, down 5x *knee pain limit  SB DKTC 20x NUstep 6'  6\" FSU/step downs review  Shuttle 4 bands 30x DL -> SL modified  3 bands L/R 20x each  Clam 30x   Sidelying glut med 20x     Neuro Re-Education 6\" FSU 10x R  Stair training 6\" step with rail/cane 6\" FSU 10x each LE  Stair training 6\" with UE support  Home stairs, safety review, use of rail/1 cane as needed.  STS 10x hip hinge  Bed mobility supine <->prone  Cont scar massage/desensitization  STS hip hinge/hands on knees  Avoid increased knee pain on steps  Standing TrAb control hip abd 10x each   Therapeutic Exercise Minutes 35 25 43 35   Neuro Re-Educ Minutes 10 20  10   Total Time Of Timed Procedures 45 45 43 45   Total Time Of Service-Based Procedures 0 0 0 0   Total Treatment Time 45 45 43 45        HEP  Access Code: [email or web address]  URL: [email or web address]  Date: 03/25/2025  Prepared by: Keiko Tejeda    Exercises  - Supine Ankle Pumps  - 2 x daily - 7 x weekly - 2 sets - 10 reps  - Supine Heel Slide  - 2-3 x daily - 7 x weekly - 2 sets - 10 reps - 3-5 hold  - Supine Gluteal Sets  - 1 x daily - 7 x weekly - 2 sets - 10 reps  - Supine Quadricep Sets  - 1 x daily - 7 x weekly - 1 sets - 10  reps  - Supine Posterior Pelvic Tilt  - 1-2 x daily - 7 x weekly - 1 sets - 10 reps - 5 hold  - Standing Hip Abduction with Counter Support  - 1 x daily - 7 x weekly - 2 sets - 10 reps  - Heel Raises with Counter Support  - 1 x daily - 3 sets - 10 reps - 3 hold  - Standing March with Counter Support  - 1 x daily - 7 x weekly - 2 sets - 10 reps  - Bent Knee Fallouts  - 1-2 x daily - 7 x weekly - 2 sets - 10 reps - 3-5 hold  - Supine Hamstring Stretch  - 1 x daily - 7 x weekly - 2 sets - 10 reps  - Supine Short Arc Quad  - 1 x daily - 7 x weekly - 2 sets - 10 reps  - Supine Hip Abduction  - 1 x daily - 7 x weekly - 2 sets - 10 reps  - Clamshell  - 1 x daily - 7 x weekly - 2 sets - 10 reps - 3-5 hold  - Small Range Straight Leg Raise  - 1 x daily - 7 x weekly - 2-3 sets - 10 reps - 3 hold  - Beginner Bridge  - 1 x daily - 7 x weekly - 2 sets - 10 reps  - Sit to Stand  - 1 x daily - 7 x weekly - 10 reps    Charges     TE2 NR

## 2025-05-12 ENCOUNTER — OFFICE VISIT (OUTPATIENT)
Dept: PHYSICAL THERAPY | Age: 75
End: 2025-05-12
Attending: ORTHOPAEDIC SURGERY
Payer: MEDICARE

## 2025-05-12 PROCEDURE — 97110 THERAPEUTIC EXERCISES: CPT

## 2025-05-12 NOTE — PROGRESS NOTES
Patient: Vianney Daniel (74 year old, female) Referring Provider:  Insurance:   Diagnosis: History of total right Anterior hip replacement (Z96.641) Jose Miguel De La Torre  MEDICARE   Date of Surgery: 2/24/25 Next MD visit:  [facility]   Precautions:  -- (per protocol; WBAT; AD 3 weeks; avoid repetative SL FWB x 6 weeks; No ext x4 wks) 4/22/25. Referral Information:    Date of Evaluation: Req: 0, Auth: 0, Exp:     03/13/25 POC Auth Visits:  16       Today's Date   5/12/2025    Subjective  Primary complaint is L heel pain that has returned, 7/10+ pain. Walked 2 laps on the track. Pain that night. Reports the hip has been making progress.       Pain: 2/10 (7/10 heel.  hip 1-2, sciatica 2-3)     Objective  amb without device; (+) L TTP plantar fascia insertion at calcaneus       Assessment  Flare up of L plantar fasciitis last week; some relief noted with the rest. Reviewed past management principles.  Modifications for appropriate calf stretch avoiding aggravating the heel pain.  Continued emphasis on the strengthening in NWB to help transition to more prolonged walking/standing.    Goals (to be met in 18 visits)   Progress made toward all goals.   (I) with HEP -ongoing  Improved active hip flex to 110 deg for easier don/doff shoes, transfers.-progressing  Improved hip ABD strength to 4+/5 to increase ease with standing and walking. -progressing  SLS improved to 10 sec to promote safety and decreased risk of falls on uneven surfaces. progressing  Able to perform safe functional squat to reach floor objects. -with UE support  Increase functional hip strength to enable reciprocal negotiation of flight stairs safely. -not yet met, progressing               Plan  Cont per POC, TB progressions as fannie    Treatment Last 4 Visits  Treatment Day: 13       4/17/2025 4/24/2025 5/1/2025 5/12/2025   LE Treatment   Therapeutic Exercise Nustep 5'  SLR 5x  HEP review, side glut med, standing step backs added  Reassess  Bridge 10x2  5\"  Progress to sidelying hip abd 10-20 reps as fannie  Standing hip abd 10x2 each Stationary bike 5' L1   HEP upgrades  Bridge 10x  BKFO  10x2  SLR 10x5\" holds  STS hip hinge 10x  Shuttle DL 4 bands 10x3  6\" FSU 10x, down 5x *knee pain limit  SB DKTC 20x NUstep 6'  6\" FSU/step downs review  Shuttle 4 bands 30x DL -> SL modified  3 bands L/R 20x each  Clam 30x   Sidelying glut med 20x   NUstep 6'   Board calf stretch 4x30\"  Gastroc /soleus stretch review; wall 30\"x3  SB DKTC 20  SB quad set 10x  HEP review, sciatica/plantar fasciitis pt ed/management  Sidelying glut med 20x   AA gastroc w stretch strap         Neuro Re-Education 6\" FSU 10x each LE  Stair training 6\" with UE support  Home stairs, safety review, use of rail/1 cane as needed.  STS 10x hip hinge  Bed mobility supine <->prone  Cont scar massage/desensitization  STS hip hinge/hands on knees  Avoid increased knee pain on steps  Standing TrAb control hip abd 10x each    Therapeutic Exercise Minutes 25 43 35 45   Neuro Re-Educ Minutes 20  10    Total Time Of Timed Procedures 45 43 45 45   Total Time Of Service-Based Procedures 0 0 0 0   Total Treatment Time 45 43 45 45        HEP  Access Code: [email or web address]  URL: [email or web address]  Date: 03/25/2025  Prepared by: Keiko Tejeda    Exercises  - Supine Ankle Pumps  - 2 x daily - 7 x weekly - 2 sets - 10 reps  - Supine Heel Slide  - 2-3 x daily - 7 x weekly - 2 sets - 10 reps - 3-5 hold  - Supine Gluteal Sets  - 1 x daily - 7 x weekly - 2 sets - 10 reps  - Supine Quadricep Sets  - 1 x daily - 7 x weekly - 1 sets - 10 reps  - Supine Posterior Pelvic Tilt  - 1-2 x daily - 7 x weekly - 1 sets - 10 reps - 5 hold  - Standing Hip Abduction with Counter Support  - 1 x daily - 7 x weekly - 2 sets - 10 reps  - Heel Raises with Counter Support  - 1 x daily - 3 sets - 10 reps - 3 hold  - Standing March with Counter Support  - 1 x daily - 7 x weekly - 2 sets - 10 reps  - Bent Knee Fallouts  - 1-2 x daily - 7  x weekly - 2 sets - 10 reps - 3-5 hold  - Supine Hamstring Stretch  - 1 x daily - 7 x weekly - 2 sets - 10 reps  - Supine Short Arc Quad  - 1 x daily - 7 x weekly - 2 sets - 10 reps  - Supine Hip Abduction  - 1 x daily - 7 x weekly - 2 sets - 10 reps  - Clamshell  - 1 x daily - 7 x weekly - 2 sets - 10 reps - 3-5 hold  - Small Range Straight Leg Raise  - 1 x daily - 7 x weekly - 2-3 sets - 10 reps - 3 hold  - Beginner Bridge  - 1 x daily - 7 x weekly - 2 sets - 10 reps  - Sit to Stand  - 1 x daily - 7 x weekly - 10 reps    Charges     TE3

## 2025-05-20 ENCOUNTER — OFFICE VISIT (OUTPATIENT)
Dept: PHYSICAL THERAPY | Age: 75
End: 2025-05-20
Attending: ORTHOPAEDIC SURGERY
Payer: MEDICARE

## 2025-05-20 PROCEDURE — 97112 NEUROMUSCULAR REEDUCATION: CPT

## 2025-05-20 PROCEDURE — 97110 THERAPEUTIC EXERCISES: CPT

## 2025-05-20 NOTE — PROGRESS NOTES
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 Referral Information:    Date of Evaluation: Req: 0, Auth: 0, Exp:     03/13/25 POC Auth Visits:  16       Today's Date   5/20/2025    Subjective  Reports squatted down in the kitchen and the R knee was so painful.  Diffiulty getting back up. Knee was more sore, starting to get better. (2-3/10 sciatica)       Pain: 1/10     Objective  amb without device; (+) L TTP plantar fascia insertion at calcaneus       Hip       4/10/2025 4/17/2025 5/20/2025   Hip ROM/MMT   Rt Hip Flexion 100 105 124   Rt Hip Flexion MMT (L2)  3+    Rt Hip Extension  -5    Rt Hip Extension MMT  3+    Rt Hip Abduction MMT  4    Lt Hip Abduction MMT  4-    Rt Hip ER 25 25 30   Rt Hip IR 10 20 25          Assessment  Able to gradually progress LE strengthening mat ex's.  Advised to continue to limit overdoing WB activity, prolonged stand/walk due to aggravation of pain, sciatic, plantar fasciitis.    Goals (to be met in 18 visits)   Progress made toward all goals.   (I) with HEP -ongoing  Improved active hip flex to 110 deg for easier don/doff shoes, transfers.-progressing  Improved hip ABD strength to 4+/5 to increase ease with standing and walking. -progressing  SLS improved to 10 sec to promote safety and decreased risk of falls on uneven surfaces. progressing  Able to perform safe functional squat to reach floor objects. -with UE support  Increase functional hip strength to enable reciprocal negotiation of flight stairs safely. -not yet met, progressing                   Plan  Quad control/strengthening. Cont per POC, TB progressions as fannie    Treatment Last 4 Visits  Treatment Day: 14       4/24/2025 5/1/2025  5/12/2025 5/20/2025   LE Treatment   Therapeutic Exercise Stationary bike 5' L1   HEP upgrades  Bridge 10x  BKFO  10x2  SLR 10x5\" holds  STS hip hinge 10x  Shuttle DL 4 bands 10x3  6\" FSU 10x, down 5x *knee pain limit  SB DKTC 20x NUstep 6'  6\" FSU/step downs review  Shuttle 4 bands 30x DL -> SL modified  3 bands L/R 20x each  Clam 30x   Sidelying glut med 20x   NUstep 6'   Board calf stretch 4x30\"  Gastroc /soleus stretch review; wall 30\"x3  SB DKTC 20  SB quad set 10x  HEP review, sciatica/plantar fasciitis pt ed/management  Sidelying glut med 20x   AA gastroc w stretch strap       NUstep 6'   Board calf stretch 4x30\"   Gastroc /soleus stretch review; wall 30\"x3   Quad set towel roll with increased hold SLR 5x  AA gastroc w stretch strap      Neuro Re-Education  STS hip hinge/hands on knees  Avoid increased knee pain on steps  Standing TrAb control hip abd 10x each  Functional squat train modifications, UE assist, staggered stance, modified STS elevated table with hip hinge, avoid anterior knee pain  HEP progressions, staggered min bridge 10x each L/R  Desensitization review scar    Therapeutic Exercise Minutes 43 35 45 25   Neuro Re-Educ Minutes  10  20   Total Time Of Timed Procedures 43 45 45 45   Total Time Of Service-Based Procedures 0 0 0 0   Total Treatment Time 43 45 45 45        HEP  Access Code: [email or web address]  URL: [email or web address]  Date: 03/25/2025  Prepared by: Keiko Tejeda    Exercises  - Supine Ankle Pumps  - 2 x daily - 7 x weekly - 2 sets - 10 reps  - Supine Heel Slide  - 2-3 x daily - 7 x weekly - 2 sets - 10 reps - 3-5 hold  - Supine Gluteal Sets  - 1 x daily - 7 x weekly - 2 sets - 10 reps  - Supine Quadricep Sets  - 1 x daily - 7 x weekly - 1 sets - 10 reps  - Supine Posterior Pelvic Tilt  - 1-2 x daily - 7 x weekly - 1 sets - 10 reps - 5 hold  - Standing Hip Abduction with Counter Support  - 1 x daily - 7 x weekly - 2 sets - 10 reps  - Heel Raises with Counter Support  -  1 x daily - 3 sets - 10 reps - 3 hold  - Standing March with Counter Support  - 1 x daily - 7 x weekly - 2 sets - 10 reps  - Bent Knee Fallouts  - 1-2 x daily - 7 x weekly - 2 sets - 10 reps - 3-5 hold  - Supine Hamstring Stretch  - 1 x daily - 7 x weekly - 2 sets - 10 reps  - Supine Short Arc Quad  - 1 x daily - 7 x weekly - 2 sets - 10 reps  - Supine Hip Abduction  - 1 x daily - 7 x weekly - 2 sets - 10 reps  - Clamshell  - 1 x daily - 7 x weekly - 2 sets - 10 reps - 3-5 hold  - Small Range Straight Leg Raise  - 1 x daily - 7 x weekly - 2-3 sets - 10 reps - 3 hold  - Beginner Bridge  - 1 x daily - 7 x weekly - 2 sets - 10 reps  - Sit to Stand  - 1 x daily - 7 x weekly - 10 reps    Charges     TE2 NR

## 2025-05-27 ENCOUNTER — OFFICE VISIT (OUTPATIENT)
Dept: PHYSICAL THERAPY | Age: 75
End: 2025-05-27
Attending: ORTHOPAEDIC SURGERY
Payer: MEDICARE

## 2025-05-27 PROCEDURE — 97110 THERAPEUTIC EXERCISES: CPT

## 2025-05-28 NOTE — PROGRESS NOTES
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 Referral Information:    Date of Evaluation: Req: 0, Auth: 0, Exp:     03/13/25 POC Auth Visits:  16       Today's Date   5/27/2025    Subjective  pleased with hip progress. terrible heel pain acting up.       Pain: 1/10 (hip)     Objective  amb without device; (+) L TTP plantar fascia insertion at calcaneus           Assessment  Excellent progress post R MARIA ESTHER.  Functionally WB activities limited due to flare up of plantar fasciitis. Continued emphasis on not overdoing the walking,standing, stairs, houshold chores/yardwork for hours, but to continue to address this with the strengthening program. Expect gradual progression.    Goals (to be met in 18 visits)   Progress made toward all goals.   (I) with HEP -ongoing  Improved active hip flex to 110 deg for easier don/doff shoes, transfers.-progressing  Improved hip ABD strength to 4+/5 to increase ease with standing and walking. -progressing  SLS improved to 10 sec to promote safety and decreased risk of falls on uneven surfaces. progressing  Able to perform safe functional squat to reach floor objects. -with UE support  Increase functional hip strength to enable reciprocal negotiation of flight stairs safely. -not yet met, progressing                       Plan  Finalize HEP/progressions, reassess for d/c next visit.    Treatment Last 4 Visits  Treatment Day: 15       5/1/2025 5/12/2025 5/20/2025 5/27/2025   LE Treatment   Therapeutic Exercise NUstep 6'  6\" FSU/step downs review  Shuttle 4 bands 30x DL -> SL modified  3 bands L/R 20x each  Clam 30x   Sidelying glut med 20x   NUstep 6'   Board calf stretch 4x30\"  Gastroc /soleus  stretch review; wall 30\"x3  SB DKTC 20  SB quad set 10x  HEP review, sciatica/plantar fasciitis pt ed/management  Sidelying glut med 20x   AA gastroc w stretch strap       NUstep 6'   Board calf stretch 4x30\"   Gastroc /soleus stretch review; wall 30\"x3   Quad set towel roll with increased hold SLR 5x  AA gastroc w stretch strap    Nustep 5'  Shuttle 4 bands 30x -> SL 10x2-modifiy range to avoid knee pain   HEP review and progressions  Bridge 10x -> progression to staggered L/R 10x  Wt shift -> SLS trials, UE prn  SLR continues to become easier  Gastroc/soleus stretches 30\" holds - pt ed options for home     Neuro Re-Education STS hip hinge/hands on knees  Avoid increased knee pain on steps  Standing TrAb control hip abd 10x each  Functional squat train modifications, UE assist, staggered stance, modified STS elevated table with hip hinge, avoid anterior knee pain  HEP progressions, staggered min bridge 10x each L/R  Desensitization review scar     Therapeutic Exercise Minutes 35 45 25 40   Neuro Re-Educ Minutes 10  20    Total Time Of Timed Procedures 45 45 45 40   Total Time Of Service-Based Procedures 0 0 0 0   Total Treatment Time 45 45 45 40        HEP  Access Code: [email or web address]  URL: [email or web address]  Date: 03/25/2025  Prepared by: Keiko Tejeda    Exercises  - Supine Ankle Pumps  - 2 x daily - 7 x weekly - 2 sets - 10 reps  - Supine Heel Slide  - 2-3 x daily - 7 x weekly - 2 sets - 10 reps - 3-5 hold  - Supine Gluteal Sets  - 1 x daily - 7 x weekly - 2 sets - 10 reps  - Supine Quadricep Sets  - 1 x daily - 7 x weekly - 1 sets - 10 reps  - Supine Posterior Pelvic Tilt  - 1-2 x daily - 7 x weekly - 1 sets - 10 reps - 5 hold  - Standing Hip Abduction with Counter Support  - 1 x daily - 7 x weekly - 2 sets - 10 reps  - Heel Raises with Counter Support  - 1 x daily - 3 sets - 10 reps - 3 hold  - Standing March with Counter Support  - 1 x daily - 7 x weekly - 2 sets - 10 reps  - Bent Knee  Fallouts  - 1-2 x daily - 7 x weekly - 2 sets - 10 reps - 3-5 hold  - Supine Hamstring Stretch  - 1 x daily - 7 x weekly - 2 sets - 10 reps  - Supine Short Arc Quad  - 1 x daily - 7 x weekly - 2 sets - 10 reps  - Supine Hip Abduction  - 1 x daily - 7 x weekly - 2 sets - 10 reps  - Clamshell  - 1 x daily - 7 x weekly - 2 sets - 10 reps - 3-5 hold  - Small Range Straight Leg Raise  - 1 x daily - 7 x weekly - 2-3 sets - 10 reps - 3 hold  - Beginner Bridge  - 1 x daily - 7 x weekly - 2 sets - 10 reps  - Sit to Stand  - 1 x daily - 7 x weekly - 10 reps    Charges     TE3

## 2025-06-03 ENCOUNTER — OFFICE VISIT (OUTPATIENT)
Dept: PHYSICAL THERAPY | Age: 75
End: 2025-06-03
Attending: ORTHOPAEDIC SURGERY
Payer: MEDICARE

## 2025-06-03 PROCEDURE — 97110 THERAPEUTIC EXERCISES: CPT

## 2025-06-03 PROCEDURE — 97112 NEUROMUSCULAR REEDUCATION: CPT

## 2025-06-04 NOTE — PROGRESS NOTES
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 Referral Information:    Date of Evaluation: Req: 0, Auth: 0, Exp:     03/13/25 POC Auth Visits:  [id number]       Today's Date   6/3/2025     Progress / Discharge Summary  Pt has attended 16 visits in Physical Therapy.  0 cancels 0 no shows    Subjective  The hip feels great! Pleased with progress.  Pleased was able to go down the stairs without the cane.  remaining difficulties - very difficult to get up off the floor. Improvement with walking, going to stores, holding cart helpful.  Primary c/o pain is the heel pain after plantar fasciitis irritation.   Able to stand at the counter ~ 20-30 mins max.       Pain: 0/10 (no c/o R hip pain; heel 5-6/10. sciatica 2/10)     Objective  amb without device; (+) L TTP plantar fascia insertion at calcaneus       Hip       4/17/2025 5/20/2025 6/4/2025   Hip ROM/MMT   Rt Hip Flexion 105 124 124   Rt Hip Flexion MMT (L2) 3+  4   Rt Hip Extension -5  -5   Rt Hip Extension MMT 3+  4   Rt Hip Abduction MMT 4  4   Lt Hip Abduction MMT 4-     Rt Hip ER 25 30 30   Rt Hip IR 20 25 25          Assessment  Excellent progress post R MARIA ESTHER.  Functionally WB activities most limited due to flare up of plantar fasciitis. Continued emphasis on not overdoing the walking,standing, stairs, houshold chores/yardwork for hours, as pt is very motivated and tends to overdo. Promoting continued emphasis on strengthening program/mat ex's to help support WB activity.   , Pt is ready to cont with independent HEP. Expect gradual progression.    Goals (to be met in 18 visits)   Progress made toward all goals.   (I) with HEP -met  Improved active hip flex to 110 deg for easier  don/doff shoes, transfers.-met  Improved hip ABD strength to 4+/5 to increase ease with standing and walking. -progressing  SLS improved to 10 sec to promote safety and decreased risk of falls on uneven surfaces. Progressing. Limited to 1-3 sec.   Able to perform safe functional squat to reach floor objects. -with UE support  Increase functional hip strength to enable reciprocal negotiation of flight stairs safely. -met. Cautioned to not overdo stairs       Plan  D/C to HEP    Post LEFS Score  Post LEFS Score: (Patient-Rptd) 75 % (6/3/2025  2:59 PM)    56.25 % improvement    Patient/Family/Caregiver was advised of these findings, precautions, and treatment options and has agreed to actively participate in planning and for this course of care.    Thank you for your referral. If you have any questions, please contact me at Dept: [phone].    Sincerely,  Electronically signed by therapist: Keiko Tejeda, PT     Physician's certification required:  No       Treatment Last 4 Visits  Treatment Day: 16       5/12/2025 5/20/2025 5/27/2025 6/3/2025   LE Treatment   Therapeutic Exercise NUstep 6'   Board calf stretch 4x30\"  Gastroc /soleus stretch review; wall 30\"x3  SB DKTC 20  SB quad set 10x  HEP review, sciatica/plantar fasciitis pt ed/management  Sidelying glut med 20x   AA gastroc w stretch strap       NUstep 6'   Board calf stretch 4x30\"   Gastroc /soleus stretch review; wall 30\"x3   Quad set towel roll with increased hold SLR 5x  AA gastroc w stretch strap    Nustep 5'  Shuttle 4 bands 30x -> SL 10x2-modifiy range to avoid knee pain   HEP review and progressions  Bridge 10x -> progression to staggered L/R 10x  Wt shift -> SLS trials, UE prn  SLR continues to become easier  Gastroc/soleus stretches 30\" holds - pt ed options for home   Nustep 5'  Reassess and review of HEP for d/c, gym options  Modified ana stretch 60\"   Neuro Re-Education  Functional squat train modifications, UE assist, staggered stance,  modified STS elevated table with hip hinge, avoid anterior knee pain  HEP progressions, staggered min bridge 10x each L/R  Desensitization review scar   Floor transfer technique, use UE assist of chair/bed  1/4 wall sit for quads, consider bilat leg press at gym  Staggered bridge R/L   STS w incr use of LE's as able  Semi tandem + head turns progression, corner/support for safety as needed  Pt ed safety awareness, fall prevention principles  SLS with light support - neutral train   Therapeutic Exercise Minutes 45 25 40 15   Neuro Re-Educ Minutes  20  30   Total Time Of Timed Procedures 45 45 40 45   Total Time Of Service-Based Procedures 0 0 0 0   Total Treatment Time 45 45 40 45   HEP    Access Code: [email or web address]  URL: [email or web address]  Date: 06/04/2025  Prepared by: Keiko Tejeda    Program Notes  Remember avoid that knee pain and that heel pain with strengthening exercises!    Exercises  - Supine Heel Slide  - 2-3 x daily - 7 x weekly - 2 sets - 10 reps - 3-5 hold  - Supine Posterior Pelvic Tilt  - 1-2 x daily - 7 x weekly - 1 sets - 10 reps - 5 hold  - Standing Hip Abduction with Counter Support  - 1 x daily - 7 x weekly - 2 sets - 10 reps  - Standing March with Counter Support  - 1 x daily - 7 x weekly - 2 sets - 10 reps  - Bent Knee Fallouts  - 1-2 x daily - 7 x weekly - 2 sets - 10 reps - 3-5 hold  - Supine Hamstring Stretch  - 1 x daily - 7 x weekly - 2 sets - 10 reps  - Supine Short Arc Quad  - 1 x daily - 7 x weekly - 2 sets - 10 reps  - Small Range Straight Leg Raise  - 1 x daily - 7 x weekly - 2-3 sets - 10 reps - 3 hold  - Supine Bridge  - 1 x daily - 2 sets - 10 reps - 5 hold  - Sidelying Hip Abduction  - 1-2 x daily - 3 sets - 10 reps - 3 hold  - Semi-Tandem Corner Balance With Eyes Open  - 1-2 x daily - 3-5 reps - 30 hold  - Sit to Stand  - 1 x daily - 7 x weekly - 10 reps  - Wall Quarter Squat  - 1 x daily - 7 x weekly - 5 reps - 30-60 sec hold        HEP    Access Code:  [email or web address]  URL: [email or web address]  Date: 06/04/2025  Prepared by: Keiko Tejeda    Program Notes  Remember avoid that knee pain and that heel pain with strengthening exercises!    Exercises  - Supine Heel Slide  - 2-3 x daily - 7 x weekly - 2 sets - 10 reps - 3-5 hold  - Supine Posterior Pelvic Tilt  - 1-2 x daily - 7 x weekly - 1 sets - 10 reps - 5 hold  - Standing Hip Abduction with Counter Support  - 1 x daily - 7 x weekly - 2 sets - 10 reps  - Standing March with Counter Support  - 1 x daily - 7 x weekly - 2 sets - 10 reps  - Bent Knee Fallouts  - 1-2 x daily - 7 x weekly - 2 sets - 10 reps - 3-5 hold  - Supine Hamstring Stretch  - 1 x daily - 7 x weekly - 2 sets - 10 reps  - Supine Short Arc Quad  - 1 x daily - 7 x weekly - 2 sets - 10 reps  - Small Range Straight Leg Raise  - 1 x daily - 7 x weekly - 2-3 sets - 10 reps - 3 hold  - Supine Bridge  - 1 x daily - 2 sets - 10 reps - 5 hold  - Sidelying Hip Abduction  - 1-2 x daily - 3 sets - 10 reps - 3 hold  - Modified David Stretch  - 1 x daily - 3 reps - 30 hold  - Semi-Tandem Corner Balance With Eyes Open  - 1-2 x daily - 3-5 reps - 30 hold  - Sit to Stand  - 1 x daily - 7 x weekly - 10 reps  - Wall Quarter Squat  - 1 x daily - 7 x weekly - 5 reps - 30-60 sec hold      Charges     NR2 TE

## 2025-06-04 NOTE — PROGRESS NOTES
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 Referral Information:    Date of Evaluation: Req: 0, Auth: 0, Exp:     03/13/25 POC Auth Visits:  16       Today's Date   6/3/2025     Progress / Discharge Summary  Pt has attended 16 visits in Physical Therapy.  0 cancels 0 no shows    Subjective  The hip feels great! Pleased with progress.  Pleased was able to go down the stairs without the cane.  remaining difficulties - very difficult to get up off the floor. Improvement with walking, going to stores, holding cart helpful.  Primary c/o pain is the heel pain after plantar fasciitis irritation.   Able to stand at the counter ~ 20-30 mins max.       Pain: 0/10 (no c/o R hip pain; heel 5-6/10. sciatica 2/10)     Objective  amb without device; (+) L TTP plantar fascia insertion at calcaneus       Hip       4/17/2025 5/20/2025 6/4/2025   Hip ROM/MMT   Rt Hip Flexion 105 124 124   Rt Hip Flexion MMT (L2) 3+  4   Rt Hip Extension -5  -5   Rt Hip Extension MMT 3+  4   Rt Hip Abduction MMT 4  4   Lt Hip Abduction MMT 4-     Rt Hip ER 25 30 30   Rt Hip IR 20 25 25          Assessment  Excellent progress post R MARIA ESTHER.  Functionally WB activities most limited due to flare up of plantar fasciitis. Continued emphasis on not overdoing the walking,standing, stairs, houshold chores/yardwork for hours, as pt is very motivated and tends to overdo. Promoting continued emphasis on strengthening program/mat ex's to help support WB activity.   , Pt is ready to cont with independent HEP. Expect gradual progression.    Goals (to be met in 18 visits)   Progress made toward all goals.   (I) with HEP -met  Improved active hip flex to 110 deg for easier  don/doff shoes, transfers.-met  Improved hip ABD strength to 4+/5 to increase ease with standing and walking. -progressing  SLS improved to 10 sec to promote safety and decreased risk of falls on uneven surfaces. Progressing. Limited to 1-3 sec.   Able to perform safe functional squat to reach floor objects. -with UE support  Increase functional hip strength to enable reciprocal negotiation of flight stairs safely. -met. Cautioned to not overdo stairs       Plan  D/C to HEP    Post LEFS Score  Post LEFS Score: (Patient-Rptd) 75 % (6/3/2025  2:59 PM)    56.25 % improvement    Patient/Family/Caregiver was advised of these findings, precautions, and treatment options and has agreed to actively participate in planning and for this course of care.    Thank you for your referral. If you have any questions, please contact me at Dept: [phone].    Sincerely,  Electronically signed by therapist: Keiko Tejeda, PT     Physician's certification required:  No       Treatment Last 4 Visits  Treatment Day: 16       5/12/2025 5/20/2025 5/27/2025 6/3/2025   LE Treatment   Therapeutic Exercise NUstep 6'   Board calf stretch 4x30\"  Gastroc /soleus stretch review; wall 30\"x3  SB DKTC 20  SB quad set 10x  HEP review, sciatica/plantar fasciitis pt ed/management  Sidelying glut med 20x   AA gastroc w stretch strap       NUstep 6'   Board calf stretch 4x30\"   Gastroc /soleus stretch review; wall 30\"x3   Quad set towel roll with increased hold SLR 5x  AA gastroc w stretch strap    Nustep 5'  Shuttle 4 bands 30x -> SL 10x2-modifiy range to avoid knee pain   HEP review and progressions  Bridge 10x -> progression to staggered L/R 10x  Wt shift -> SLS trials, UE prn  SLR continues to become easier  Gastroc/soleus stretches 30\" holds - pt ed options for home   Nustep 5'  Reassess and review of HEP for d/c, gym options     Neuro Re-Education  Functional squat train modifications, UE assist, staggered stance, modified STS elevated table  with hip hinge, avoid anterior knee pain  HEP progressions, staggered min bridge 10x each L/R  Desensitization review scar   Floor transfer technique, use UE assist of chair/bed  1/4 wall sit for quads, consider bilat leg press at gym  Staggered bridge R/L   STS w incr use of LE's as able  Semi tandem + head turns progression, corner/support for safety as needed  Pt ed safety awareness, fall prevention principles  SLS with light support - neutral train   Therapeutic Exercise Minutes 45 25 40 15   Neuro Re-Educ Minutes  20  30   Total Time Of Timed Procedures 45 45 40 45   Total Time Of Service-Based Procedures 0 0 0 0   Total Treatment Time 45 45 40 45   HEP    Access Code: [email or web address]  URL: [email or web address]  Date: 06/04/2025  Prepared by: Keiko Tejeda    Program Notes  Remember avoid that knee pain and that heel pain with strengthening exercises!    Exercises  - Supine Heel Slide  - 2-3 x daily - 7 x weekly - 2 sets - 10 reps - 3-5 hold  - Supine Posterior Pelvic Tilt  - 1-2 x daily - 7 x weekly - 1 sets - 10 reps - 5 hold  - Standing Hip Abduction with Counter Support  - 1 x daily - 7 x weekly - 2 sets - 10 reps  - Standing March with Counter Support  - 1 x daily - 7 x weekly - 2 sets - 10 reps  - Bent Knee Fallouts  - 1-2 x daily - 7 x weekly - 2 sets - 10 reps - 3-5 hold  - Supine Hamstring Stretch  - 1 x daily - 7 x weekly - 2 sets - 10 reps  - Supine Short Arc Quad  - 1 x daily - 7 x weekly - 2 sets - 10 reps  - Small Range Straight Leg Raise  - 1 x daily - 7 x weekly - 2-3 sets - 10 reps - 3 hold  - Supine Bridge  - 1 x daily - 2 sets - 10 reps - 5 hold  - Sidelying Hip Abduction  - 1-2 x daily - 3 sets - 10 reps - 3 hold  - Semi-Tandem Corner Balance With Eyes Open  - 1-2 x daily - 3-5 reps - 30 hold  - Sit to Stand  - 1 x daily - 7 x weekly - 10 reps  - Wall Quarter Squat  - 1 x daily - 7 x weekly - 5 reps - 30-60 sec hold        HEP    Access Code: [email or web address]  URL:  [email or web address]  Date: 06/04/2025  Prepared by: Keiko Tejeda    Program Notes  Remember avoid that knee pain and that heel pain with strengthening exercises!    Exercises  - Supine Heel Slide  - 2-3 x daily - 7 x weekly - 2 sets - 10 reps - 3-5 hold  - Supine Posterior Pelvic Tilt  - 1-2 x daily - 7 x weekly - 1 sets - 10 reps - 5 hold  - Standing Hip Abduction with Counter Support  - 1 x daily - 7 x weekly - 2 sets - 10 reps  - Standing March with Counter Support  - 1 x daily - 7 x weekly - 2 sets - 10 reps  - Bent Knee Fallouts  - 1-2 x daily - 7 x weekly - 2 sets - 10 reps - 3-5 hold  - Supine Hamstring Stretch  - 1 x daily - 7 x weekly - 2 sets - 10 reps  - Supine Short Arc Quad  - 1 x daily - 7 x weekly - 2 sets - 10 reps  - Small Range Straight Leg Raise  - 1 x daily - 7 x weekly - 2-3 sets - 10 reps - 3 hold  - Supine Bridge  - 1 x daily - 2 sets - 10 reps - 5 hold  - Sidelying Hip Abduction  - 1-2 x daily - 3 sets - 10 reps - 3 hold  - Semi-Tandem Corner Balance With Eyes Open  - 1-2 x daily - 3-5 reps - 30 hold  - Sit to Stand  - 1 x daily - 7 x weekly - 10 reps  - Wall Quarter Squat  - 1 x daily - 7 x weekly - 5 reps - 30-60 sec hold    Charges     NR2 TE

## 2025-06-09 ENCOUNTER — OFFICE VISIT (OUTPATIENT)
Dept: INTERNAL MEDICINE CLINIC | Facility: CLINIC | Age: 75
End: 2025-06-09
Payer: MEDICARE

## 2025-06-09 VITALS
RESPIRATION RATE: 18 BRPM | HEIGHT: 62 IN | HEART RATE: 72 BPM | BODY MASS INDEX: 35.51 KG/M2 | DIASTOLIC BLOOD PRESSURE: 70 MMHG | SYSTOLIC BLOOD PRESSURE: 112 MMHG | WEIGHT: 193 LBS | TEMPERATURE: 98 F | OXYGEN SATURATION: 97 %

## 2025-06-09 DIAGNOSIS — Z17.0 MALIGNANT NEOPLASM OF CENTRAL PORTION OF RIGHT BREAST IN FEMALE, ESTROGEN RECEPTOR POSITIVE (HCC): ICD-10-CM

## 2025-06-09 DIAGNOSIS — C50.111 MALIGNANT NEOPLASM OF CENTRAL PORTION OF RIGHT BREAST IN FEMALE, ESTROGEN RECEPTOR POSITIVE (HCC): ICD-10-CM

## 2025-06-09 DIAGNOSIS — N18.31 STAGE 3A CHRONIC KIDNEY DISEASE (HCC): Primary | ICD-10-CM

## 2025-06-09 DIAGNOSIS — E03.9 ACQUIRED HYPOTHYROIDISM: ICD-10-CM

## 2025-06-09 DIAGNOSIS — I50.32 CHRONIC HEART FAILURE WITH PRESERVED EJECTION FRACTION (HCC): ICD-10-CM

## 2025-06-09 DIAGNOSIS — F51.01 PRIMARY INSOMNIA: ICD-10-CM

## 2025-06-09 PROCEDURE — 99214 OFFICE O/P EST MOD 30 MIN: CPT | Performed by: STUDENT IN AN ORGANIZED HEALTH CARE EDUCATION/TRAINING PROGRAM

## 2025-06-09 PROCEDURE — G2211 COMPLEX E/M VISIT ADD ON: HCPCS | Performed by: STUDENT IN AN ORGANIZED HEALTH CARE EDUCATION/TRAINING PROGRAM

## 2025-06-09 RX ORDER — ESZOPICLONE 1 MG/1
1 TABLET, FILM COATED ORAL NIGHTLY PRN
Qty: 30 TABLET | Refills: 0 | Status: SHIPPED | OUTPATIENT
Start: 2025-06-09 | End: 2025-06-09

## 2025-06-09 RX ORDER — FLUTICASONE PROPIONATE 50 MCG
1 SPRAY, SUSPENSION (ML) NASAL 2 TIMES DAILY
Qty: 48 G | Refills: 3 | Status: SHIPPED | OUTPATIENT
Start: 2025-06-09

## 2025-06-09 RX ORDER — ESZOPICLONE 2 MG/1
2 TABLET, FILM COATED ORAL NIGHTLY PRN
Qty: 30 TABLET | Refills: 0 | Status: SHIPPED | OUTPATIENT
Start: 2025-06-09

## 2025-06-09 RX ORDER — FUROSEMIDE 20 MG/1
20 TABLET ORAL DAILY PRN
Qty: 30 TABLET | Refills: 0 | Status: SHIPPED | OUTPATIENT
Start: 2025-06-09

## 2025-06-09 RX ORDER — LEVOTHYROXINE SODIUM 75 UG/1
75 TABLET ORAL
Qty: 90 TABLET | Refills: 3 | Status: SHIPPED | OUTPATIENT
Start: 2025-06-09

## 2025-06-09 NOTE — PATIENT INSTRUCTIONS
Dr. Elizabeth [facility]    VISIT SUMMARY:  During today's visit, we discussed your heel spur pain, sleep issues, and several other health concerns. We reviewed your current medications and their effects, and we talked about potential changes to help manage your symptoms better. We also discussed your recent ultrasound results and your desire to switch doctors for breast care management. Additionally, we planned follow-up appointments and tests to monitor your overall health.    YOUR PLAN:  -HEEL SPUR: A heel spur is a bony growth on the heel that can cause significant pain, especially after standing for long periods. You should try using Voltaren gel and Ryan's Original Heel Spur cream together to help reduce inflammation and pain. If the pain continues, we may need to refer you to a podiatrist for further evaluation and possible treatments like steroid injections or surgery.    -CHRONIC LEG SWELLING AND VENOUS INSUFFICIENCY: Chronic leg swelling and venous insufficiency occur when your veins have trouble sending blood from your legs back to your heart, causing fluid buildup. Continue using furosemide as needed and consider lymphedema therapy with a physical therapist. We may also refer you to a vein clinic for further evaluation and management options.    -INSOMNIA: Insomnia is difficulty falling or staying asleep. We will increase your eszopiclone dose to 2 mg to see if it helps. If it doesn't, we can consider switching to zolpidem (Ambien).    -OBESITY: Obesity is having excess body weight. We discussed the potential use of Wellbutrin to help suppress your appetite. Continue with intermittent fasting and physical activity as much as you can tolerate.    -BREAST CYSTS: Breast cysts are fluid-filled sacs within the breast, which are usually benign. Your recent ultrasound showed multiple simple cysts with no concerning features. We will schedule a follow-up ultrasound in October and refer you to Dr. Driscoll  [facility] for further breast care management.    -CHRONIC KIDNEY DISEASE: Chronic kidney disease is a condition where the kidneys gradually lose function. We will order blood tests to check your kidney function to ensure it has recovered post-surgery.    -ACQUIRED HYPOTHYROIDISM: Hypothyroidism is when your thyroid gland doesn't produce enough hormones. We will order blood tests to check your thyroid function.    INSTRUCTIONS:  Please follow up with your cardiologist on September 23. Schedule an appointment with Dr. Keller at the end of July. Attend your eye surgeon appointment next week for cataract evaluation and your dermatologist appointment on July 14. Also, schedule blood tests at any [facility] location to check your kidney and thyroid function.    Contains text generated by Nehemias chin

## 2025-06-09 NOTE — PROGRESS NOTES
CHIEF COMPLAINT:   Chief Complaint   Patient presents with    Medication Follow-Up       HPI:   Vianney Daniel is a 74 year old female who presents for medication check.      Wt Readings from Last 6 Encounters:   06/09/25 193 lb (87.5 kg)   04/15/25 195 lb (88.5 kg)   02/18/25 186 lb (84.4 kg)   12/02/24 186 lb (84.4 kg)   07/10/24 183 lb (83 kg)   06/24/24 179 lb 9.6 oz (81.5 kg)     Body mass index is 35.3 kg/m².     History of Present Illness  Vinaney Daniel is a 74 year old female who presents with heel spur pain and sleep issues.    Following hip surgery, she initially experienced significant hip and sciatic pain, which has since resolved to a pain level of zero for the hip and two for the sciatic pain. She experienced nausea from tramadol, which has been added to her allergy list.    She is currently experiencing significant pain from a heel spur on her left foot, with pain levels ranging from five to seven, particularly worsening by the end of the day. She recalls a previous episode in 2017 when physical therapy helped alleviate similar symptoms. She is performing exercises and stretches recommended by her chiropractor and is considering using a heel spur cream recommended by a friend.    She uses furosemide once a week and reports ankle swelling, which she attributes to both weight and swelling issues. She has been attempting intermittent fasting to aid weight loss but reports difficulty due to decreased activity levels over the past four months. She mentions a history of sciatica that has limited her physical activity since November 2023.    She is currently taking gabapentin for sciatica, which she finds beneficial, although it increases her appetite. She takes one pill in the morning and two at night, which initially helped with sleep but has since become less effective. She also takes eszopiclone for sleep, which takes about four hours to take effect, and magnesium glycinate to aid  sleep.    She has a history of breast cancer and notes a recent ultrasound showed cysts, which she feels were not adequately addressed by her current doctor. She is considering switching to another doctor for further evaluation.    She reports venous insufficiency with significant fluid retention in her ankles, despite using compression socks. She has difficulty elevating her legs above heart level due to her current setup at home.      Current Medications[1]   Past Medical History[2]   Past Surgical History[3]   Family History[4]   Social History:   Short Social Hx on File[5]     REVIEW OF SYSTEMS:   Negative except for what is mentioned in HPI.     Screenings:   1.    2.    3.    4.    5.    6.    7.    8.    9.             EXAM:   /70 (BP Location: Right arm, Patient Position: Sitting, Cuff Size: large)   Pulse 72   Temp 97.8 °F (36.6 °C) (Temporal)   Resp 18   Ht 5' 2\" (1.575 m)   Wt 193 lb (87.5 kg)   SpO2 97%   BMI 35.30 kg/m²   Body mass index is 35.3 kg/m².   GENERAL: well developed, well nourished,in no apparent distress    Physical Exam      Labs:   Lab Results   Component Value Date/Time    WBC 5.9 11/14/2024 01:29 PM    HGB 13.2 11/14/2024 01:29 PM    .0 11/14/2024 01:29 PM      Lab Results   Component Value Date/Time     (H) 11/14/2024 01:29 PM     11/14/2024 01:29 PM    K 4.5 11/14/2024 01:29 PM     11/14/2024 01:29 PM    CO2 30.0 11/14/2024 01:29 PM    CREATSERUM 0.96 11/14/2024 01:29 PM    CA 10.0 11/14/2024 01:29 PM    ALB 4.1 11/14/2024 01:29 PM    TP 7.3 11/14/2024 01:29 PM    ALKPHO 57 11/14/2024 01:29 PM    AST 24 11/14/2024 01:29 PM    ALT 23 11/14/2024 01:29 PM    BILT 0.4 11/14/2024 01:29 PM    TSH 1.117 11/14/2024 01:29 PM    T4F 1.1 11/26/2018 01:28 PM        Lab Results   Component Value Date/Time    CHOLEST 179 11/14/2024 01:29 PM    HDL 56 11/14/2024 01:29 PM    TRIG 107 11/14/2024 01:29 PM     (H) 11/14/2024 01:29 PM    NONHDLC 123  11/14/2024 01:29 PM       Lab Results   Component Value Date/Time    A1C 6.1 (H) 11/14/2024 01:29 PM      Lab Results   Component Value Date    VITD 48.4 06/26/2024         Imaging:   No results found.  Assessment & Plan  Heel Spur  Chronic heel spur pain on the left foot, rated between 5 and 7, exacerbated by prolonged standing. Managed with exercises and chiropractic care. Considering topical treatments for pain relief. Discussed the use of Voltaren gel and Ryan's Original Heel Spur cream. Both are safe to use together and may help reduce inflammation and pain.  - Try Voltaren gel on the heel for pain relief.  - Consider using Ryan's Original Heel Spur cream.  - If symptoms persist, consider referral to podiatry for further evaluation and potential interventions such as steroid injections or surgery.    Chronic Leg Swelling and Venous Insufficiency  Persistent ankle swelling, exacerbated by weight and prolonged sitting. Uses furosemide as needed, providing some relief. Compression socks are used but difficult to apply due to bandaging for heel spur. Discussed potential benefits of lymphedema therapy and vein clinic evaluation.  - Continue using furosemide as needed for swelling.  - Consider lymphedema therapy with a physical therapist.  - Consider referral to a vein clinic for further evaluation and management options.  - BNP ordered due to hx of heart failure.     Insomnia  Chronic insomnia with current treatment of eszopiclone 1 mg, which takes 4 hours to be effective. Open to trying alternative treatments. Discussed increasing eszopiclone to 2 mg or switching to zolpidem (Ambien) if ineffective.  - Increase eszopiclone to 2 mg and assess effectiveness.  - If ineffective, consider switching to zolpidem (Ambien).   - Would consider sleep medicine clinic evaluation due to hx of chronic insomnia.     Obesity  Class 1 obesity with difficulty losing weight. Trying intermittent fasting and aware of the potential  appetite-increasing side effects of gabapentin. Discussed potential use of Wellbutrin for appetite suppression. Phentermine was not recommended due to cardiac side effects, especially in older adults.  - Discuss potential use of Wellbutrin for appetite suppression. Refused metformin.   - Encourage continuation of intermittent fasting and physical activity as tolerated.    Breast Cysts  History of breast cancer with current findings of multiple simple cysts on ultrasound. No concerning features noted, and findings are reassuring.   - Schedule follow-up ultrasound in October.  - Facilitate referral to Dr. Yamila Alves for breast care management per patient preference.     Chronic Kidney Disease  Stage 3a chronic kidney disease. Recent surgery may have affected kidney function, necessitating re-evaluation. Will check kidney function to ensure recovery post-surgery.  - Order blood tests to check kidney function.    Acquired Hypothyroidism  Hypothyroidism. Thyroid function has not been checked recently. Will evaluate current thyroid status.  - Order blood tests to check thyroid function.    Follow-up  Multiple upcoming appointments with specialists and requires follow-up on various health issues.  - Follow up with cardiologist on September 23.  - Schedule appointment with Dr. Keller at the end of July.  - Attend eye surgeon appointment next week for cataract evaluation.  - Attend dermatologist appointment on July 14.  - Schedule blood tests at any [facility] location for kidney and thyroid function.    Recording duration: 32 minutes    PROBLEMS NOT DISCUSSED TODAY:   //Primary osteoarthritis of the hip s/p R hip replacement  //c/b sciatica  PLAN:   -Following with ortho.     //Motion sickness when traveling   PLAN:   Discussed options and side effects of both meclizine and scopolamine. Preferred meclizine.   -Meclizine 12.5mg PRN, can cause drowsiness. Can go up to 25mg if not drowsiness and need better control of  motion sickness.     //Insomnia   //JAMES (obstructive sleep apnea)  PLAN:  Mild, positional JAMES per sleep study in 2016.Not on CPAP.   -Sleep study discussed, hold off for now. Will order if patient feels she is able to.   -increase eszoplicone 1mg -->2mg as needed. Addictive potential of medication, would not go up on dosage.     //Lumbar radiculopathy   //Moderate spinal canal stenosis at L4-L5  //Degenerative disc disease  Epidural shots for back pain and sciatica. Another one planned as she is going on cruise to [place]. Will take her cane with her.   PLAN:   MRI lumbar spine 2023 showed moderage DJD of the lumbar spine at multi levels and moderate spinal canal stenosis at L4-L5. Completed PT with not much improvement. Falling with ortho specialist.   -Recommend patient to follow-up with ortho if symptoms do not resolve for potential epidural injection as previously discussed.   -Increase gabapentin to 300mg BID as not having drowsiness.   -Continue Lidoderm patch 12 hours on and 12 hours off.  -Continue Tylenol arthritis 650 mg TID. Max dose of tylenol is 3500 mg/day      //Hypercholesterolemia  PLAN:    6/2023. CAC score of 0 8/2022. Diet controlled. Should be on a statin due to elevated ASCVD risk. Patient refuses at this time. Did discuss CAC score does not show new plaque or soft plaque. Only shows old calcified plaques.   The 10-year ASCVD risk score (Aimee DK, et al., 2019) is: 15.1%    Values used to calculate the score:      Age: 74 years      Sex: Female      Is Non- : No      Diabetic: No      Tobacco smoker: No      Systolic Blood Pressure: 128 mmHg      Is BP treated: Yes      HDL Cholesterol: 56 mg/dL      Total Cholesterol: 179 mg/dL    //Chronic heart failure with preserved ejection fraction (HCC)  Expresses concern that increase in entresto has been causing a decrease in her energy and is afraid of increasing dose further as recommended by Dr. Tapia. Plan is to  repeat echo in 3 months to reevaluate GLS and EF and if further decreasing may need to discuss dose up titration.   PLAN:   Most recent echo with stable GLS, but slight decrease in EF from 55 to 50%.   -Follows with Dr. Brunson now.   -GLS in 6 months on new dose of entresto.     //Primary hypertension  PLAN:   At goal. Jardiance and maybe uptitrate carvedilol?   -Entresto 49-51mg BID.   -Carvedilol 3.125mg BID.     //Acquired hypothyroidism  PLAN:   TSH at goal as of 10/2023.   -Continue levothyroxine 75mcg qAM daily.   -repeat TSH ordered.     //Class 1 obesity due to excess calories with serious comorbidity and body mass index (BMI) of 33.0 to 33.9 in adult  Exercising regularly in classes. Monitoring diet.   PLAN:   CTM     //Prediabetes  PLAN:   A1c 5.8%. Stable, CTM     //Hx of stage 3a chronic kidney disease   PLAN:   Patient's creatinine was elevated with the GFR in the 50s earlier this year. Now it has normalized since entresto has been decreased in dosage. Will continue to monitor function, but suspect medication induced as it has now resolved.     //Patient report of mild COPD  Denies shortness of breath, chronic cough, chest pressure.   PLAN:  2014 PFTs:With no evidenec of airway obstruction, Restriction noted, but total lung capacity wnls. Diffusion capacity is mild decreased but correct into the normal range when alveolar lung volumes are taken into account. Discussed with patient that this does not show evidence of COPD and as she is asymptomatic no further concerns at this time.     //Osteopenia of the L femoral neck  //Osteopenia of the L total hip  PLAN:   Progression noted on recent DEXA at [facility], compared to 2021 dexa scan. FRAX of major osteoporotic fracture <20% (12.3). FRAX score of hip fracture <3% (2.8). Do not feel she needs medication at this time. Do recommend continue vitamin D supplementation and maintenance of calcium intake. She is on calcium supplementation.  -Repeat DEXA  scan planned for 2026.      //Chronic leg swelling  //Venous insufficiency   PLAN:   BNP just slightly elevated. Weight is stable to baseline weight. Likely etiology is venous insufficiency. If progressive or gain more than 3lbs in 24 hour period, she is to take lasix 20mg. I have ordered 5 pills for her. She is aware if continues to gain or coupled with shortness of breath she needs to go to the ER due to her history of heart failure.     //R leg contraction knots  Patient reports small knots that she can feel in her muscles on her leg. Concerned if this is something she should be worried about. Can be uncomfortable when pressed on, but otherwise no pain.   PLAN:   Exam notable for presence only with palpation during contraction of the quadricep muscle. Notable above the knee about 1cm in size and additional one located laterally on the side of the thigh as well. Some tenderness noted with palpation. Coincides with flare of sciatica. CTM. If increase in size or more visible, would consider ultrasound of the area to evaluate further. Ddx less concerning for vein pathology, mass. Most recent vein ultrasound negative per review.     HEALTH MAINTENANCE:   -Vaccinations: Prevnar complete, Shingrix done, Flu done, COVID done  -Colonoscopy: 03/16/2018  -Mammogram: 10/15/2024  -Pap Smear: - Aged out  -DEXA: 12/7/2021  -Diabetes screening: Last A1c value was 6.1% done 11/14/2024.  -Lipid screening: Cholesterol: 179, done on 11/14/2024.  HDL Cholesterol: 56, done on 11/14/2024.  TriGlycerides 107, done on 11/14/2024.  LDL Cholesterol: 104, done on 11/14/2024.   -Birth Control: post menopausal   -Smoking: none  -Alcohol: rare   -Marijuana: none  -Recreational drug: none    Return in about 6 months (around 12/9/2025) for AWV.    Bailey Gibbs MD   Internal Medicine     Abridge tool was used for dictation purposes only and the patient was not recorded at any point during the visit.               [1]   Current Outpatient  Medications   Medication Sig Dispense Refill    fluticasone propionate 50 MCG/ACT Nasal Suspension 1 spray by Nasal route 2 (two) times daily. 48 g 3    furosemide 20 MG Oral Tab Take 1 tablet (20 mg total) by mouth daily as needed (If gain more than 3lbs in 24 hours.). 30 tablet 0    levothyroxine 75 MCG Oral Tab Take 1 tablet (75 mcg total) by mouth before breakfast. 90 tablet 3    eszopiclone 2 MG Oral Tab Take 1 tablet (2 mg total) by mouth nightly as needed. 30 tablet 0    sacubitril-valsartan 24-26 MG Oral Tab Take 1 tablet by mouth daily.      aspirin 81 MG Oral Tab EC Take 1 tablet (81 mg total) by mouth daily.      gabapentin 300 MG Oral Cap Take 1 capsule (300 mg total) by mouth in the morning, at noon, and at bedtime.      lidocaine 5 % External Patch Place 1 patch onto the skin daily.      riboflavin 100 MG Oral Tab Vitamin B-2  100 mg tablet, [RxNorm: 396321]      carvedilol 3.125 MG Oral Tab Take 1 tablet (3.125 mg total) by mouth 2 (two) times daily with meals.      Ginkgo Biloba (GINKOBA OR) Take 240 mg by mouth every morning.      Omega 3-6-9 Fatty Acids (OMEGA 3-6-9 COMPLEX) Oral Cap Take 1 capsule by mouth daily.      Tart Molina 1200 MG Oral Cap Take 1 capsule by mouth as needed.      Magnesium 500 MG Oral Tab Take 1 tablet (500 mg total) by mouth daily.      Cholecalciferol (VITAMIN D3) 5000 UNITS Oral Cap Take 1 capsule (5,000 Units total) by mouth daily.      Calcium Carb-Cholecalciferol 500-600 MG-UNIT Oral Tab Take 1 tablet by mouth daily.        Biotin 5000 MCG Oral Tab Take 1 tablet (5 mg total) by mouth daily.      Vitamin B-12 500 MCG Oral Tab Take 1 tablet (500 mcg total) by mouth daily.      Pyridoxine HCl (VITAMIN B-6) 100 MG Oral Tab Take 1 tablet (100 mg total) by mouth daily.      vitamin E 400 UNITS Oral Cap Take 1 capsule (400 Units total) by mouth daily.      Chromium-Cinnamon (CINNAMON PLUS CHROMIUM OR) Take 2,000 mg by mouth 2 (two) times a day.      Cranberry 500 MG Oral Cap  Take 1 capsule by mouth 2 (two) times daily.        Lutein-Zeaxanthin 25-5 MG Oral Cap Take 1 capsule by mouth daily.      Ferrous Sulfate 325 (65 FE) MG Oral Tab Take 0.2 tablets (65 mg total) by mouth daily.      Vitamin C (VITAMIN C) 500 MG Oral Tab Take 1 tablet (500 mg total) by mouth daily.     [2]   Past Medical History:   Acquired hypothyroidism    Arthritis    Seeing a Chiroprator for ankles knees and hips especially ri    Arthritis of right hip    Basal cell carcinoma (BCC) of right upper arm    Breast cancer (HCC)    right breast    Cancer (HCC)    Chronic ankle pain, bilateral    CKD (chronic kidney disease) stage 3, GFR 30-59 ml/min (HCC)    Colon polyp, hyperplastic    Colon polyp, hyperplastic    COPD (chronic obstructive pulmonary disease) (HCC)    Depression, recurrent    Essential hypertension    Flat foot    Heart disease    WEAKEND HEART    High blood pressure    History of adenomatous polyp of colon    History of squamous cell carcinoma    Left cheek, January 2018    HTN (hypertension)    Hypothyroidism    Obesity    Obstructive sleep apnea (adult) (pediatric)    AHI 15 supine AHI 22 non-supien AHI 8 SaO2 faith 68 %    Onychomycosis    JAMES on CPAP    Osteoarthritis    Periorbital swelling    nickel allergy    Personal history of antineoplastic chemotherapy    PONV (postoperative nausea and vomiting)    nausea    Prediabetes    Rosacea    Sleep apnea    NO CPAP USE AT THIS TIME    Spinal stenosis of lumbar region at multiple levels    Stage 3a chronic kidney disease (HCC)    Subclinical hypothyroidism    Swelling of both ankles    UTI (urinary tract infection)    Venous insufficiency    Visual impairment    glasses   [3]   Past Surgical History:  Procedure Laterality Date    Arthroscopy of joint unlisted      Biopsy of breast, needle core      x3  benign    Breast reconstruction Bilateral 2/23/15    with (FEMI)abdominal free flap    Colonoscopy  age 57 yrs    Colonoscopy      Colonoscopy N/A  3/16/2018    Procedure: COLONOSCOPY, POSSIBLE BIOPSY, POSSIBLE POLYPECTOMY 92830;  Surgeon: Sonja Cedillo MD;  Location: [facility]    D & c      Mastectomy left      Mastectomy right      Mri breast biopsy right Right 1/13/15    Needle biopsy left      Needle biopsy right            Other Bilateral 2/23/15    mastectomies      Other  2022    LEFT FRONTAL NEEDLE BIOPSY OF CALVARIAL LESION WITH NEURO NAVIGATIONLeftGeneral    Other surgical history  03/16/15    power port    Other surgical history  2015    Bilateral mastectomy    Other surgical history  2018    excision skin left cheek- actinic keratosis    Shoulder surg proc unlisted Left     removal of benign lump    Skin surgery      Tonsillectomy      Us breast biopsy Right 12/10/14   [4]   Family History  Problem Relation Age of Onset    Glaucoma Mother     Dementia Mother     Cancer Mother         Bladder Cancer    Hypertension Mother     Fuchs' dystrophy Mother     Other (htn) Mother     Other (osteoporosis) Mother     Diabetes Father     Other (heart issues) Paternal Grandfather     Diabetes Maternal Grandmother         \"runs on mothers side\"    Other (Other) Maternal Grandmother     Cancer Sister 64        Breast Cancer    Cancer Sister         Breast Cancer    Fuchs' dystrophy Sister     Cancer Daughter 46        Breast Cancer    No Known Problems Daughter    [5]   Social History  Socioeconomic History    Marital status:    Tobacco Use    Smoking status: Never    Smokeless tobacco: Never    Tobacco comments:     Updated 24   Vaping Use    Vaping status: Never Used   Substance and Sexual Activity    Alcohol use: Yes     Alcohol/week: 0.0 - 1.0 standard drinks of alcohol     Comment: Social 1-2 month Never been drunk    Drug use: No   Other Topics Concern    Caffeine Concern No    Stress Concern No    Weight Concern Yes     Comment: Noom    Special Diet Yes     Comment: Noom    Exercise Yes    Seat Belt Yes      Social Drivers of Health     Food Insecurity: Low Risk  (2/24/2025)    Received from [facility]    Food Insecurity     Have there been times that your food ran out, and you didn't have money to get more?: No     Are there times that you worry that this might happen?: No   Transportation Needs: Low Risk  (2/24/2025)    Received from [facility]    Transportation Needs     Do you have trouble getting transportation to medical appointments?: No   Housing Stability: Low Risk  (2/24/2025)    Received from [facility]    Housing Stability     Are you worried that your electric, gas, oil, or water might be shut off?: No     Are you concerned about having a safe and reliable place to live?: No

## 2025-06-10 ENCOUNTER — LAB ENCOUNTER (OUTPATIENT)
Dept: LAB | Age: 75
End: 2025-06-10
Attending: STUDENT IN AN ORGANIZED HEALTH CARE EDUCATION/TRAINING PROGRAM
Payer: MEDICARE

## 2025-06-10 DIAGNOSIS — I50.32 CHRONIC HEART FAILURE WITH PRESERVED EJECTION FRACTION (HCC): ICD-10-CM

## 2025-06-10 DIAGNOSIS — E03.9 ACQUIRED HYPOTHYROIDISM: ICD-10-CM

## 2025-06-10 DIAGNOSIS — N18.31 STAGE 3A CHRONIC KIDNEY DISEASE (HCC): ICD-10-CM

## 2025-06-10 LAB
ANION GAP SERPL CALC-SCNC: 11 MMOL/L (ref 0–18)
BUN BLD-MCNC: 23 MG/DL (ref 9–23)
CALCIUM BLD-MCNC: 9.3 MG/DL (ref 8.7–10.6)
CHLORIDE SERPL-SCNC: 106 MMOL/L (ref 98–112)
CO2 SERPL-SCNC: 25 MMOL/L (ref 21–32)
CREAT BLD-MCNC: 1 MG/DL (ref 0.55–1.02)
EGFRCR SERPLBLD CKD-EPI 2021: 60 ML/MIN/1.73M2 (ref 60–?)
FASTING STATUS PATIENT QL REPORTED: YES
GLUCOSE BLD-MCNC: 100 MG/DL (ref 70–99)
NT-PROBNP SERPL-MCNC: 169 PG/ML (ref ?–125)
OSMOLALITY SERPL CALC.SUM OF ELEC: 298 MOSM/KG (ref 275–295)
POTASSIUM SERPL-SCNC: 4.1 MMOL/L (ref 3.5–5.1)
SODIUM SERPL-SCNC: 142 MMOL/L (ref 136–145)
TSI SER-ACNC: 1 UIU/ML (ref 0.55–4.78)

## 2025-06-10 PROCEDURE — 36415 COLL VENOUS BLD VENIPUNCTURE: CPT

## 2025-06-10 PROCEDURE — 84443 ASSAY THYROID STIM HORMONE: CPT

## 2025-06-10 PROCEDURE — 80048 BASIC METABOLIC PNL TOTAL CA: CPT

## 2025-06-10 PROCEDURE — 83880 ASSAY OF NATRIURETIC PEPTIDE: CPT

## 2025-06-12 RX ORDER — FUROSEMIDE 20 MG/1
TABLET ORAL
Qty: 90 TABLET | Refills: 0 | OUTPATIENT
Start: 2025-06-12

## 2025-08-06 DIAGNOSIS — N18.31 STAGE 3A CHRONIC KIDNEY DISEASE (HCC): Primary | ICD-10-CM

## 2025-08-06 DIAGNOSIS — I50.32 CHRONIC HEART FAILURE WITH PRESERVED EJECTION FRACTION (HCC): ICD-10-CM

## 2025-08-07 RX ORDER — FUROSEMIDE 20 MG/1
TABLET ORAL
Qty: 90 TABLET | Refills: 0 | Status: SHIPPED | OUTPATIENT
Start: 2025-08-07

## (undated) DIAGNOSIS — M89.9 SKULL LESION: Primary | ICD-10-CM

## (undated) DIAGNOSIS — Z98.890 STATUS POST BIOPSY: Primary | ICD-10-CM

## (undated) DIAGNOSIS — F51.01 PRIMARY INSOMNIA: ICD-10-CM

## (undated) DIAGNOSIS — R51.9 WORSENING HEADACHES: ICD-10-CM

## (undated) DIAGNOSIS — M89.9 SKULL LESION: ICD-10-CM

## (undated) DEVICE — OIL CARTRIDGE: Brand: CORE, MAESTRO

## (undated) DEVICE — FLOSEAL HEMOSTATIC MATRIX, 5ML: Brand: FLOSEAL HEMOSTATIC MATRIX

## (undated) DEVICE — GAUZE SPONGES,12 PLY: Brand: CURITY

## (undated) DEVICE — 3.0MM PRECISION ROUND

## (undated) DEVICE — SOFTWARE SPHR MED

## (undated) DEVICE — STERILE POLYISOPRENE POWDER-FREE SURGICAL GLOVES: Brand: PROTEXIS

## (undated) DEVICE — SUTURE VICRYL 3-0 SH

## (undated) DEVICE — TRAJ GUIDE KIT, 9733065, BIOPSY, EXT

## (undated) DEVICE — MARKER SKIN PREP RESIST STRL

## (undated) DEVICE — LIGHT HANDLE

## (undated) DEVICE — PAD SACRAL SPAN AID

## (undated) DEVICE — STERILE SYNTHETIC POLYISOPRENE POWDER-FREE SURGICAL GLOVES WITH HYDROGEL COATING: Brand: PROTEXIS

## (undated) DEVICE — ALCOHOL 70% 4 OZ

## (undated) DEVICE — SUTURE VICRYL 2-0 CT-2

## (undated) DEVICE — SPECIMEN CONTAINER,POSITIVE SEAL INDICATOR, OR PACKAGED: Brand: PRECISION

## (undated) DEVICE — SCD SLEEVE KNEE HI BLEND

## (undated) DEVICE — TRAJ GUIDE KIT, 9733066, BIOPSY, INT

## (undated) DEVICE — DIFFUSER: Brand: CORE, MAESTRO

## (undated) DEVICE — Device: Brand: INTELLICART™

## (undated) DEVICE — SOL  .9 1000ML BTL

## (undated) DEVICE — CRANIOTOMY CDS: Brand: MEDLINE INDUSTRIES, INC.

## (undated) DEVICE — SUTURE VICRYL 0 CT-1

## (undated) DEVICE — RANEY SCALP CLIP STERILE: Brand: AESCULAP

## (undated) DEVICE — CODMAN® DISPOSABLE PERFORATOR 14MM: Brand: CODMAN®

## (undated) NOTE — Clinical Note
Thank you for allowing me to care for your patient! I have ordered her follow up ultrasound and copied you on results for next year. She can return to see me in 1 year. Because she no longer has breast tissue, mostly screening will be just physical exam. Thanks, Dayana Flynn

## (undated) NOTE — LETTER
Requirements for Pre-Operative Clearance Requests  **All Fields Must Be Completed**    12/3/2024    Dear Surgeon,    We have been notified that your patient Vianney Daniel  1950, is scheduled for surgery and that you would like us to clear him/her for this procedure.  In order to comply with governmental coding requirements and in order to bill for our services, the following is required for us to be able to see this patient for pre-operative clearance.     Pre-op appointment is scheduled on 2/10/25 with Dr Gibbs     Comorbid diagnosis for which clearance is requested:          Surgical Diagnosis:            Procedure:           Surgeon:            Date of surgery:          Length of Surgery:      __________________________________________  Type of anesthesia:     __________________________________________  Location of surgery:           Phone:          Fax:         Completed pre-operative clearance should be faxed to:    Attention:          Phone:         Fax:         Check One:    ¨ Pre-op testing ordered by surgeon  ¨ Pre-op testing to be ordered by pre-admission testing  ¨ No pre-op testing needed    When this form is complete, please fax back to [phone]

## (undated) NOTE — LETTER
Patient Name: Ban Haas  YOB: 1950          MRN :  [id number]  Date:  5/9/2023  Referring Physician:  Aaron Sanabria and specialist Dr Willis Najera has attended 14 visits in Physical Therapy. 0 cancels 0 no shows. Subjective: Pt reports overall doing much better. Able to do more activities, walk more, clean the house Got back Melchor tape today for the first time since November, eased into it, did about half speed. Pain down the L and/or R legs much better; R ankle can still feel vice . Reports knee arthritis pain with ortho follow up in about a month. [place] 6500 steps last week in the city, exhausted the next day. Able to do it but soreness last week 4-5/10. Pain:  Decreased to 2-3/10. Haven't needed the Tylenol this week. Will take the Tylenol if pain gets to ~ 5/10. Objective:   SLR  R (-)  L (+) for L buttock and L sided LBP. More centralized. No radiating N/T   Gait: quality much improved. (+) Trendelenburg    ROM: segmental hypomobility consistent with diffuse DDD           Relief with flexion based exercises. Extension, L sidebend (+) L LBP    Palpation: Overall much improved. (+) tenderness multiple muscle groups. Significant decreased tenderness L ITB    Strength:  Distal myotomes strong/equal                 Core/gluteal weakness bilat                 Glut med R 3-/5, L 3-/5     Wears external lumbar support as needed for pain relief    Assessment: Excellent follow through with HEP and pt has overall made steady progress. Severe radiating pain with inability to walk at eval. Radiating symptoms have nearly resolved; still with intermittent R ankle pain. Pt also continues to follow up for knee arthritis. Benefit noted from recent hip injection as well. We have gradually been able to progress an appropriate exercise program and pt is beginning to gradually increase her weight bearing tolerance.  Functionally pt is able to walk more; caution still given to avoid overdoing it. Benefit noted with new more supportive footwear. Goals:      (to be met in 10 visits)   (I) with HEP  --> met  Demonstrate proper body mechanics with functional squat and without c/o pain. -->partially met; depending on the level of weight bearing activity for the day. Able to tolerate activities in standing/walking for errands 30 mins without aggravation of LE pain --> met  Able to sleep uninterrupted from LE pain. --> progressing  Radicular symptoms abolished with all ADL's.--> improved. R ankle 2-3x/week. Plan: Will keep chart open over the next month with the possibility of completing the final 2 visits on current referral if indicated. Otherwise, expect continued progress with HEP and will plan d/c. Pt also has follow up with ortho next month for possible knee injections. [facility] Notice to Patient: Medical documents like this are made available to patients in the interest of transparency. However, be advised this is a medical document and it is intended as exyg-yr-lrcs communication between your medical providers. This medical document may contain abbreviations, assessments, medical data, and results or other terms that are unfamiliar. Medical documents are intended to carry relevant information, facts as evident, and the clinical opinion of the practitioner. As such, this medical document may be written in language that appears blunt or direct. You are encouraged to contact your medical provider and/or [facility] Patient Experience if you have any questions about this medical document.

## (undated) NOTE — LETTER
Patient Name: Gunner Kim  YOB: 1950          MRN number:  [id number]  Date:  10/31/2017  Referring Physician:  Bertha Frey    Discharge Summary  Initial Functional Outcome Score 45/100  Final Functional Outcome Score 62/100  Number limited, or restricted    Patient/Family/Caregiver was advised of these findings, precautions, and treatment options and has agreed to actively participate in planning and for this course of care.   Thank you for your referral. If you have any questions, pl

## (undated) NOTE — MR AVS SNAPSHOT
[address] [id number] [phone]               Thank you for choosing us for your health care visit with Geoffery Blizzard, MD.  We are glad to serve you and happy to provid Thyroid Problem     Injury     Foot Pain           Medical Issues Discussed Today     Acquired hypothyroidism    Vitamin D deficiency    Routine general medical examination at a health care facility        Plantar fasciitis          Instructions and Infor Commonly known as:  K-DUR M20           Vitamin B-12 500 MCG Tabs   Take 2,500 mcg by mouth daily. Commonly known as:  VITAMIN B12           vitamin B-6 100 MG Tabs   Take 100 mg by mouth daily.            Vitamin C 500 MG Tabs   Take 500 mg by mouth bryan active are less likely to develop some chronic diseases than adults who are inactive.      HOW TO GET STARTED: HOW TO STAY MOTIVATED:   Start activities slowly and build up over time Do what you like   Get your heart pumping – brisk walking, biking, swimmin

## (undated) NOTE — LETTER
Patient Name: Vianney Daniel  YOB: 1950          MRN number:  [id number]  Date:  6/4/2025  Referring Physician:  Jose Miguel Smith        Dear Dr. Smith,    Progress / Discharge Summary  Pt has attended 16 visits in Physical Therapy.  0 cancels 0 no shows    Subjective  The hip feels great! Pleased with progress.  Pleased was able to go down the stairs without the cane.  remaining difficulties - very difficult to get up off the floor. Improvement with walking, going to stores, holding cart helpful.  Primary c/o pain is the heel pain after plantar fasciitis irritation.   Able to stand at the counter ~ 20-30 mins max.       Pain: 0/10 (no c/o R hip pain; heel 5-6/10. sciatica 2/10)     Objective  amb without device; (+) L TTP plantar fascia insertion at calcaneus       Hip       4/17/2025 5/20/2025 6/4/2025   Hip ROM/MMT   Rt Hip Flexion 105 124 124   Rt Hip Flexion MMT (L2) 3+  4   Rt Hip Extension -5  -5   Rt Hip Extension MMT 3+  4   Rt Hip Abduction MMT 4  4   Lt Hip Abduction MMT 4-     Rt Hip ER 25 30 30   Rt Hip IR 20 25 25          Assessment  Excellent progress post R MARIA ESTHER.  Functionally WB activities most limited due to flare up of plantar fasciitis. Continued emphasis on not overdoing the walking,standing, stairs, houshold chores/yardwork for hours, as pt is very motivated and tends to overdo. Promoting continued emphasis on strengthening program/mat ex's to help support WB activity.   , Pt is ready to cont with independent HEP. Expect gradual progression.    Goals (to be met in 18 visits)   Progress made toward all goals.   (I) with HEP -met  Improved active hip flex to 110 deg for easier don/doff shoes, transfers.-met  Improved hip ABD strength to 4+/5 to increase ease with standing and walking. -progressing  SLS improved to 10 sec to promote safety and decreased risk of falls on uneven surfaces. Progressing. Limited to 1-3 sec.   Able to perform safe functional squat to reach floor  objects. -with UE support  Increase functional hip strength to enable reciprocal negotiation of flight stairs safely. -met. Cautioned to not overdo stairs       Plan  D/C to HEP    Post LEFS Score  Post LEFS Score: (Patient-Rptd) 75 % (6/3/2025  2:59 PM)    56.25 % improvement    Patient/Family/Caregiver was advised of these findings, precautions, and treatment options and has agreed to actively participate in planning and for this course of care.    Thank you for your referral. If you have any questions, please contact me at Dept: [phone].    Sincerely,  Electronically signed by therapist: Keiko Tejeda PT     Physician's certification required:  No      21st Century Cures Act Notice to Patient: Medical documents like this are made available to patients in the interest of transparency. However, be advised this is a medical document and it is intended as hsyi-xw-uihn communication between your medical providers. This medical document may contain abbreviations, assessments, medical data, and results or other terms that are unfamiliar. Medical documents are intended to carry relevant information, facts as evident, and the clinical opinion of the practitioner. As such, this medical document may be written in language that appears blunt or direct. You are encouraged to contact your medical provider and/or [facility] Patient Experience if you have any questions about this medical document.

## (undated) NOTE — LETTER
Patient Name: Nixon Gilliland  YOB: 1950          MRN :  [id number]  Date:  10/17/2023  Referring Physician:  Rosa Gee    Progress/Discharge Summary  Pt has attended 7 visits in Physical Therapy. Subjective:   Overall improvement and pt feels ready to continue with exercises on her own now. She will ease back into Melchor/gym. Good and bad days but she has been working on the strengthening exercises, core and hip strengthening. Will put off hip injection a month to plan it better around travel. Pleased she hasn't needed to use Tylenol for a couple weeks now. Leg cramps have also been better for the past month. Pain: across LB L/R R hip/thigh  4/10, at best 2/10, at worst: decreased to 5/10. Objective:   SLR R (-)   L (-)      (R (+) at eval)  Strength:  Glut med R 3+/5,  L 3/5  Glut max R 4/5  L 3+/5  Gradual decreased tenderness bilat ITB into gluteals - improved since last month  ROM: %. Ext (+) R LE pain thigh. R SB (+) R LE pain thigh      Assessment: Good overall, gradual progress in PT. Sciatica pain nearly resolved; pain more centralized across LB now. Pt has incorporated pacing principles into her day to day tasks. Discussed long term goals of activity, incorporating paced return to classes, consider Yoga, breathing/meditation principles. Functionally pt remains limited with any prolonged standing/walking tasks and increased activity around the house for chores. She has excellent follow through with her HEP and is now ready to cont with the exercises independently. Goals: Progress made toward all goals. (to be met in 6 visits)   (I) with HEP -> met  Demonstrate proper body mechanics with functional squat, able to reach objects on floor without c/o pain. Improved lumbar flexion to enable don/doff socks and shoes without complaints.    Able to tolerate activities in standing for dishes at sink, 20 mins without aggravation of pain  Able to sleep uninterrupted from pain. Plan:  D/C to HEP. Hip injection next month. Date: 6/22/2023  TX#: 2/6 Date:    9/6/23             TX#: 3/7 Date:  9/12/23               TX#: 4/7 Date:  9/26/23               TX#: 5/ Date: 10/3/23  Tx#: 6/7 10/17/23  7/7   TE  Nustep 5'   TE  reassess NR TE  Nustep 5' TE  Nustep 5' NR  Nustep 6'   gastroc stretch 5x  Pelvic tilt 10x  Sciatic nerve glides ~60\" each HEP review answering pt questions  S/L glut med partial range, progress from clam- work up to 20x STS 10x hip hinge  Sciatic nerve glides 10x2 each  Sidelying hip abd review  Pt ed back care, pain science, NS sensitization, NS requirements, ex benefits, yellow flags DLHR 10x2  HEP review, answering pt questions - glut med sidelying 20x  Cross leg lumbar stretch 3x each  NR  TNE as above  STS - hip hinge 10x2  Floor transfer train  -1/2 knee with support  -modified split squat 5x  Pelvic tilt 10x  TrAb control with bridge 15x DLHR 10x2  Standing hip abd [id number] each  STS review  HEP: cont with breathing/meditation, consider Yoga. Cardio component, desensitization principles  Manual   STM ITB into gluteals/ desensitization principles  Lumbar rot mobe L/R Gr II 30\"x3 each     Reassess and review of HEP for d/c, pt ed review with NS requirements, sensitization, benefits of appropriate execise, pacing principles.   Sciatic nerve glides L/R 20x  Bridge 10x--> modified: bridge with SLR progression 10x  Easier floor transfer noted  STS without UE assist/hip hinge  TrAb control hooklying Sahrmann L 1   LTR  10x  Pt ed pain science, NS sensitization, breathing, pacing principles, stress response Bridge 10x  TrAb review with dead bug Manual  STM ITB into gluteals  Lumbar rot mobe Gr II 30\"x3      Manual  STM L ITB/gluteals  L lumbar rot mobe Gr I, II 30\" x4  L/R LE distraction 10\"x3 each     Manual  STM L/R ITB/gluteals, roller  R lumbar rot mobe Gr I, II 30\" x4       HEP:  SKTC, DKTC, cross leg lumbar rot stretch L/R.  sciatic nerve memo. 9/6/23 Clam --> side glut med. Bridge progression  9/27 STS 20x  10/17 modified bridge with SLR. Charges: NRx3   Total Timed Treatment: 43 min  Total Treatment Time: 43 min              21st Century Cures Act Notice to Patient: Medical documents like this are made available to patients in the interest of transparency. However, be advised this is a medical document and it is intended as uhrq-gg-oguo communication between your medical providers. This medical document may contain abbreviations, assessments, medical data, and results or other terms that are unfamiliar. Medical documents are intended to carry relevant information, facts as evident, and the clinical opinion of the practitioner. As such, this medical document may be written in language that appears blunt or direct. You are encouraged to contact your medical provider and/or [facility] Patient Experience if you have any questions about this medical document.

## (undated) NOTE — Clinical Note
I had the pleasure of seeing Ronald Alberto on 1/27/2021. Please see my attached note.     Parker Jean Baptiste MD FACS  [facility]

## (undated) NOTE — Clinical Note
I had the pleasure of seeing Karly Pedersen on 4/4/2018. Please see my attached note.   Ronit Peterson MD FACS [facility]

## (undated) NOTE — LETTER
Patient Name: Vianney Daniel  YOB: 1950          MRN :  [id number]  Date:  4/18/2025  Referring Physician:  Jose Miguel Smith    Progress Summary  Pt has attended 10 visits in Physical Therapy.   0 cancels      Subjective  Continues to work on the exercises every day.   Pain has decreased to 3/10.   Sleeping remians very limited, just started Lunesta. Getting the leg up into car/bed without issue.   Stairs are still difficult.       Pain: 3/10     Objective  Amb without device short community distances. significantly improved qulaity vs preop.     SLS 1-2 sec       Hip       3/13/2025 4/10/2025 4/17/2025   Hip ROM/MMT   Rt Hip Flexion 90       Gentle hip ROM assessment due to post op status 100 105   Lt Hip Flexion 110     Rt Hip Flexion MMT (L2) 2+       gross assessment due to post op status; will cont to assess  3+   Rt Hip Extension   -5   Rt Hip Extension MMT   3+   Rt Hip Abduction MMT 2+  4   Lt Hip Abduction MMT   4-   Rt Hip ER 5 25 25   Lt Hip ER 40     Rt Hip IR 10 10 20   Lt Hip IR 15     , Knee       4/17/2025   Knee ROM/MMT   Rt Knee Flexion 4+   Lt Knee Flexion 4+   Rt Knee Extension (L3) 5 *intermittent knee pain   Lt Knee Extension (L3) 5            Assessment  Pt continues to making steady progress post MARIA ESTHER. Long hx of ipsilateral sciatica with flare up after surgery; this has gradually been subsiding.   Pt has progressed to ambulation without the cane short community distances, with fatigue. At times pt will wisely use the cane for safety outside the house.  Functionally stairs are limited; we continue to work on this.  This week pt has progressed to SLR without assist for a few reps. LE remains weak; she will benefit from additional PT.    Goals (to be met in 18 visits)   Progress made toward all goals.   (I) with HEP -ongoing  Improved active hip flex to 110 deg for easier don/doff shoes, transfers.-progressing  Improved hip ABD strength to 4+/5 to increase ease with standing and  walking. -progressing  SLS improved to 10 sec to promote safety and decreased risk of falls on uneven surfaces. progressing  Able to perform safe functional squat to reach floor objects. -with UE support  Increase functional hip strength to enable reciprocal negotiation of flight stairs safely. -not yet met, progressing       Plan  MD follow up next week. Pt has completed initial 10/10 visits. Recommend an additional 6 visits to cont to progress exercises regimen, strengthening gait/balance and stair training.      Post LEFS Score  Post LEFS Score: (Patient-Rptd) 57.5 % (4/17/2025  9:39 PM)    38.75 % improvement         Patient/Family/Caregiver was advised of these findings, precautions, and treatment options and has agreed to actively participate in planning and for this course of care.    Thank you for your referral. If you have any questions, please contact me at Dept: [phone].    Sincerely,  Electronically signed by therapist: Keiko Tejeda PT     Physician's certification required:  Yes  Please co-sign or sign and return this letter via fax as soon as possible to [phone].   I certify the need for these services furnished under this plan of treatment and while under my care.    X___________________________________________________ Date____________________    Certification From: 4/17/2025  To:7/16/2025              21st Century Cures Act Notice to Patient: Medical documents like this are made available to patients in the interest of transparency. However, be advised this is a medical document and it is intended as [id number] communication between your medical providers. This medical document may contain abbreviations, assessments, medical data, and results or other terms that are unfamiliar. Medical documents are intended to carry relevant information, facts as evident, and the clinical opinion of the practitioner. As such, this medical document may be written in language that appears blunt or  direct. You are encouraged to contact your medical provider and/or [facility] Patient Experience if you have any questions about this medical document.

## (undated) NOTE — LETTER
[facility]  A. Notifier:    DAMON Blank  [id number]    Advance Beneficiary Notice of Noncoverage (ABN)    Note:  If Medicare doesn't pay for D. PREVNAR 13   below, you may have to pay.   Medicare does not pay for everything, even marcial Option 3:  I don't want the D.    listed above. I understand with this choice I am not responsible for payment, and I cannot appeal to see if Medicare would pay. H.  Additional Information:     This notice gives our opinion, not an official Medicare de

## (undated) NOTE — LETTER
Patient Name: Derian Diaz  YOB: 1950          MRN number:  [id number]  Date:  11/26/2018  Referring Physician:  Joselo Aponte    Discharge Summary  Initial Functional Outcome Score 38/100  Final Functional Outcome Score 45/100  Nu Able to perform functional squat to reach floor objects. --> met, pain depending on the day  Increase functional hip strength to enable reciprocal negotiation of flight stairs safely --> typically met    Plan: D/C to HEP.   Pt will be out of town in the

## (undated) NOTE — LETTER
Patient Name: Vianney Daniel  YOB: 1950          MRN number:  [id number]  Date:  3/14/2025  Referring Physician:  Jose Miguel Smith         LOWER EXTREMITY EVALUATION:     Diagnosis:   History of total right Anterior hip replacement (Z96.641) Patient:  Vianney Daniel (74 year old, female)        Referring Provider: Jose Miguel Smith  Today's Date   3/14/2025    Precautions:  -- (per protocol; WBAT; AD 3 weeks; avoid repetative SL FWB x 6 weeks; No ext x4 wks)   Date of Evaluation: 03/13/25  Next MD visit: 4/22/25.  Date of Surgery: 2/24/25     PATIENT SUMMARY   Summary of chief complaints: R hip pain and immobility post MARIA ESTHER  History of current condition: long hx of OA hip pain R LE pain, hx of spinal stenosis, sciatica   Pain level: current 5 /10, at best 4 /10, at worst 7 /10  Description of symptoms: overall improvement post op.  primary c/o pain down R LE, aggravation of nerve pain R LE to foot.   Occupation: retired   Leisure activities/Hobbies: active/exercise classes   Prior level of function: independent, active, exercises classes/[place]  Current limitations: antaligic gait, walking/standig tasks, use of assistive device, limited sleep, lifting LE for transfers, ADL's, shoes/socks assistance, 2 stairs in house  Pt goals: independence and full recovery after hip surgery  Past medical history was reviewed with Vianney.  Significant findings include:    Imaging/Tests:     Vianney swelling both ankles, HTN controlled with meds, HLD, hypothyroidism, CKD stage 3a, JAMES, hx of breast cancer s/p chemo in remission     ASSESSMENT  Vianney presents to physical therapy evaluation with primary c/o R hip pain and immobility post MARIA ESTHER. The results of the objective tests and measures show decreased mobility, ROM/strength consistent with the procedure.  Pt with long hx of pain, OA, stenosis and sciatica therefore will benefit from appropriate exercise modifications and progress may be slower, but great start to  outpt PT.. Functional deficits include but are not limited to antaligic gait, walking/standig tasks, use of assistive device, limited sleep, lifting LE for transfers, ADL's, shoes/socks assistance, 2 stairs in house. 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 Pt and PT discussed evaluation findings, pathology, POC and HEP.  Pt voiced understanding and performs HEP correctly without reported pain. Skilled Physical Therapy is medically necessary to address the above impairments and reach functional goals.    OBJECTIVE:    Musculoskeletal  Observation: dry incision  Palpation: Hypersensitivity throughout ITB/thigh.   (-) calf   Accessory Motion: N/A     Edema: Min noted consistent with procedure.  Hx of compression sock wear.    Special Tests:N/A     ROM and Strength: (* denotes performed with pain)  Hip   ROM MMT (-/5)    R L R L     Flex (L2) 90 (Gentle hip ROM assessment due to post op status) 110 2+ (gross assessment due to post op status; will cont to assess)       Ext              Abd     2+       ER 5 40         IR 10 15            Flexibility:  LE Flexibility R L     Hip Flexor         Hamstrings min restricted min restricted     ITB         Piriformis         Quads         Gastroc-soleus         Neurological:  Sensation: LE grossly intact to light touch; no N/T     Balance and Functional Mobility:  Gait: pt ambulates on level ground with assistive device; decreased step length; decreased stance phase   Balance: SLS: R 0 sec (N/A),  L 0 sec (N/A)       Today's Treatment and Response:   Pt education was provided on exam findings, treatment diagnosis, treatment plan, expectations, and prognosis.  Today's Treatment       3/13/2025   LE Treatment   Therapeutic Exercise Pt ed; reviewed precautions as noted above, cont gait with device for now, safety awareness/fall risk, pacing principles, gentle ROM  Home PT ex review  Modified prior LB ex's due to  hip precautions.   Pelvic tilt, standing LB stretch with support for LB hx/symptom management  TKE 10x  Low level BKFO 20x  Heel slide 10x   Therapeutic Exercise Minutes 15   Evaluation Minutes 30   Total Time Of Timed Procedures 15   Total Time Of Service-Based Procedures 30   Total Treatment Time 45   HEP Cont Home PT program  Quad/glut sets  Heel slide  Ankle pumps  Hooklying pelvic tilt  Low level BKFO  Standing with UE support DLHR, low level march  Supported wt shifts        Patient was instructed in and issued a HEP for: Cont Home PT program  Quad/glut sets  Heel slide  Ankle pumps  Hooklying pelvic tilt  Low level BKFO  Standing with UE support DLHR, low level march  Supported wt shifts    Charges:  PT EVAL: Moderate Complexity, TE  In agreement with evaluation findings and clinical rationale, this evaluation involved MODERATE COMPLEXITY decision making due to 1-2 personal factors/comorbidities, 3 or more body structures involved/activity limitations, and evolving symptoms as documented in the evaluation.                                                                                                                PLAN OF CARE:    Goals: (to be met in 18 visits)   (I) with HEP  Improved active hip flex to 110 deg for easier don/doff shoes, transfers.  Improved hip ABD strength to 4+/5 to increase ease with standing and walking.   SLS improved to 10 sec to promote safety and decreased risk of falls on uneven surfaces.  Able to perform safe functional squat to reach floor objects.   Increase functional hip strength to enable reciprocal negotiation of flight stairs safely.       Frequency / Duration: Patient will be seen 1-2x/weekx/week or a total of 10 (10 POC)  visits over a 90 day period. Treatment will include: Gait training; Manual Therapy; Neuromuscular Re-education; Therapeutic Exercise; Therapeutic Activities; Home Exercise Program instruction    Education or treatment limitation: None   Rehab  Potential: good     LEFS Score  LEFS Score: (Patient-Rptd) 18.75 % (3/12/2025  2:23 PM)      Patient/Family/Caregiver was advised of these findings, precautions, and treatment options and has agreed to actively participate in planning and for this course of care.    Thank you for your referral. Please co-sign or sign and return this letter via fax as soon as possible to [phone]. If you have any questions, please contact me at Dept: [phone]    Sincerely,  Electronically signed by therapist: Keiko Tejeda PT  Physician's certification required: Yes  I certify the need for these services furnished under this plan of treatment and while under my care.    X___________________________________________________ Date____________________    Certification From: 3/14/2025  To:6/12/2025 21st Century Cures Act Notice to Patient: Medical documents like this are made available to patients in the interest of transparency. However, be advised this is a medical document and it is intended as ukim-sd-jawl communication between your medical providers. This medical document may contain abbreviations, assessments, medical data, and results or other terms that are unfamiliar. Medical documents are intended to carry relevant information, facts as evident, and the clinical opinion of the practitioner. As such, this medical document may be written in language that appears blunt or direct. You are encouraged to contact your medical provider and/or [facility] Patient Experience if you have any questions about this medical document.

## (undated) NOTE — Clinical Note
I had the pleasure of seeing Savana Doing on 9/20/2017. Please see my attached note.   Dwight Paige MD FACS [facility]--Surgery

## (undated) NOTE — MR AVS SNAPSHOT
After Visit Summary   5/1/2017    Lamon Sicard    MRN: [id number]           Diagnoses this Visit     Medication adverse effect, subsequent encounter    -  Primary     Malignant neoplasm of central portion of right female breast ([facility] [address]         Ashley Ginkgo Biloba (GINKOBA OR) Take 120 mg by mouth 2 (two) times daily. Lutein-Zeaxanthin 25-5 MG Oral Cap Take 1 capsule by mouth daily. Ferrous Sulfate 325 (65 FE) MG Oral Tab Take 65 mg by mouth daily.       Vitamin C (VITAMIN C) 500 MG Oral Tab Take Please view results for these tests on the individual orders.          Result Summary for COMP METABOLIC PANEL (14)      Component Results     Component Value Flag Ref Range Units Status    Glucose 92  70-99  mg/dL Final    BUN 31 (H) 8-20  mg/dL Final    C Basophil Absolute 0.03  0.00-0.10  x10(3) uL Final    Immature Granulocyte Absolute 0.03  0.00-1.00  x10(3) uL Final    Neutrophil % 66.6   % Final    Lymphocyte % 23.6   % Final    Monocyte % 6.5   % Final    Eosinophil % 2.3   % Final    Basophil % 0.5

## (undated) NOTE — LETTER
Patient Name: Vianney Daniel  YOB: 1950          MRN :  [id number]  Date:  9/18/2024  Referring Physician:  Bailey Gibbs    Progress Summary  Pt has attended 10 visits in Physical Therapy.   0 cancels    Subjective: The whole R leg is bothersome, from the ankle all the way up the leg, hip and sciatic.  All the walking, standing, cooking, shopping, stairs, sitting [place]  Began using the cane.   Difficulty lifting the leg into the car.     Pain:   5-7/10 R LE R LB/hip down leg  Foot tingling at times      Objective:   R hip restriction: ER 15 deg, IR 10 deg  +pain  (+) Garrick's R  (+) R hip scour      ROM: full forward bend without pain.                 Ext 50% with LBP (-) radiating symptoms.  Relief with flexion based stretches; hx of stenosis.   (+) ITB/(+) phillip  Strength: pain limiting MMT; will cont to assess.   Glut med 2+/5 with pain R  Lower abs 2/5  Distal myotomes strong/equal    Gait: (+) trendelenburg R, antalgic.  Began using cane with relief.  (-) SLR  (+) tenderness multiple muscle groups, hip rotators, ITB throughout including insertion at patella    Assessment: [place] after increased stairs/walking and activity, with increased pain throughout R LE and decreased R hip mobility.   R hip area/ITB sensitive and tender to palpation throughout.   Began using cane and with relief.    Pt continues to have excellent follow through with HEP.      7/16/24 Progress Note: R hip/ITB pain continues to limit functional mobility. Exquisite tenderness multiple muscles groups throughout hip and ITB.  Bursa injection was Friday with relief noted. Pt has limited tolerance for icing. Light STM is tolerated. Excellent follow through with HEP.   Pt tends to overdo but is working through better pacing principles and adjustments.  Pt is also incorporating sleep hygiene and nervous system desensitization principles.  Sleep has improved.  Functionally, stairs  remain difficult; often  leading with the L only.  Pt will benefit from progression to more functional strengthening as pain is better managed.     Goals:   (to be met in 10 visits)   (I) with HEP - ongoing   Demonstrate proper body mechanics with functional squat and without c/o pain. Partially met due to pain  Improved lumbar flexion to enable don/doff socks and shoes without complaints. Met, full flexion  Able to tolerate activities in stand/walk 60 mins with decreased pain to 4/10 levels max. Not consistently met  Able to sleep uninterrupted from pain.  Not consistently met  Radicular symptoms at least 80% improvement with all ADL's. - in progress    Plan:  PT has completed 10/10 initial visits.  Cont HEP independently as pt follows up with ortho in Oct for worsening hip pain.      Patient/Family/Caregiver was advised of these findings, precautions, and treatment options and has agreed to actively participate in planning and for this course of care.    Thank you for your referral. If you have any questions, please contact me at Dept: [phone].    Sincerely,  Electronically signed by therapist: Keiko Tejeda, PT     Physician's certification required:  no            21st Century Cures Act Notice to Patient: Medical documents like this are made available to patients in the interest of transparency. However, be advised this is a medical document and it is intended as eqbh-fq-ffst communication between your medical providers. This medical document may contain abbreviations, assessments, medical data, and results or other terms that are unfamiliar. Medical documents are intended to carry relevant information, facts as evident, and the clinical opinion of the practitioner. As such, this medical document may be written in language that appears blunt or direct. You are encouraged to contact your medical provider and/or [facility] Patient Experience if you have any questions about this medical document.

## (undated) NOTE — LETTER
Date: 3/17/2022    Patient Name: Jayda Daniel          To Whom it may concern: This letter has been written at the patient's request. The above patient was seen at the [facility] for treatment of a medical condition. The patient may return to the gym without restrictions from a neurosurgical stand point. Please call with any questions/concerns, thank you. [phone], option 1.          Sincerely,    ROBERTO Grossman Dr.

## (undated) NOTE — LETTER
02/01/22        Nik Daniel  [address]      Dear [facility],    [address] records indicate that you have outstanding lab work and or testing that was ordered for you and has not yet been completed:  Orders Placed This Encounter

## (undated) NOTE — LETTER
22  RE: Charles Restrepo     : 1950    Dear Dr. Rosario Quezada,    This letter is to inform you that your patient is being scheduled for surgery with Dr. Carlos Kumar on 2/15/22 at [facility]. We have asked the patient to contact your office to schedule a pre-operative exam/visit. Diagnosis: Skull Lesion  Procedure: Left frontal needle biopsy of calvarial lesion     A Pre-operative History & Physical is needed for medical clearance. Please address patient's active problems (i.e high blood pressure, breathing issues etc.)  Pre-op labs are scheduled through the [facility] If any labs/testing are being done through the PCP office, then results should be faxed to the pre-admission testing department at [facility] [phone]. Our pre-operative lab orders are located in our surgery order, if the patient would like these done through your office, you will need to place separate orders. Please fax clearance letter/office visit note to the Pre-Admission department or our office at fax #: [phone]. Your office note must clearly indicate if the patient is medically cleared for surgery or not. The following orders will be placed by pre-admission testing:  EKG  CXR  CBC  Comprehensive Metabolic Panel  Type and Screen (if applicable)  PT/PTT/INR  MRSA/MSSA Nasal Swab  Covid-19 testing  (*And any other pertinent testing based on patient's current clinical condition.)    If you have any questions, you may contact our office at [phone], option # 3.     Thank you,     Clinical Staff Nurse  Darcy

## (undated) NOTE — Clinical Note
I had the pleasure of seeing Corine Barnard on 12/11/2019. Please see my attached note. Karlene Wall MD [facility]--Surgery

## (undated) NOTE — Clinical Note
Thank you for allowing me to care for your patient! I have ordered her next ultrasound in 1 year and will see her in 1 year. Thanks, Dayana Flynn

## (undated) NOTE — Clinical Note
I had the pleasure of seeing Nu Moreno on 10/10/2018. Please see my attached note. Georgie Gregory MD [facility]--Surgery